# Patient Record
Sex: FEMALE | Race: WHITE | NOT HISPANIC OR LATINO | Employment: FULL TIME | URBAN - METROPOLITAN AREA
[De-identification: names, ages, dates, MRNs, and addresses within clinical notes are randomized per-mention and may not be internally consistent; named-entity substitution may affect disease eponyms.]

---

## 2018-06-27 RX ORDER — HYDROCODONE BITARTRATE AND ACETAMINOPHEN 10; 325 MG/1; MG/1
1 TABLET ORAL EVERY 6 HOURS PRN
Refills: 0 | COMMUNITY
Start: 2018-05-10 | End: 2018-07-03

## 2018-06-27 RX ORDER — METHIMAZOLE 5 MG/1
5 TABLET ORAL DAILY
Refills: 6 | COMMUNITY
Start: 2018-05-27 | End: 2018-07-03 | Stop reason: SDUPTHER

## 2018-06-27 RX ORDER — ZOLPIDEM TARTRATE 10 MG/1
10 TABLET ORAL EVERY EVENING
Refills: 1 | COMMUNITY
Start: 2018-05-30 | End: 2018-11-01 | Stop reason: SDUPTHER

## 2018-06-28 RX ORDER — FLUCONAZOLE 150 MG/1
150 TABLET ORAL AS NEEDED
COMMUNITY
Start: 2018-04-02 | End: 2019-11-04

## 2018-06-28 RX ORDER — ALBUTEROL SULFATE 90 UG/1
2 AEROSOL, METERED RESPIRATORY (INHALATION)
COMMUNITY
Start: 2018-04-02 | End: 2018-07-03

## 2018-07-03 ENCOUNTER — LAB (OUTPATIENT)
Dept: LAB | Facility: CLINIC | Age: 38
End: 2018-07-03
Payer: COMMERCIAL

## 2018-07-03 ENCOUNTER — TRANSCRIBE ORDERS (OUTPATIENT)
Dept: ADMINISTRATIVE | Facility: HOSPITAL | Age: 38
End: 2018-07-03

## 2018-07-03 ENCOUNTER — OFFICE VISIT (OUTPATIENT)
Dept: FAMILY MEDICINE CLINIC | Facility: CLINIC | Age: 38
End: 2018-07-03
Payer: COMMERCIAL

## 2018-07-03 VITALS
WEIGHT: 176 LBS | OXYGEN SATURATION: 98 % | HEART RATE: 91 BPM | SYSTOLIC BLOOD PRESSURE: 112 MMHG | BODY MASS INDEX: 25.2 KG/M2 | DIASTOLIC BLOOD PRESSURE: 64 MMHG | HEIGHT: 70 IN

## 2018-07-03 DIAGNOSIS — E05.00 GRAVES' DISEASE: ICD-10-CM

## 2018-07-03 DIAGNOSIS — Z11.4 SCREENING FOR HIV (HUMAN IMMUNODEFICIENCY VIRUS): ICD-10-CM

## 2018-07-03 DIAGNOSIS — R25.2 MUSCLE CRAMP: ICD-10-CM

## 2018-07-03 DIAGNOSIS — M79.672 FOOT PAIN, LEFT: ICD-10-CM

## 2018-07-03 DIAGNOSIS — N64.3 GALACTORRHEA: ICD-10-CM

## 2018-07-03 DIAGNOSIS — D50.0 IRON DEFICIENCY ANEMIA DUE TO CHRONIC BLOOD LOSS: Primary | ICD-10-CM

## 2018-07-03 DIAGNOSIS — G47.09 OTHER INSOMNIA: ICD-10-CM

## 2018-07-03 PROBLEM — R68.82 DECREASED LIBIDO: Status: ACTIVE | Noted: 2018-07-03

## 2018-07-03 LAB
ANION GAP SERPL CALCULATED.3IONS-SCNC: 4 MMOL/L (ref 4–13)
BUN SERPL-MCNC: 11 MG/DL (ref 5–25)
CALCIUM SERPL-MCNC: 8.9 MG/DL (ref 8.3–10.1)
CHLORIDE SERPL-SCNC: 108 MMOL/L (ref 100–108)
CO2 SERPL-SCNC: 25 MMOL/L (ref 21–32)
CREAT SERPL-MCNC: 0.84 MG/DL (ref 0.6–1.3)
GFR SERPL CREATININE-BSD FRML MDRD: 88 ML/MIN/1.73SQ M
GLUCOSE SERPL-MCNC: 83 MG/DL (ref 65–140)
MAGNESIUM SERPL-MCNC: 2.4 MG/DL (ref 1.6–2.6)
POTASSIUM SERPL-SCNC: 4.7 MMOL/L (ref 3.5–5.3)
PROLACTIN SERPL-MCNC: 8.4 NG/ML
SODIUM SERPL-SCNC: 137 MMOL/L (ref 136–145)
TSH SERPL DL<=0.05 MIU/L-ACNC: 0.72 UIU/ML (ref 0.36–3.74)

## 2018-07-03 PROCEDURE — 80048 BASIC METABOLIC PNL TOTAL CA: CPT

## 2018-07-03 PROCEDURE — 83735 ASSAY OF MAGNESIUM: CPT

## 2018-07-03 PROCEDURE — 99204 OFFICE O/P NEW MOD 45 MIN: CPT | Performed by: INTERNAL MEDICINE

## 2018-07-03 PROCEDURE — 1036F TOBACCO NON-USER: CPT | Performed by: INTERNAL MEDICINE

## 2018-07-03 PROCEDURE — 87389 HIV-1 AG W/HIV-1&-2 AB AG IA: CPT

## 2018-07-03 PROCEDURE — 84146 ASSAY OF PROLACTIN: CPT

## 2018-07-03 PROCEDURE — 36415 COLL VENOUS BLD VENIPUNCTURE: CPT

## 2018-07-03 PROCEDURE — 84443 ASSAY THYROID STIM HORMONE: CPT

## 2018-07-03 RX ORDER — METHIMAZOLE 5 MG/1
5 TABLET ORAL DAILY
Qty: 90 TABLET | Refills: 1 | Status: SHIPPED | OUTPATIENT
Start: 2018-07-03 | End: 2019-01-21 | Stop reason: SDUPTHER

## 2018-07-03 NOTE — ASSESSMENT & PLAN NOTE
I think many of her symptoms are likely related to hyperthyroidism  Since she has been on the methimazole 5 mg daily for about a month, will recheck TSH and free T4  We did discuss that she may need to consider I 131 ablation or thyroidectomy in the long run

## 2018-07-03 NOTE — PROGRESS NOTES
Assessment/Plan:    Graves' disease    I think many of her symptoms are likely related to hyperthyroidism  Since she has been on the methimazole 5 mg daily for about a month, will recheck TSH and free T4  We did discuss that she may need to consider I 131 ablation or thyroidectomy in the long run  Kidney stones    Although she has not had a stone workup, she has been able to avoid kidney stones staying hydrated  We also talked about the possibility of decreasing protein intake which we will not pursue she has more kidney stones  Iron deficiency anemia due to chronic blood loss    This is likely related to menstrual losses  Will start on iron sulfate 324 mg every other day and we will likely repeat a CBC and ferritin in about 6 weeks  Other insomnia    This is likely related to stressors  It has been ongoing for about 10 years  Since she has been on an Ambien for quite some time, I think that the likelihood that she will be able to sleep without this medication is remote  We did discuss the possibility of taking nortriptyline but she had been on this in the past for the left foot pain  For now, we will continue Ambien  Foot pain, left    I wary of using hydrocodone and other opiates as is she   Given that she has already failed try cyclic antidepressants and gabapentin, I am not confident that adding more medication at this time would be very helpful  In any event, she is wary of taking more pills  Muscle cramp    Seems she is having cramping in her left foot in addition to the chronic pain  Will check a basic metabolic panel  and magnesium levels  Galactorrhea   She did have a pituitary MRI in the past which did not reveal a pituitary microadenoma  Will check prolactin level  Decreased libido  Could be related to elevated prolactin  There are some stressors that may also be a contributor  Will check prolactin      Orders Placed This Encounter   Procedures    Rapid HIV 1/2 AB-AG Combo     Standing Status:   Future     Number of Occurrences:   1     Standing Expiration Date:   7/3/2019    Prolactin     Standing Status:   Future     Number of Occurrences:   1     Standing Expiration Date:   7/3/2019    TSH, 3rd generation with Free T4 reflex     Standing Status:   Future     Number of Occurrences:   1     Standing Expiration Date:   7/3/2019    Basic metabolic panel     This is a patient instruction: Patient fasting for 8 hours or longer recommended  Standing Status:   Future     Number of Occurrences:   1     Standing Expiration Date:   7/3/2019    Magnesium     Standing Status:   Future     Number of Occurrences:   1     Standing Expiration Date:   7/3/2019        Chief complaint: I am tired and I do not feel well  HPI:    Kartik Alejandre is a 44 y o  female She feels tired, doesn't sleep well, has diarrhea, loss of libido  She was diagnosed with Graves disease on the basis of a suppressed TSH and thyroid stimulating immunoglobulins  She was started on actually but she did tolerate this well  She was switched to methimazole 2 5 mg daily  A TSH on the 5th of June was still suppressed and her but that is all was increased to 5 mg daily  She reports that she has discussed the possibility of I 131 ablation and thyroidectomy with her endocrinologist in [place]  Has iron deficiency anemia with a ferritin of 10  She doesn't chew ice but likes her drinks very cold and likes salty foods  Isn't on any iron supplements currently  She doesn't have heavy menstrual periods; first two days are heavy, the remaining 3 days are light  She has chronic pain in her left foot related to an injury several years ago  Treatment of the injury actually required fracturing and fixing the fracture  Since then, she has had persistent pain  She has tried nortriptyline and gabapentin which were not effective    She had taken hydrocodone in the past but she did not feel comfortable taking his medication because of potential for addiction  Past Medical History:   Diagnosis Date    Anemia     Disease of thyroid gland     hyperthyroid    Foot pain, left 2013    She fractured a bone in her left foot  Had severe pain associated with redness  Was given the diagnosis of reflex sympathetic dystrophy  Has had nerve blocks which were ineffective   Galactorrhea in female 11/10/2015    Genital warts     Graves' disease 7/3/2018    TSI positive  Intolerant of TPU  Switched to methimazole 5 mg daily by endocrinologist   TSH in June of 2018 0 005 with normal T3 & T4  Followed by endocrinology at [facility] Katelyn Estrada   Hepatitis B 1998    Diagnosed with acute Hepatitis B  Subsequent Hep B Surface Ag negative, Surface Ab positive  HCV negative, HIV negative 2010   Hypertrophic scar 07/12/2012    Iron deficiency anemia due to chronic blood loss 7/3/2018     Hemoglobin 11 4 in June of 2018 with a ferritin of 10  Presumed secondary to menstrual losses   Kidney stone     Has passed several kidney stones  No prior kidney stone workup  Try to stay well hydrated  No kidney stones for several years      JEFF III (vulvar intraepithelial neoplasia III) 01/2015        Past Surgical History:   Procedure Laterality Date    APPENDECTOMY  2001    BREAST IMPLANT Bilateral 2008    CHOLECYSTECTOMY  2004    FOOT SURGERY  2013    REVISION OF SCAR  02/18/2012    appendectomy scar revised        Family History   Problem Relation Age of Onset    Breast cancer Mother     Thyroid disease Mother     Iron deficiency Mother     Heart disease Mother     Thyroid disease Sister     Thyroid disease Maternal Grandmother     Diabetes Maternal Grandmother     Heart disease Maternal Grandfather     Prostate cancer Maternal Grandfather     Breast cancer Paternal Grandmother     Cervical cancer Paternal Grandmother     Heart disease Paternal Grandmother     Heart disease Paternal Janey Batesble Hypertension Paternal Grandfather     Cancer Paternal Grandfather     Carpal tunnel syndrome Father     Heart disease Maternal Aunt         Social History     Social History    Marital status: /Civil Union     Spouse name: N/A    Number of children: N/A    Years of education: N/A     Occupational History    Not on file  Social History Main Topics    Smoking status: Never Smoker    Smokeless tobacco: Never Used    Alcohol use No    Drug use: No    Sexual activity: Yes     Partners: Male     Birth control/ protection: Male Sterilization     Other Topics Concern    Not on file     Social History Narrative    Works as a  for [employer]  Current Outpatient Prescriptions on File Prior to Visit   Medication Sig Dispense Refill    zolpidem (AMBIEN) 10 mg tablet Take 10 mg by mouth every evening  1    [DISCONTINUED] HYDROcodone-acetaminophen (NORCO)  mg per tablet Take 1 tablet by mouth every 6 (six) hours as needed  0    [DISCONTINUED] methimazole (TAPAZOLE) 5 mg tablet Take 5 mg by mouth daily    6    [DISCONTINUED] VENTOLIN  (90 Base) MCG/ACT inhaler INHALE 2 PUFFS INTO THE LUNGS EVERY 6 (SIX) HOURS AS NEEDED FOR WHEEZING  2     No current facility-administered medications on file prior to visit  Allergies   Allergen Reactions    Sulfa Antibiotics      Causes urinary symptoms    Aspirin Rash     GI upset    Penicillins Rash     GI upset       Review of Systems   Constitutional: Positive for fatigue  Negative for diaphoresis, fever and unexpected weight change  HENT: Positive for sore throat, trouble swallowing and voice change  Negative for postnasal drip and rhinorrhea  Eyes: Positive for visual disturbance ( has noticed a gradual decrease in visual acuity)  Respiratory: Negative for cough, shortness of breath and wheezing  Cardiovascular: Negative for leg swelling   Chest pain:  can run a mile but notices chest burning which resolves after a few minutes after she stops  Gastrointestinal: Positive for diarrhea ( only one loose bowel movement a day  )  Negative for abdominal pain and blood in stool  Endocrine: Negative for heat intolerance, polydipsia and polyuria  Genitourinary: Negative for dysuria and menstrual problem  Musculoskeletal:        Per HPI  Neurological: Negative for seizures, syncope, weakness and numbness  Hematological: Negative for adenopathy  Psychiatric/Behavioral:        Used to see a counselor  No history of depression  /64 (BP Location: Left arm, Patient Position: Sitting, Cuff Size: Standard)   Pulse 91   Ht 5' 10" (1 778 m)   Wt 79 8 kg (176 lb)   LMP 06/06/2018   SpO2 98%   Breastfeeding? No   BMI 25 25 kg/m²     General:  Well-developed, well-nourished, in no acute distress  Skin:  Warm, moist, no significant lesions  Well-healed surgical scar on her left lateral foot  No skin changes to suggest RSD  HENT:  Normocephalic, atraumatic, tympanic membranes clear bilaterally, ear canals unremarkable bilaterally, nasal mucosa without lesions, oropharynx was clear without exudate  Eyes: PERRL, EOMI, conjunctivae normal, fundi revealed sharp discs bilaterally without hemorrhages or exudates  Neck:  No thyromegaly, thyroid nodules or cervical lymphadenopathy  Cardiac:  Regular rate and rhythm, no murmur, gallop, or rub  There is no JVD or HJR  Lungs:  Clear to auscultation and percussion  Breasts:   Deferred to gyn  Abdomen:  Soft, nontender, normoactive bowel sounds, no palpable masses, no hepatosplenomegaly  :  Deferred to gyn  Musculoskeletal:  No clubbing, cyanosis, or edema  Neurologic:  Cranial nerves 2-12 intact, motor was 5/5 in all major groups, DTRs 2+ throughout  No tremor  cerebellar was intact a finger to nose  Psychiatric:  Mood bright, appropriate affect and insight

## 2018-07-03 NOTE — ASSESSMENT & PLAN NOTE
Could be related to elevated prolactin  There are some stressors that may also be a contributor  Will check prolactin

## 2018-07-03 NOTE — ASSESSMENT & PLAN NOTE
This is likely related to stressors  It has been ongoing for about 10 years  Since she has been on an Ambien for quite some time, I think that the likelihood that she will be able to sleep without this medication is remote  We did discuss the possibility of taking nortriptyline but she had been on this in the past for the left foot pain  For now, we will continue Ambien

## 2018-07-03 NOTE — PATIENT INSTRUCTIONS
For the Graves disease we will check thyroid test today  Will also check a metabolic panel and magnesium levels because of the cramps  Since you have iron deficiency anemia, would recommend taking ferrous sulfate 324 mg every other day  We will recheck a blood count   and iron level is several weeks  for now, we have elected not to start any new medications for pain  Continue aggressive hydration to prevent kidney stones

## 2018-07-03 NOTE — ASSESSMENT & PLAN NOTE
She did have a pituitary MRI in the past which did not reveal a pituitary microadenoma  Will check prolactin level

## 2018-07-03 NOTE — ASSESSMENT & PLAN NOTE
I wary of using hydrocodone and other opiates as is she   Given that she has already failed try cyclic antidepressants and gabapentin, I am not confident that adding more medication at this time would be very helpful  In any event, she is wary of taking more pills

## 2018-07-03 NOTE — ASSESSMENT & PLAN NOTE
This is likely related to menstrual losses  Will start on iron sulfate 324 mg every other day and we will likely repeat a CBC and ferritin in about 6 weeks

## 2018-07-03 NOTE — ASSESSMENT & PLAN NOTE
Although she has not had a stone workup, she has been able to avoid kidney stones staying hydrated  We also talked about the possibility of decreasing protein intake which we will not pursue she has more kidney stones

## 2018-07-03 NOTE — ASSESSMENT & PLAN NOTE
Seems she is having cramping in her left foot in addition to the chronic pain  Will check a basic metabolic panel  and magnesium levels

## 2018-07-06 LAB — HIV 1+2 AB+HIV1 P24 AG SERPL QL IA: NORMAL

## 2018-08-09 DIAGNOSIS — D50.0 IRON DEFICIENCY ANEMIA DUE TO CHRONIC BLOOD LOSS: Primary | ICD-10-CM

## 2018-08-09 NOTE — Clinical Note
Please fax the orders for the CBC and ferritin to the [facility] laboratory in [place]

## 2018-08-13 ENCOUNTER — OFFICE VISIT (OUTPATIENT)
Dept: FAMILY MEDICINE CLINIC | Facility: CLINIC | Age: 38
End: 2018-08-13
Payer: COMMERCIAL

## 2018-08-13 VITALS
DIASTOLIC BLOOD PRESSURE: 64 MMHG | SYSTOLIC BLOOD PRESSURE: 114 MMHG | WEIGHT: 173.8 LBS | HEIGHT: 70 IN | BODY MASS INDEX: 24.88 KG/M2

## 2018-08-13 DIAGNOSIS — E05.00 GRAVES' DISEASE: Primary | ICD-10-CM

## 2018-08-13 PROCEDURE — 99213 OFFICE O/P EST LOW 20 MIN: CPT | Performed by: INTERNAL MEDICINE

## 2018-08-13 PROCEDURE — 3008F BODY MASS INDEX DOCD: CPT | Performed by: INTERNAL MEDICINE

## 2018-08-13 RX ORDER — ASCORBIC ACID 250 MG
500 TABLET,CHEWABLE ORAL DAILY
COMMUNITY
End: 2020-11-23

## 2018-08-13 RX ORDER — FERROUS SULFATE TAB EC 324 MG (65 MG FE EQUIVALENT) 324 (65 FE) MG
324 TABLET DELAYED RESPONSE ORAL EVERY OTHER DAY
COMMUNITY
End: 2020-11-23

## 2018-08-13 NOTE — ASSESSMENT & PLAN NOTE
Recent TSH is now normal on methimazole 5 mg daily  Continue current regimen and follow-up with Endocrinology

## 2018-08-13 NOTE — PROGRESS NOTES
Assessment/Plan:    Graves' disease  Recent TSH is now normal on methimazole 5 mg daily  Continue current regimen and follow-up with Endocrinology  Iron deficiency anemia due to chronic blood loss    CBC over the weekend had normal hemoglobin  Chronic low white blood cell count noted  Ferritin is still low at 9  Would continue ferrous sulfate every other day and vitamin-C 500 mg daily and will repeat a CBC and ferritin in about 3 months    Muscle cramp  Electrolytes and magnesium normal   Try tonic water at bedtime  Galactorrhea  Prolactin is normal     Weight management  I shared with Pratik Bonita that she is at a top normal body mass index  I would  Recommend she try to achieve a caloric deficit of 300 calories which would be about 1500 calories total per day  Recommended she try to get about 170 g of protein per day and limit her fat intake  Subjective:     Patient ID: Chris Ferguson is a 43 y o  female  Who returns for follow-up for lab results  Her endocrinologist ordered a TSH last week and her TSH is now normal   She is taking iron sulfate 324 mg every other day and chewable vitamin-C 500 mg every day  She reports that her libido hasn't improved though she shared that this may have more to do with her relationship with her spouse and other stressors  She is able to orgasm on her own  She has regular menstrual periods  She would like to lose weight  She does a total body workout at a gym twice a week  She isn't counting calories currently  Objective:    /64 (BP Location: Left arm, Patient Position: Sitting, Cuff Size: Standard)   Ht 5' 10" (1 778 m)   Wt 78 8 kg (173 lb 12 8 oz)   BMI 24 94 kg/m²       Physical Exam     General:  Well-developed, well-nourished, in no acute distress  Exam deferred today

## 2018-08-13 NOTE — LETTER
August 13, 2018     Patient: Yen Em   YOB: 1980   Date of Visit: 8/13/2018       To Whom it May Concern:    Yen Em is under my professional care  She was seen in my office on 8/13/2018  She may return to work on August 13, 2018  If you have any questions or concerns, please don't hesitate to call           Sincerely,          Hazel Gonzalez MD        CC: No Recipients

## 2018-08-13 NOTE — PATIENT INSTRUCTIONS
Graves' disease  Recent TSH is now normal on methimazole 5 mg daily  Continue current regimen and follow-up with Endocrinology  Iron deficiency anemia due to chronic blood loss    CBC over the weekend had normal hemoglobin  Chronic low white blood cell count noted  Ferritin is still low at 9  Would continue ferrous sulfate every other day and vitamin-C 500 mg daily and will repeat a CBC and ferritin in about 3 months    Muscle cramp  Electrolytes and magnesium normal   Try tonic water at bedtime      Galactorrhea  Prolactin is normal

## 2018-08-13 NOTE — ASSESSMENT & PLAN NOTE
CBC over the weekend had normal hemoglobin  Chronic low white blood cell count noted  Ferritin is still low at 9   Would continue ferrous sulfate every other day and vitamin-C 500 mg daily and will repeat a CBC and ferritin in about 3 months

## 2018-08-13 NOTE — ASSESSMENT & PLAN NOTE
We discussed that libidinal challenges may be related to emotional issues and stressors and not an endocrinologic issue

## 2018-08-21 ENCOUNTER — DOCUMENTATION (OUTPATIENT)
Dept: PERINATAL CARE | Facility: CLINIC | Age: 38
End: 2018-08-21

## 2018-08-21 NOTE — PROGRESS NOTES
Contacted pt with sequential screen part 1 results-positive ds risk  Pt contacted ob to discuss results with them-pt scheduled for genetic counseling Friday with Too Diana

## 2018-10-31 ENCOUNTER — PATIENT MESSAGE (OUTPATIENT)
Dept: FAMILY MEDICINE CLINIC | Facility: CLINIC | Age: 38
End: 2018-10-31

## 2018-10-31 DIAGNOSIS — G47.09 OTHER INSOMNIA: Primary | ICD-10-CM

## 2018-11-01 RX ORDER — ZOLPIDEM TARTRATE 10 MG/1
10 TABLET ORAL EVERY EVENING
Qty: 30 TABLET | Refills: 1 | Status: SHIPPED | OUTPATIENT
Start: 2018-11-01 | End: 2018-12-31 | Stop reason: SDUPTHER

## 2018-12-31 DIAGNOSIS — G47.09 OTHER INSOMNIA: ICD-10-CM

## 2018-12-31 RX ORDER — ZOLPIDEM TARTRATE 10 MG/1
10 TABLET ORAL EVERY EVENING
Qty: 30 TABLET | Refills: 0 | Status: SHIPPED | OUTPATIENT
Start: 2018-12-31 | End: 2019-01-31 | Stop reason: SDUPTHER

## 2018-12-31 NOTE — TELEPHONE ENCOUNTER
She was here today with her daughter and requested refill of Ambien  Sent the Ambien in to the [facility] in [place]

## 2019-01-21 DIAGNOSIS — E05.00 GRAVES' DISEASE: ICD-10-CM

## 2019-01-23 RX ORDER — METHIMAZOLE 5 MG/1
5 TABLET ORAL DAILY
Qty: 90 TABLET | Refills: 1 | Status: SHIPPED | OUTPATIENT
Start: 2019-01-23 | End: 2019-07-27 | Stop reason: SDUPTHER

## 2019-01-30 RX ORDER — ALBUTEROL SULFATE 90 UG/1
2 AEROSOL, METERED RESPIRATORY (INHALATION) AS NEEDED
COMMUNITY
Start: 2018-04-02 | End: 2019-04-02

## 2019-01-31 ENCOUNTER — OFFICE VISIT (OUTPATIENT)
Dept: FAMILY MEDICINE CLINIC | Facility: CLINIC | Age: 39
End: 2019-01-31
Payer: COMMERCIAL

## 2019-01-31 VITALS
HEART RATE: 58 BPM | HEIGHT: 70 IN | SYSTOLIC BLOOD PRESSURE: 114 MMHG | WEIGHT: 175.8 LBS | BODY MASS INDEX: 25.17 KG/M2 | OXYGEN SATURATION: 97 % | DIASTOLIC BLOOD PRESSURE: 60 MMHG

## 2019-01-31 DIAGNOSIS — N63.20 LEFT BREAST MASS: ICD-10-CM

## 2019-01-31 DIAGNOSIS — Z23 NEEDS FLU SHOT: Primary | ICD-10-CM

## 2019-01-31 DIAGNOSIS — G47.09 OTHER INSOMNIA: ICD-10-CM

## 2019-01-31 PROCEDURE — 1036F TOBACCO NON-USER: CPT | Performed by: INTERNAL MEDICINE

## 2019-01-31 PROCEDURE — 99214 OFFICE O/P EST MOD 30 MIN: CPT | Performed by: INTERNAL MEDICINE

## 2019-01-31 PROCEDURE — 3008F BODY MASS INDEX DOCD: CPT | Performed by: INTERNAL MEDICINE

## 2019-01-31 RX ORDER — ZOLPIDEM TARTRATE 10 MG/1
10 TABLET ORAL EVERY EVENING
Qty: 30 TABLET | Refills: 0 | Status: SHIPPED | OUTPATIENT
Start: 2019-01-31 | End: 2019-03-30 | Stop reason: SDUPTHER

## 2019-01-31 NOTE — PATIENT INSTRUCTIONS
Left breast mass  Will send for diagnostic mammogram and ultrasound if needed  Other insomnia  Follow sleep hygiene rules  Try to avoid taking Ambien during a week on vacation

## 2019-01-31 NOTE — PROGRESS NOTES
Assessment/Plan:    Problem List Items Addressed This Visit        Other    Other insomnia     Follow sleep hygiene rules  Try to avoid taking Ambien during a week on vacation  Relevant Medications    zolpidem (AMBIEN) 10 mg tablet    Left breast mass     Will send for diagnostic mammogram and ultrasound if needed  Relevant Orders    Mammo diagnostic bilateral w cad      Other Visit Diagnoses     Needs flu shot    -  Primary    Relevant Orders    MULTI-DOSE VIAL: influenza vaccine, [id number], quadrivalent, 0 5 mL (AFLURIA, FLULAVAL, FLUZONE)        Right thenar discomfort - I do not think this is carpal tunnel syndrome  Recommend that she get a rigid wrist brace and try wearing that at night as much as possible  Subjective:     Patient ID: Adrianna Velasquez is a 43 y o  female  Presents for evaluation of left breast lump  She noticed a tender lump at about the 7 o'clock position on the left breast   She is concerned because her mother has a history of breast cancer  She has not noticed any adenopathy anywhere else  She continues to have difficulty with insomnia  She finds that she cannot sleep if she does not take Ambien at bedtime  She does admit to significant psychological stressors  In addition, she complains of some discomfort in her right medial hand  She has been wearing a soft wrist brace which has not really been helping  Objective:    /60 (BP Location: Right arm, Patient Position: Sitting, Cuff Size: Standard)   Pulse 58   Ht 5' 10" (1 778 m)   Wt 79 7 kg (175 lb 12 8 oz)   SpO2 97%   BMI 25 22 kg/m²       Physical Exam    General:  Well-developed, well-nourished, in no acute distress  Cardiac:  Regular rate and rhythm, no murmur, gallop, or rub  There is no JVD or HJR  Physical Exam   Pulmonary/Chest:       Musculoskeletal:   No reproducible tenderness on palpation of the right hypo thenar eminence  Phalen's and Tinel sign were negative on the right    Motor strength was 5/5 in both of her hands

## 2019-01-31 NOTE — LETTER
January 31, 2019     Patient: Adrianna Velasquez   YOB: 1980   Date of Visit: 1/31/2019       To Whom it May Concern:    Adrianna Velasquez is under my professional care  She was seen in my office on 1/31/2019  She may return to work on 02/01/2018  If you have any questions or concerns, please don't hesitate to call           Sincerely,          Hui Caicedo MD        CC: No Recipients

## 2019-02-09 DIAGNOSIS — N63.20 LEFT BREAST MASS: ICD-10-CM

## 2019-02-11 ENCOUNTER — TELEPHONE (OUTPATIENT)
Dept: FAMILY MEDICINE CLINIC | Facility: CLINIC | Age: 39
End: 2019-02-11

## 2019-02-13 ENCOUNTER — TELEPHONE (OUTPATIENT)
Dept: FAMILY MEDICINE CLINIC | Facility: CLINIC | Age: 39
End: 2019-02-13

## 2019-02-13 DIAGNOSIS — N63.20 LEFT BREAST MASS: Primary | ICD-10-CM

## 2019-02-15 ENCOUNTER — TELEPHONE (OUTPATIENT)
Dept: FAMILY MEDICINE CLINIC | Facility: CLINIC | Age: 39
End: 2019-02-15

## 2019-02-15 NOTE — TELEPHONE ENCOUNTER
I called the listed to let her know that her breast biopsy showed inflammation  I suggested that she check in with her surgeon that performed her breast implants (Dr Kandice Schaffer) to make sure that he does not have any concerns about the implants causing a reaction

## 2019-02-15 NOTE — TELEPHONE ENCOUNTER
----- Message from Ania Botello, [address] [place] sent at 2/14/2019  5:22 PM EST -----      ----- Message -----  From: Patti, Transcription Incoming  Sent: 2/14/2019   4:15 PM  To: [facility]

## 2019-03-26 ENCOUNTER — ANNUAL EXAM (OUTPATIENT)
Dept: FAMILY MEDICINE CLINIC | Facility: CLINIC | Age: 39
End: 2019-03-26
Payer: COMMERCIAL

## 2019-03-26 VITALS
OXYGEN SATURATION: 98 % | BODY MASS INDEX: 25.05 KG/M2 | DIASTOLIC BLOOD PRESSURE: 56 MMHG | WEIGHT: 175 LBS | SYSTOLIC BLOOD PRESSURE: 114 MMHG | HEIGHT: 70 IN | HEART RATE: 80 BPM

## 2019-03-26 DIAGNOSIS — Z01.419 ENCOUNTER FOR CERVICAL PAP SMEAR WITH PELVIC EXAM: Primary | ICD-10-CM

## 2019-03-26 DIAGNOSIS — E05.90 HYPERTHYROIDISM: ICD-10-CM

## 2019-03-26 DIAGNOSIS — N89.8 VAGINAL ODOR: ICD-10-CM

## 2019-03-26 DIAGNOSIS — R37 SEXUAL DYSFUNCTION: ICD-10-CM

## 2019-03-26 PROCEDURE — 99214 OFFICE O/P EST MOD 30 MIN: CPT | Performed by: NURSE PRACTITIONER

## 2019-03-26 PROCEDURE — G0145 SCR C/V CYTO,THINLAYER,RESCR: HCPCS | Performed by: NURSE PRACTITIONER

## 2019-03-26 PROCEDURE — S0610 ANNUAL GYNECOLOGICAL EXAMINA: HCPCS | Performed by: NURSE PRACTITIONER

## 2019-03-26 NOTE — PROGRESS NOTES
Subjective      Chen Zaman is a 43 y o  female who presents for a gyn exam and to discuss sexual dysfunction issues  Reports since she began with hyperthyroidism, she has had issues with her sex drive  Notes she has not felt right since then - unsure if it related to the thyroid, or hormones  She is currently on Methimazole 5 mg  Last TSH in 2018 was 4 51  Has not had her estrogen or testosterone checked  Notes of a past hx of galactorrhea with sexual stimulation  Had two MRI's completed and notes one showed a pineal cyst and was told the second did not indicate one  Reports the galactorrhea has resolved  Also reports weight gain, is unable to lose weight despite being very active  Periods are regular every 28-30 days, lasting several days  Dysmenorrhea:none  Cyclic symptoms include none  No intermenstrual bleeding, spotting, or discharge  Current contraception: none  History of abnormal Pap smear: no  Family history of uterine or ovarian cancer: no  Regular self breast exam: unsure  History of abnormal mammogram: yes - see care everywhere - hx of recent mass which was not cancerours  Family history of breast cancer: yes - mother  Menstrual History:  OB History        2    Para   2    Term   2       0    AB   0    Living   2       SAB   0    TAB   0    Ectopic   0    Multiple   0    Live Births   2                  Last LMP 3 weeks ago  The following portions of the patient's history were reviewed and updated as appropriate: allergies, current medications, past family history, past medical history, past social history, past surgical history and problem list     Review of Systems  Pertinent items are noted in HPI        Objective      /56 (BP Location: Right arm, Patient Position: Sitting, Cuff Size: Standard)   Pulse 80   Ht 5' 10" (1 778 m)   Wt 79 4 kg (175 lb)   SpO2 98%   BMI 25 11 kg/m²     General:   alert and oriented, in no acute distress, appears stated age and cooperative           Abdomen: soft, non-tender, without masses or organomegaly   Vulva: normal   Vagina: Minimal lubrication   Cervix: no bleeding following Pap, no cervical motion tenderness and no lesions   Uterus: normal size   Adnexa: normal adnexa             Assessment      Symptomatic uterine fibroids  Plan   Per her request, will stop the Methimazole for 1 month and reassess her TSH  Will have her TSH checked prior to stopping  Will also check her Estrogen and Testosterone level one week after her menses starts  All questions answered  Await pap smear results

## 2019-03-28 DIAGNOSIS — E34.8 PINEAL GLAND CYST: Primary | ICD-10-CM

## 2019-03-28 DIAGNOSIS — R37 SEXUAL DYSFUNCTION: ICD-10-CM

## 2019-03-28 DIAGNOSIS — E34.9 HORMONE IMBALANCE: ICD-10-CM

## 2019-03-28 LAB
LAB AP GYN PRIMARY INTERPRETATION: NORMAL
Lab: NORMAL

## 2019-03-30 DIAGNOSIS — G47.09 OTHER INSOMNIA: ICD-10-CM

## 2019-04-01 RX ORDER — ZOLPIDEM TARTRATE 10 MG/1
10 TABLET ORAL EVERY EVENING
Qty: 30 TABLET | Refills: 0 | Status: SHIPPED | OUTPATIENT
Start: 2019-04-01 | End: 2019-05-01 | Stop reason: SDUPTHER

## 2019-04-09 ENCOUNTER — TELEPHONE (OUTPATIENT)
Dept: FAMILY MEDICINE CLINIC | Facility: CLINIC | Age: 39
End: 2019-04-09

## 2019-04-10 ENCOUNTER — TELEPHONE (OUTPATIENT)
Dept: FAMILY MEDICINE CLINIC | Facility: CLINIC | Age: 39
End: 2019-04-10

## 2019-04-11 DIAGNOSIS — R79.89 LOW TESTOSTERONE LEVEL IN FEMALE: ICD-10-CM

## 2019-04-11 DIAGNOSIS — N64.3 GALACTORRHEA IN FEMALE: ICD-10-CM

## 2019-04-11 DIAGNOSIS — E05.90 HYPERTHYROIDISM: ICD-10-CM

## 2019-04-11 DIAGNOSIS — E34.8 PINEAL GLAND CYST: Primary | ICD-10-CM

## 2019-04-15 ENCOUNTER — TELEPHONE (OUTPATIENT)
Dept: FAMILY MEDICINE CLINIC | Facility: CLINIC | Age: 39
End: 2019-04-15

## 2019-05-01 ENCOUNTER — OFFICE VISIT (OUTPATIENT)
Dept: FAMILY MEDICINE CLINIC | Facility: CLINIC | Age: 39
End: 2019-05-01
Payer: COMMERCIAL

## 2019-05-01 VITALS
HEART RATE: 69 BPM | BODY MASS INDEX: 25.8 KG/M2 | HEIGHT: 70 IN | OXYGEN SATURATION: 97 % | WEIGHT: 180.2 LBS | SYSTOLIC BLOOD PRESSURE: 118 MMHG | DIASTOLIC BLOOD PRESSURE: 76 MMHG

## 2019-05-01 DIAGNOSIS — G47.09 OTHER INSOMNIA: ICD-10-CM

## 2019-05-01 DIAGNOSIS — F51.01 PRIMARY INSOMNIA: ICD-10-CM

## 2019-05-01 DIAGNOSIS — E05.90 HYPERTHYROIDISM: Primary | ICD-10-CM

## 2019-05-01 DIAGNOSIS — B37.9 YEAST INFECTION: ICD-10-CM

## 2019-05-01 DIAGNOSIS — M79.675 PAIN OF TOE OF LEFT FOOT: ICD-10-CM

## 2019-05-01 DIAGNOSIS — R49.0 RASPY VOICE: ICD-10-CM

## 2019-05-01 DIAGNOSIS — N64.3 GALACTORRHEA IN FEMALE: ICD-10-CM

## 2019-05-01 DIAGNOSIS — E05.00 GRAVES' DISEASE: ICD-10-CM

## 2019-05-01 DIAGNOSIS — R68.82 DECREASED LIBIDO: ICD-10-CM

## 2019-05-01 PROCEDURE — 1036F TOBACCO NON-USER: CPT | Performed by: NURSE PRACTITIONER

## 2019-05-01 PROCEDURE — 99213 OFFICE O/P EST LOW 20 MIN: CPT | Performed by: NURSE PRACTITIONER

## 2019-05-01 PROCEDURE — 3008F BODY MASS INDEX DOCD: CPT | Performed by: NURSE PRACTITIONER

## 2019-05-01 RX ORDER — FLUCONAZOLE 150 MG/1
150 TABLET ORAL ONCE
Qty: 1 TABLET | Refills: 3 | Status: SHIPPED | OUTPATIENT
Start: 2019-05-01 | End: 2019-05-01

## 2019-05-01 RX ORDER — ZOLPIDEM TARTRATE 10 MG/1
10 TABLET ORAL EVERY EVENING
Qty: 30 TABLET | Refills: 1 | Status: SHIPPED | OUTPATIENT
Start: 2019-05-01 | End: 2019-06-01 | Stop reason: SDUPTHER

## 2019-05-08 ENCOUNTER — TELEPHONE (OUTPATIENT)
Dept: FAMILY MEDICINE CLINIC | Facility: CLINIC | Age: 39
End: 2019-05-08

## 2019-05-08 DIAGNOSIS — R92.8 ABNORMAL MAMMOGRAM: Primary | ICD-10-CM

## 2019-05-14 DIAGNOSIS — B96.89 BACTERIAL VAGINITIS: Primary | ICD-10-CM

## 2019-05-14 DIAGNOSIS — N76.0 BACTERIAL VAGINITIS: Primary | ICD-10-CM

## 2019-05-14 RX ORDER — METRONIDAZOLE 500 MG/1
500 TABLET ORAL EVERY 12 HOURS SCHEDULED
Qty: 14 TABLET | Refills: 0 | Status: SHIPPED | OUTPATIENT
Start: 2019-05-14 | End: 2019-05-21

## 2019-06-01 DIAGNOSIS — G47.09 OTHER INSOMNIA: ICD-10-CM

## 2019-06-03 RX ORDER — ZOLPIDEM TARTRATE 10 MG/1
10 TABLET ORAL EVERY EVENING
Qty: 30 TABLET | Refills: 0 | Status: SHIPPED | OUTPATIENT
Start: 2019-06-03 | End: 2019-07-01 | Stop reason: SDUPTHER

## 2019-06-06 ENCOUNTER — TELEPHONE (OUTPATIENT)
Dept: FAMILY MEDICINE CLINIC | Facility: CLINIC | Age: 39
End: 2019-06-06

## 2019-06-06 DIAGNOSIS — J06.9 UPPER RESPIRATORY TRACT INFECTION, UNSPECIFIED TYPE: ICD-10-CM

## 2019-06-06 DIAGNOSIS — R09.81 NASAL CONGESTION: Primary | ICD-10-CM

## 2019-06-06 RX ORDER — AZITHROMYCIN 250 MG/1
TABLET, FILM COATED ORAL
Qty: 6 TABLET | Refills: 0 | Status: SHIPPED | OUTPATIENT
Start: 2019-06-06 | End: 2019-06-10

## 2019-07-01 DIAGNOSIS — G47.09 OTHER INSOMNIA: ICD-10-CM

## 2019-07-02 RX ORDER — ZOLPIDEM TARTRATE 10 MG/1
10 TABLET ORAL EVERY EVENING
Qty: 30 TABLET | Refills: 2 | Status: SHIPPED | OUTPATIENT
Start: 2019-07-02 | End: 2019-07-30 | Stop reason: SDUPTHER

## 2019-07-02 RX ORDER — ZOLPIDEM TARTRATE 10 MG/1
10 TABLET ORAL EVERY EVENING
Qty: 30 TABLET | Refills: 0 | OUTPATIENT
Start: 2019-07-02

## 2019-07-02 NOTE — TELEPHONE ENCOUNTER
Kristen,     Will you refuse this one?   I couldn't delete it since it was sent separate from the pharmacy    Thank you

## 2019-07-16 ENCOUNTER — OFFICE VISIT (OUTPATIENT)
Dept: FAMILY MEDICINE CLINIC | Facility: CLINIC | Age: 39
End: 2019-07-16
Payer: COMMERCIAL

## 2019-07-16 VITALS
DIASTOLIC BLOOD PRESSURE: 74 MMHG | WEIGHT: 178.4 LBS | OXYGEN SATURATION: 98 % | BODY MASS INDEX: 25.54 KG/M2 | TEMPERATURE: 98.3 F | HEIGHT: 70 IN | HEART RATE: 78 BPM | SYSTOLIC BLOOD PRESSURE: 118 MMHG

## 2019-07-16 DIAGNOSIS — R07.0 THROAT PAIN: Primary | ICD-10-CM

## 2019-07-16 DIAGNOSIS — R53.83 FATIGUE, UNSPECIFIED TYPE: ICD-10-CM

## 2019-07-16 DIAGNOSIS — R05.9 COUGH: ICD-10-CM

## 2019-07-16 DIAGNOSIS — J34.89 SINUS PRESSURE: ICD-10-CM

## 2019-07-16 DIAGNOSIS — R49.0 VOICE HOARSENESS: ICD-10-CM

## 2019-07-16 LAB — S PYO AG THROAT QL: NEGATIVE

## 2019-07-16 PROCEDURE — 99213 OFFICE O/P EST LOW 20 MIN: CPT | Performed by: NURSE PRACTITIONER

## 2019-07-16 PROCEDURE — 87880 STREP A ASSAY W/OPTIC: CPT | Performed by: NURSE PRACTITIONER

## 2019-07-16 PROCEDURE — 3008F BODY MASS INDEX DOCD: CPT | Performed by: NURSE PRACTITIONER

## 2019-07-16 RX ORDER — PREDNISONE 1 MG/1
10 TABLET ORAL DAILY
Qty: 10 TABLET | Refills: 0 | Status: SHIPPED | OUTPATIENT
Start: 2019-07-16 | End: 2019-07-18

## 2019-07-16 RX ORDER — CLINDAMYCIN HYDROCHLORIDE 150 MG/1
150 CAPSULE ORAL EVERY 8 HOURS SCHEDULED
Qty: 21 CAPSULE | Refills: 0 | Status: SHIPPED | OUTPATIENT
Start: 2019-07-16 | End: 2019-07-23

## 2019-07-16 NOTE — LETTER
July 16, 2019     Patient: Norman Richey   YOB: 1980   Date of Visit: 7/16/2019       To Whom it May Concern:    Norman Richey is under my professional care  She was seen in my office on 7/16/2019  Please excuse her from work on 07/15/2019 and 07/16/2019  She may return to work on 07/20/2019  If you have any questions or concerns, please don't hesitate to call           Sincerely,          BUSHRA Pelayo        CC: No Recipients

## 2019-07-16 NOTE — PROGRESS NOTES
Assessment/Plan:       Diagnoses and all orders for this visit:    Throat pain  -     POCT rapid strepA  -     clindamycin (CLEOCIN) 150 mg capsule; Take 1 capsule (150 mg total) by mouth every 8 (eight) hours for 7 days    Cough  -     clindamycin (CLEOCIN) 150 mg capsule; Take 1 capsule (150 mg total) by mouth every 8 (eight) hours for 7 days    Sinus pressure  -     clindamycin (CLEOCIN) 150 mg capsule; Take 1 capsule (150 mg total) by mouth every 8 (eight) hours for 7 days    Voice hoarseness  -     predniSONE 5 mg tablet; Take 2 tablets (10 mg total) by mouth daily for 5 days    Fatigue, unspecified type  -     clindamycin (CLEOCIN) 150 mg capsule; Take 1 capsule (150 mg total) by mouth every 8 (eight) hours for 7 days  -     predniSONE 5 mg tablet; Take 2 tablets (10 mg total) by mouth daily for 5 days          Subjective:      Patient ID: Norman Richey is a 43 y o  female  Here today for an acute visit  Reports onset of throat pain beginning on Sunday followed by fatigue, throat pain, lost voice, swollen lymph nodes the next day  Overall reports she feels unwell  States strep and mono have both been reports recently at her place of employment  Sore Throat      POCT strep was negative but based on hx of ill co-workers, current physical exam, and sudden onset of sx will treat with abx and steroids  The following portions of the patient's history were reviewed and updated as appropriate: allergies, current medications, past family history, past medical history, past social history, past surgical history and problem list     Review of Systems   Constitutional: Positive for fatigue  HENT: Positive for sore throat and voice change  Respiratory: Negative  Cardiovascular: Negative            Objective:      /74 (BP Location: Right arm, Patient Position: Sitting, Cuff Size: Standard)   Pulse 78   Temp 98 3 °F (36 8 °C)   Ht 5' 10" (1 778 m)   Wt 80 9 kg (178 lb 6 4 oz)   SpO2 98% BMI 25 60 kg/m²          Physical Exam   Constitutional: She is oriented to person, place, and time  She appears ill  HENT:   Mouth/Throat: Uvula is midline  Posterior oropharyngeal erythema present  No tonsillar abscesses  Tonsils are 0 on the right  Tonsils are 0 on the left  No tonsillar exudate  Pulmonary/Chest: Effort normal and breath sounds normal  No respiratory distress  Lymphadenopathy:     She has cervical adenopathy  Neurological: She is alert and oriented to person, place, and time  I have spent 15 minutes with Patient  today in which greater than 50% of this time was spent in counseling/coordination of care regarding Intructions for management and Impressions

## 2019-07-18 ENCOUNTER — TELEPHONE (OUTPATIENT)
Dept: FAMILY MEDICINE CLINIC | Facility: CLINIC | Age: 39
End: 2019-07-18

## 2019-07-18 DIAGNOSIS — R05.9 COUGH: ICD-10-CM

## 2019-07-18 DIAGNOSIS — J98.01 BRONCHOSPASM: Primary | ICD-10-CM

## 2019-07-18 RX ORDER — PREDNISONE 20 MG/1
40 TABLET ORAL DAILY
Qty: 10 TABLET | Refills: 0 | Status: SHIPPED | OUTPATIENT
Start: 2019-07-18 | End: 2019-07-23

## 2019-07-18 RX ORDER — BENZONATATE 100 MG/1
100 CAPSULE ORAL 3 TIMES DAILY PRN
Qty: 20 CAPSULE | Refills: 0 | Status: SHIPPED | OUTPATIENT
Start: 2019-07-18 | End: 2019-08-07

## 2019-07-18 NOTE — TELEPHONE ENCOUNTER
I called Vikki Tellez this afternoon  She has a lot of problems with cough and is worried that she is getting a worsening bronchitis  She was already started on clindamycin  She is using an albuterol inhaler  Although she does not have a history of asthma, she does report that she seems to be sensitive to noxious fumes and does respond to albuterol  She is taking prednisone 10 mg daily which has not helped so far  I am going to prescribe prednisone 40 mg daily for the next 5 days and add a tessalon elly to her regimen  I wary of using codeine  Since she is already on the clindamycin, would continue clindamycin the would not favor switching her to Zithromax as she suggested initially

## 2019-07-18 NOTE — TELEPHONE ENCOUNTER
Patient was in on Tuesday with a sore throat and is now feeling worse after being on the medication  Patient states that it is now in her chest and she has a bad cough  Patient would like to know if a different or another medication can be called in

## 2019-07-27 DIAGNOSIS — E05.00 GRAVES' DISEASE: ICD-10-CM

## 2019-07-29 RX ORDER — METHIMAZOLE 5 MG/1
TABLET ORAL
Qty: 90 TABLET | Refills: 1 | Status: SHIPPED | OUTPATIENT
Start: 2019-07-29 | End: 2020-01-22

## 2019-07-30 DIAGNOSIS — G47.09 OTHER INSOMNIA: ICD-10-CM

## 2019-07-31 RX ORDER — PREDNISONE 1 MG/1
TABLET ORAL
Refills: 0 | COMMUNITY
Start: 2019-07-18 | End: 2019-08-07

## 2019-07-31 RX ORDER — ZOLPIDEM TARTRATE 10 MG/1
10 TABLET ORAL EVERY EVENING
Qty: 30 TABLET | Refills: 1 | Status: SHIPPED | OUTPATIENT
Start: 2019-07-31 | End: 2020-02-28

## 2019-08-07 ENCOUNTER — OFFICE VISIT (OUTPATIENT)
Dept: FAMILY MEDICINE CLINIC | Facility: CLINIC | Age: 39
End: 2019-08-07
Payer: COMMERCIAL

## 2019-08-07 VITALS
OXYGEN SATURATION: 98 % | HEART RATE: 88 BPM | BODY MASS INDEX: 25.94 KG/M2 | DIASTOLIC BLOOD PRESSURE: 80 MMHG | WEIGHT: 181.2 LBS | SYSTOLIC BLOOD PRESSURE: 120 MMHG | HEIGHT: 70 IN

## 2019-08-07 DIAGNOSIS — G47.09 OTHER INSOMNIA: ICD-10-CM

## 2019-08-07 DIAGNOSIS — J45.20 MILD INTERMITTENT REACTIVE AIRWAY DISEASE WITHOUT COMPLICATION: Primary | ICD-10-CM

## 2019-08-07 DIAGNOSIS — E05.00 GRAVES' DISEASE: ICD-10-CM

## 2019-08-07 PROCEDURE — 1036F TOBACCO NON-USER: CPT | Performed by: NURSE PRACTITIONER

## 2019-08-07 PROCEDURE — 99213 OFFICE O/P EST LOW 20 MIN: CPT | Performed by: NURSE PRACTITIONER

## 2019-08-07 RX ORDER — FLUTICASONE PROPIONATE 44 UG/1
2 AEROSOL, METERED RESPIRATORY (INHALATION) 2 TIMES DAILY
Qty: 1 INHALER | Refills: 0 | Status: SHIPPED | OUTPATIENT
Start: 2019-08-07 | End: 2019-11-04

## 2019-08-07 NOTE — PROGRESS NOTES
Assessment/Plan:       Diagnoses and all orders for this visit:    Mild intermittent reactive airway disease without complication  -     fluticasone (FLOVENT HFA) 44 mcg/act inhaler; Inhale 2 puffs 2 (two) times a day for 14 days Rinse mouth after use  Other insomnia    Graves' disease      Will increase Methimazole to 10 mg  Will have her complete a 14 day course of Flovent inhaler BID to help decrease airway inflammation as she is using her Albuterol inhaler frequently since being ill  Will have her return in 3 months to re-evaluate, if she continues with difficulty sleeping, may change her sleep medication  I suspect the taste of blood in her mouth when running is due to airway irritation  Subjective:      Patient ID: James Giraldo is a 35 y o  female  Here today for a follow-up  Reports she is still having difficulty sleeping, still feeling "not herself," still having issues with her nails/hair and believes she may need to increase her thyroid medication  Also notes she has been using her albuterol inhaler more frequently since her last illness this past July  States of some congestion and continued cough  Also notes of having the taste of blood in her throat, mouth when running during cold weather  Has had an EGD in the past which did not show any erosion or results of GERD  The following portions of the patient's history were reviewed and updated as appropriate: allergies, current medications, past family history, past medical history, past social history, past surgical history and problem list     Review of Systems   Constitutional: Negative  Respiratory: Positive for cough and shortness of breath  Cardiovascular: Negative      Skin:        Please see HPI         Objective:      /80 (BP Location: Right arm, Patient Position: Sitting, Cuff Size: Standard)   Pulse 88   Ht 5' 10" (1 778 m)   Wt 82 2 kg (181 lb 3 2 oz)   SpO2 98%   BMI 26 00 kg/m²          Physical Exam Constitutional: She is oriented to person, place, and time  She appears well-developed and well-nourished  HENT:   Head: Normocephalic and atraumatic  Cardiovascular: Normal rate, regular rhythm and normal heart sounds  Pulmonary/Chest: Effort normal and breath sounds normal  No respiratory distress  Neurological: She is alert and oriented to person, place, and time  Psychiatric: She has a normal mood and affect  Her behavior is normal  Judgment and thought content normal    Vitals reviewed  I have spent 15 minutes with Patient  today in which greater than 50% of this time was spent in counseling/coordination of care regarding Risks and benefits of tx options, Intructions for management and Impressions  BMI Counseling: Body mass index is 26 kg/m²  Discussed the patient's BMI with her  The BMI is above average  BMI counseling and education was provided to the patient  Exercise recommendations include exercising 3-5 times per week

## 2019-08-31 DIAGNOSIS — J45.20 MILD INTERMITTENT REACTIVE AIRWAY DISEASE WITHOUT COMPLICATION: ICD-10-CM

## 2019-09-03 NOTE — TELEPHONE ENCOUNTER
Can we call [facility] when we're able to ask if she is indeed requesting an additional Flovent inhaler or not? It was ordered temporarily due to the residual cough she was having after her illness  The pharmacy sent the request to us, so I just want verification before I reorder it

## 2019-09-04 RX ORDER — FLUTICASONE PROPIONATE 44 MCG
AEROSOL WITH ADAPTER (GRAM) INHALATION
Qty: 10.6 INHALER | Refills: 0 | OUTPATIENT
Start: 2019-09-04

## 2019-09-04 NOTE — TELEPHONE ENCOUNTER
Patient called back and stated that she is no longer using the inhaler and she us feeling much better

## 2019-09-27 DIAGNOSIS — G47.09 OTHER INSOMNIA: ICD-10-CM

## 2019-09-30 ENCOUNTER — TELEPHONE (OUTPATIENT)
Dept: FAMILY MEDICINE CLINIC | Facility: CLINIC | Age: 39
End: 2019-09-30

## 2019-10-02 RX ORDER — ZOLPIDEM TARTRATE 10 MG/1
10 TABLET ORAL EVERY EVENING
Qty: 30 TABLET | Refills: 0 | OUTPATIENT
Start: 2019-10-02

## 2019-11-04 ENCOUNTER — OFFICE VISIT (OUTPATIENT)
Dept: OBGYN CLINIC | Facility: CLINIC | Age: 39
End: 2019-11-04
Payer: COMMERCIAL

## 2019-11-04 VITALS
SYSTOLIC BLOOD PRESSURE: 118 MMHG | DIASTOLIC BLOOD PRESSURE: 80 MMHG | BODY MASS INDEX: 26.2 KG/M2 | HEIGHT: 70 IN | WEIGHT: 183 LBS

## 2019-11-04 DIAGNOSIS — Z12.4 CERVICAL CANCER SCREENING: ICD-10-CM

## 2019-11-04 DIAGNOSIS — F52.31 ANORGASMIA OF FEMALE: ICD-10-CM

## 2019-11-04 DIAGNOSIS — Z01.419 ENCOUNTER FOR GYNECOLOGICAL EXAMINATION (GENERAL) (ROUTINE) WITHOUT ABNORMAL FINDINGS: Primary | ICD-10-CM

## 2019-11-04 DIAGNOSIS — Z12.31 ENCOUNTER FOR SCREENING MAMMOGRAM FOR BREAST CANCER: ICD-10-CM

## 2019-11-04 DIAGNOSIS — N63.20 LEFT BREAST MASS: ICD-10-CM

## 2019-11-04 PROBLEM — N64.3 GALACTORRHEA IN FEMALE: Status: RESOLVED | Noted: 2018-07-03 | Resolved: 2019-11-04

## 2019-11-04 PROCEDURE — S0610 ANNUAL GYNECOLOGICAL EXAMINA: HCPCS | Performed by: OBSTETRICS & GYNECOLOGY

## 2019-11-04 NOTE — LETTER
November 4, 2019     Patient: Demetrius Tavera   YOB: 1980   Date of Visit: 11/4/2019       To Whom it May Concern:    Demetrius Tavera is under my professional care  She was seen in my office on 11/4/2019  She may return to work on 11/4/19  If you have any questions or concerns, please don't hesitate to call           Sincerely,          Lina Navarro MD        CC: No Recipients

## 2019-11-04 NOTE — PROGRESS NOTES
Assessment        Diagnoses and all orders for this visit:    Encounter for gynecological examination (general) (routine) without abnormal findings    Cervical cancer screening    Left breast mass    Encounter for screening mammogram for breast cancer  -     Mammo screening bilateral w 3d & cad; Future    Anorgasmia of female  -     Prolactin; Future    Other orders  -     Cancel: Liquid-based pap, screening                  Plan      All questions answered  Breast self exam technique reviewed and patient encouraged to perform self-exam monthly  Discussed healthy lifestyle modifications  Educational material distributed  Follow up in 1 year  Follow up as needed  Mammogram  - pt would like to continue to have mammograms at [place]    PAP deferred    Reviewed ASCCP recommendations for cervical cytology with patient  It is recommended to start screening at age of 24  Women aged 21-29 should be screened with cytology alone every 3 years  Women aged 33-67 should be screened with cytology with HPV co-testing every 5 years or cytology alone every 3 years  Women older than 72 do not need screening if they had adequate negative screening in the past (3 consecutive negative cytology or 2 negative co-tests) 10 years  Women who underwent total hysterectomy for benign indications and do no have a history of JEREMY 2 or higher do not need cervical cytology screening  Prolactin checked due to history of an orgasm at that is secondary  Patient counseled on potential causes and potential treatment which could include couple 6 therapy which she declines for now  We will call patient with prolactin levels  Patient does have a history of a pituitary lesion that is being followed  Discussed with patient that methimazole in general has not been known to cause anorgasmia but truly any endocrine disorder can cause this        Subjective      Obdulia Carlo is a 35 y o  female who presents for annual exam     Patient with h/o benign nodule, L breast that was biopsied  US on 5/15/19 shows Biopsy-proven benign nodule in the left lower breast has decreased in size status post biopsy  Biopsy-proven benign nodule in the left lower breast has decreased in size status post biopsy  Recommend routine screening    Patient with h/o genital warts s/p ablation and cryo by Dr Reta You  Patient states they are no longer present  Pt with h/o silicone implants (13 years)  Patient complaining of decreased arousal, anorgasmia since being on thyroid medication  She still has desire for sex with her   She denies vaginal dryness  Patient states using a vibrator, she it take about 20 mins which feels like forever  Last Pap: 18 negative  Last mammogram: yes 19    Current contraception: vasectomy  History of abnormal Pap smear: no  History of abnormal mammogram: yes - breast nodule L  Family history of uterine or ovarian cancer: ovary (grandmother at 48)  Family history of breast cancer: yes- mother (61)     Menstrual History:  OB History        2    Para   2    Term   2       0    AB   0    Living   2       SAB   0    TAB   0    Ectopic   0    Multiple   0    Live Births   2                  Patient's last menstrual period was 10/21/2019 (exact date)  Past Medical History:   Diagnosis Date    Anemia     Disease of thyroid gland     hyperthyroid    Foot pain, left     She fractured a bone in her left foot  Had severe pain associated with redness  Was given the diagnosis of reflex sympathetic dystrophy  Has had nerve blocks which were ineffective   Galactorrhea in female 11/10/2015    Genital warts     Graves' disease 7/3/2018    TSI positive  Intolerant of TPU  Switched to methimazole 5 mg daily by endocrinologist   TSH in 2018 0 005 with normal T3 & T4  Followed by endocrinology at [facility] Dr Abraham Peterson   Hepatitis B 1998    Diagnosed with acute Hepatitis B    Subsequent Hep B Surface Ag negative, Surface Ab positive  HCV negative, HIV negative 2010   Hypertrophic scar 07/12/2012    Iron deficiency anemia due to chronic blood loss 7/3/2018     Hemoglobin 11 4 in June of 2018 with a ferritin of 10  Presumed secondary to menstrual losses   Kidney stone     Has passed several kidney stones  No prior kidney stone workup  Try to stay well hydrated  No kidney stones for several years      JEFF III (vulvar intraepithelial neoplasia III) 01/2015     Past Surgical History:   Procedure Laterality Date    APPENDECTOMY  2001    BREAST IMPLANT Bilateral 2008    CHOLECYSTECTOMY  2004    FOOT SURGERY  2013    REVISION OF SCAR  02/18/2012    appendectomy scar revised     Social History     Socioeconomic History    Marital status: /Civil Union     Spouse name: Not on file    Number of children: Not on file    Years of education: Not on file    Highest education level: Not on file   Occupational History    Not on file   Social Needs    Financial resource strain: Not on file    Food insecurity:     Worry: Not on file     Inability: Not on file    Transportation needs:     Medical: Not on file     Non-medical: Not on file   Tobacco Use    Smoking status: Never Smoker    Smokeless tobacco: Never Used   Substance and Sexual Activity    Alcohol use: No    Drug use: No    Sexual activity: Yes     Partners: Male     Birth control/protection: Male Sterilization   Lifestyle    Physical activity:     Days per week: Not on file     Minutes per session: Not on file    Stress: Not on file   Relationships    Social connections:     Talks on phone: Not on file     Gets together: Not on file     Attends [employer] service: Not on file     Active member of club or organization: Not on file     Attends meetings of clubs or organizations: Not on file     Relationship status: Not on file    Intimate partner violence:     Fear of current or ex partner: Not on file     Emotionally abused: Not on file     Physically abused: Not on file     Forced sexual activity: Not on file   Other Topics Concern    Not on file   Social History Narrative    Works as a  for [employer]  Current Outpatient Medications:     Albuterol Sulfate (PROAIR RESPICLICK) 392 (90 Base) MCG/ACT AEPB, Inhale 2 puffs every 4 (four) hours, Disp: 1 each, Rfl: 1    Ascorbic Acid (VITAMIN C) 250 MG CHEW, Chew 500 mg daily, Disp: , Rfl:     ferrous sulfate 324 (65 Fe) mg, Take 324 mg by mouth every other day, Disp: , Rfl:     methimazole (TAPAZOLE) 5 mg tablet, TAKE 1 TABLET BY MOUTH EVERY DAY, Disp: 90 tablet, Rfl: 1    zolpidem (AMBIEN) 10 mg tablet, Take 1 tablet (10 mg total) by mouth every evening, Disp: 30 tablet, Rfl: 1    Allergies   Allergen Reactions    Sulfa Antibiotics      Causes urinary symptoms    Aspirin Rash     GI upset    Penicillins Rash     GI upset           Review of Systems   Constitutional: Negative  HENT: Negative  Eyes: Negative  Respiratory: Negative  Cardiovascular: Negative  Gastrointestinal: Negative  Endocrine: Negative  Genitourinary:        As noted in HPI   Musculoskeletal: Negative  Skin: Negative  Allergic/Immunologic: Negative  Neurological: Negative  Hematological: Negative  Psychiatric/Behavioral: Negative  /80 (BP Location: Right arm, Patient Position: Sitting, Cuff Size: Standard)   Ht 5' 10" (1 778 m)   Wt 83 kg (183 lb)   LMP 10/21/2019 (Exact Date)   BMI 26 26 kg/m²         Physical Exam   Constitutional: She is oriented to person, place, and time  She appears well-developed and well-nourished  Genitourinary: Rectum normal, vagina normal and uterus normal  Pelvic exam was performed with patient supine  There is no rash or lesion on the right labia  There is no rash or lesion on the left labia  Vagina exhibits rugosity  Vagina exhibits no lesion  No bleeding in the vagina  No vaginal discharge found  Right adnexum does not display mass, does not display tenderness and does not display fullness  Left adnexum does not display mass, does not display tenderness and does not display fullness  Cervix does not exhibit motion tenderness, lesion or polyp  Uterus is not enlarged or tender  Rectal exam shows no external hemorrhoid  Genitourinary Comments: Perineum and anus: Normal    Pelvic support  - Vaginal outlet: normal  - Anterior vaginal wall: normal  - Posterior vaginal wall: normal    Bladder: normal    Urethra: normal    Urethral support: normal       HENT:   Head: Normocephalic  Neck: No thyromegaly present  Cardiovascular: Normal rate, regular rhythm and normal heart sounds  No murmur heard  Pulmonary/Chest: Effort normal and breath sounds normal  No respiratory distress  She has no wheezes  She has no rales  Right breast exhibits no mass, no nipple discharge, no skin change and no tenderness  Left breast exhibits no mass, no nipple discharge, no skin change and no tenderness  Abdominal: Soft  She exhibits no distension and no mass  There is no tenderness  There is no rebound and no guarding  Musculoskeletal: She exhibits no edema or tenderness  Lymphadenopathy:        Right: No inguinal adenopathy present  Left: No inguinal adenopathy present  Neurological: She is alert and oriented to person, place, and time  Skin: Skin is warm and dry  Psychiatric: She has a normal mood and affect

## 2019-11-04 NOTE — PATIENT INSTRUCTIONS
Breast Self Exam for Women   WHAT YOU NEED TO KNOW:   A BSE is a way to check your breasts for lumps and other changes  Regular BSEs can help you know how your breasts normally look and feel  Most breast lumps or changes are not cancer, but you should always have them checked by a healthcare provider  Your healthcare provider can also watch you do a BSE and can tell you if you are doing your BSE correctly  DISCHARGE INSTRUCTIONS:   Contact your healthcare provider if:   · You find any lumps or changes in your breasts  · You have breast pain or fluid coming from your nipples  · You have questions or concerns about your condition or care  Why you should do a BSE:  Breast cancer is the most common type of cancer in women  Even if you have mammograms, you may still want to do a BSE regularly  If you know how your breasts normally feel and look, it may help you know when to contact your healthcare provider  Mammograms can miss some cancers  You may find a lump during a BSE that did not show up on your mammogram   When you should do a BSE:  Fredy Chappello your calendar to help you remember to do BSE on a regular schedule  One easy way to remember to do a BSE is to do the exam on the same day of each month  If you have periods, you may want to do your BSE 1 week after your period ends  This is the time when your breasts may be the least swollen, lumpy, or tender  You can do regular BSEs even if you are breastfeeding or have breast implants  How to do a BSE:   · Look at your breasts in a mirror  Look at the size and shape of each breast and nipple  Check for swelling, lumps, dimpling, scaly skin, or other skin changes  Look for nipple changes, such as a nipple that is painful or beginning to pull inward  Gently squeeze both nipples and check to see if fluid (that is not breast milk) comes out of them  If you find any of these or other breast changes, contact your healthcare provider   Check your breasts while you sit or  the following 3 positions:    [facility] your arms down at your sides  ¨ Raise your hands and join them behind your head  ¨ Put firm pressure with your hands on your hips  Bend slightly forward while you look at your breasts in the mirror  · Lie down and feel your breasts  When you lie down, your breast tissue spreads out evenly over your chest  This makes it easier for you to feel for lumps and anything that may not be normal for your breasts  Do a BSE on one breast at a time  ¨ Place a small pillow or towel under your left shoulder  Put your left arm behind your head  ¨ Use the 3 middle fingers of your right hand  Use your fingertip pads, on the top of your fingers  Your fingertip pad is the most sensitive part of your finger  ¨ Use small circles to feel your breast tissue  Use your fingertip pads to make dime-sized, overlapping circles on your breast and armpits  Use light, medium, and firm pressure  First, press lightly  Second, press with medium pressure to feel a little deeper into the breast  Last, use firm pressure to feel deep within your breast     ¨ Examine your entire breast area  Examine the breast area from above the breast to below the breast where you feel only ribs  Make small circles with your fingertips, starting in the middle of your armpit  Make circles going up and down the breast area  Continue toward your breast and all the way across it  Examine the area from your armpit all the way over to the middle of your chest (breastbone)  Stop at the middle of your chest     ¨ Move the pillow or towel to your right shoulder, and put your right arm behind your head  Use the 3 fingertip pads of your left hand, and repeat the above steps to do a BSE on your right breast        What else you can do to check for breast problems or cancer:  Some experts suggest that women 36years of age or older should have a mammogram every year   Other experts suggest that women between the ages of 48and 76years old should have a mammogram every 2 years  Talk to your healthcare provider about when you should have a mammogram   Follow up with your healthcare provider as directed: Your healthcare provider can watch you and tell you if you are doing your BSE correctly  Write down your questions so you remember to ask them during your visits  © 2017 [employer] Information is for End User's use only and may not be sold, redistributed or otherwise used for commercial purposes  All illustrations and images included in CareNotes® are the copyrighted property of [employer]  or Harry Schultz  The above information is an  only  It is not intended as medical advice for individual conditions or treatments  Talk to your doctor, nurse or pharmacist before following any medical regimen to see if it is safe and effective for you  Wellness Visit for Adults   AMBULATORY CARE:   A wellness visit  is when you see your healthcare provider to get screened for health problems  You can also get advice on how to stay healthy  Write down your questions so you remember to ask them  Ask your healthcare provider how often you should have a wellness visit  What happens at a wellness visit:  Your healthcare provider will ask about your health, and your family history of health problems  This includes high blood pressure, heart disease, and cancer  He or she will ask if you have symptoms that concern you, if you smoke, and about your mood  You may also be asked about your intake of medicines, supplements, food, and alcohol  Any of the following may be done:  · Your weight  will be checked  Your height may also be checked so your body mass index (BMI) can be calculated  Your BMI shows if you are at a healthy weight  · Your blood pressure  and heart rate will be checked  Your temperature may also be checked  · Blood and urine tests  may be done   Blood tests may be done to check your cholesterol levels  Abnormal cholesterol levels increase your risk for heart disease and stroke  You may also need a blood or urine test to check for diabetes if you are at increased risk  Urine tests may be done to look for signs of an infection or kidney disease  · A physical exam  includes checking your heartbeat and lungs with a stethoscope  Your healthcare provider may also check your skin to look for sun damage  · Screening tests  may be recommended  A screening test is done to check for diseases that may not cause symptoms  The screening tests you may need depend on your age, gender, family history, and lifestyle habits  For example, colorectal screening may be recommended if you are 48years old or older  Screening tests you need if you are a woman:   · A Pap smear  is used to screen for cervical cancer  Pap smears are usually done every 3 to 5 years depending on your age  You may need them more often if you have had abnormal Pap smear test results in the past  Ask your healthcare provider how often you should have a Pap smear  · A mammogram  is an x-ray of your breasts to screen for breast cancer  Experts recommend mammograms every 2 years starting at age 48 years  You may need a mammogram at age 52 years or younger if you have an increased risk for breast cancer  Talk to your healthcare provider about when you should start having mammograms and how often you need them  Vaccines you may need:   · Get an influenza vaccine  every year  The influenza vaccine protects you from the flu  Several types of viruses cause the flu  The viruses change over time, so new vaccines are made each year  · Get a tetanus-diphtheria (Td) booster vaccine  every 10 years  This vaccine protects you against tetanus and diphtheria  Tetanus is a severe infection that may cause painful muscle spasms and lockjaw   Diphtheria is a severe bacterial infection that causes a thick covering in the back of your mouth and throat  · Get a human papillomavirus (HPV) vaccine  if you are female and aged 23 to 32 or male 23 to 24 and never received it  This vaccine protects you from HPV infection  HPV is the most common infection spread by sexual contact  HPV may also cause vaginal, penile, and anal cancers  · Get a pneumococcal vaccine  if you are aged 72 years or older  The pneumococcal vaccine is an injection given to protect you from pneumococcal disease  Pneumococcal disease is an infection caused by pneumococcal bacteria  The infection may cause pneumonia, meningitis, or an ear infection  · Get a shingles vaccine  if you are aged 61 or older, even if you have had shingles before  The shingles vaccine is an injection to protect you from the varicella-zoster virus  This is the same virus that causes chickenpox  Shingles is a painful rash that develops in people who had chickenpox or have been exposed to the virus  How to eat healthy:  My Plate is a model for planning healthy meals  It shows the types and amounts of foods that should go on your plate  Fruits and vegetables make up about half of your plate, and grains and protein make up the other half  A serving of dairy is included on the side of your plate  The amount of calories and serving sizes you need depends on your age, gender, weight, and height  Examples of healthy foods are listed below:  · Eat a variety of vegetables  such as dark green, red, and orange vegetables  You can also include canned vegetables low in sodium (salt) and frozen vegetables without added butter or sauces  · Eat a variety of fresh fruits , canned fruit in 100% juice, frozen fruit, and dried fruit  · Include whole grains  At least half of the grains you eat should be whole grains   Examples include whole-wheat bread, wheat pasta, brown rice, and whole-grain cereals such as oatmeal     · Eat a variety of protein foods such as seafood (fish and shellfish), lean meat, and poultry without skin (turkey and chicken)  Examples of lean meats include pork leg, shoulder, or tenderloin, and beef round, sirloin, tenderloin, and extra lean ground beef  Other protein foods include eggs and egg substitutes, beans, peas, soy products, nuts, and seeds  · Choose low-fat dairy products such as skim or 1% milk or low-fat yogurt, cheese, and cottage cheese  · Limit unhealthy fats  such as butter, hard margarine, and shortening  Exercise:  Exercise at least 30 minutes per day on most days of the week  Some examples of exercise include walking, biking, dancing, and swimming  You can also fit in more physical activity by taking the stairs instead of the elevator or parking farther away from stores  Include muscle strengthening activities 2 days each week  Regular exercise provides many health benefits  It helps you manage your weight, and decreases your risk for type 2 diabetes, heart disease, stroke, and high blood pressure  Exercise can also help improve your mood  Ask your healthcare provider about the best exercise plan for you  General health and safety guidelines:   · Do not smoke  Nicotine and other chemicals in cigarettes and cigars can cause lung damage  Ask your healthcare provider for information if you currently smoke and need help to quit  E-cigarettes or smokeless tobacco still contain nicotine  Talk to your healthcare provider before you use these products  · Limit alcohol  A drink of alcohol is 12 ounces of beer, 5 ounces of wine, or 1½ ounces of liquor  · Lose weight, if needed  Being overweight increases your risk of certain health conditions  These include heart disease, high blood pressure, type 2 diabetes, and certain types of cancer  · Protect your skin  Do not sunbathe or use tanning beds  Use sunscreen with a SPF 15 or higher  Apply sunscreen at least 15 minutes before you go outside  Reapply sunscreen every 2 hours   Wear protective clothing, hats, and sunglasses when you are outside  · Drive safely  Always wear your seatbelt  Make sure everyone in your car wears a seatbelt  A seatbelt can save your life if you are in an accident  Do not use your cell phone when you are driving  This could distract you and cause an accident  Pull over if you need to make a call or send a text message  · Practice safe sex  Use latex condoms if are sexually active and have more than one partner  Your healthcare provider may recommend screening tests for sexually transmitted infections (STIs)  · Wear helmets, lifejackets, and protective gear  Always wear a helmet when you ride a bike or motorcycle, go skiing, or play sports that could cause a head injury  Wear protective equipment when you play sports  Wear a lifejacket when you are on a boat or doing water sports  © 2017 [employer]  Information is for End User's use only and may not be sold, redistributed or otherwise used for commercial purposes  All illustrations and images included in CareNotes® are the copyrighted property of [employer]  or Harry Schultz  The above information is an  only  It is not intended as medical advice for individual conditions or treatments  Talk to your doctor, nurse or pharmacist before following any medical regimen to see if it is safe and effective for you  It was a pleasure seeing you today  You may receive a survey in the mail or in your e-mail regarding today's visit  Your feedback is very important to us and allows us to improve our service to you and our community  Please take time to complete the survey  Please reach out to us anytime if you have any questions or concerns

## 2019-11-11 ENCOUNTER — TELEPHONE (OUTPATIENT)
Dept: FAMILY MEDICINE CLINIC | Facility: CLINIC | Age: 39
End: 2019-11-11

## 2019-11-11 ENCOUNTER — OFFICE VISIT (OUTPATIENT)
Dept: FAMILY MEDICINE CLINIC | Facility: CLINIC | Age: 39
End: 2019-11-11
Payer: COMMERCIAL

## 2019-11-11 VITALS
HEIGHT: 70 IN | SYSTOLIC BLOOD PRESSURE: 122 MMHG | WEIGHT: 184.53 LBS | OXYGEN SATURATION: 96 % | BODY MASS INDEX: 26.42 KG/M2 | DIASTOLIC BLOOD PRESSURE: 82 MMHG | HEART RATE: 74 BPM

## 2019-11-11 DIAGNOSIS — E05.90 HYPERTHYROIDISM: Primary | ICD-10-CM

## 2019-11-11 DIAGNOSIS — R59.9 ENLARGED LYMPH NODE: ICD-10-CM

## 2019-11-11 DIAGNOSIS — D17.1 LIPOMA OF TORSO: ICD-10-CM

## 2019-11-11 DIAGNOSIS — E05.00 GRAVES' DISEASE: ICD-10-CM

## 2019-11-11 DIAGNOSIS — R68.82 DECREASED LIBIDO: ICD-10-CM

## 2019-11-11 DIAGNOSIS — D50.0 IRON DEFICIENCY ANEMIA DUE TO CHRONIC BLOOD LOSS: ICD-10-CM

## 2019-11-11 DIAGNOSIS — Z23 NEEDS FLU SHOT: ICD-10-CM

## 2019-11-11 DIAGNOSIS — G47.09 OTHER INSOMNIA: ICD-10-CM

## 2019-11-11 DIAGNOSIS — R22.0 FACIAL SWELLING: ICD-10-CM

## 2019-11-11 PROCEDURE — 99214 OFFICE O/P EST MOD 30 MIN: CPT | Performed by: NURSE PRACTITIONER

## 2019-11-11 PROCEDURE — 1036F TOBACCO NON-USER: CPT | Performed by: NURSE PRACTITIONER

## 2019-11-11 RX ORDER — AMOXICILLIN 500 MG/1
500 CAPSULE ORAL 3 TIMES DAILY
Refills: 0 | COMMUNITY
Start: 2019-11-06 | End: 2020-05-26

## 2019-11-11 NOTE — TELEPHONE ENCOUNTER
Can we let Jenny Garza know that per our med database, the Ambien was only filled on 10/27 so it is too soon for us to fill it for her

## 2019-11-11 NOTE — PATIENT INSTRUCTIONS
Try increasing to two Methimazole tablets a day  You may receive a survey in the mail, or by e-mail, please fill it out, and let us know how we did! Thank you again for choosing [facility]!

## 2019-11-11 NOTE — PROGRESS NOTES
Assessment/Plan:       Diagnoses and all orders for this visit:    Hyperthyroidism  -     TSH, 3rd generation; Future    Needs flu shot  -     influenza vaccine, 8187-6306, quadrivalent, 0 5 mL, preservative-free, for adult and pediatric patients 6 mos+ (AFLURIA, FLUARIX, FLULAVAL, FLUZONE)    Facial swelling    Graves' disease  -     TSH, 3rd generation; Future    Iron deficiency anemia due to chronic blood loss  -     CBC and differential; Future  -     Iron Panel (Includes Ferritin, Iron Sat%, Iron, and TIBC); Future    Decreased libido    Other insomnia    Lipoma of torso    Enlarged lymph node    Other orders  -     amoxicillin (AMOXIL) 500 mg capsule; Take 500 mg by mouth 3 (three) times a day      Will have her increase her Methimazole to 10 mg and recheck her TSH in 4 weeks  Although her left facial area is tender, there is no obvious swelling and there appears to be no infection in her mouth on the upper left mouth region  Will have her recheck her blood count and iron levels due to history of anemia and iron supplementation  Discussed her decreased libido and how it is difficult for a female to get an increase back for it  Area on her left posterior lower torso palpates as a lipoma, is moveable,  Not painful, has been present for some time  Area behind left auricle is not palpable at this time but suspect it is a lymph-node due to the location  Blood work ordered to have drawn with her GYN lab work and 4 weeks later for TSH after increasing Methimazole  Subjective:      Patient ID: Bob Parisi is a 35 y o  female  Here today for a follow-up  She is with a hx of Graves Disease and Hyperthyroidism - currently on Methimazole for control  Discussed at last appt about increasing her dosage to 10 mg as she was feeling fatigue and unwell - she did not at that time but is willing to do so after this appointment    Reports she had a tooth pulled in her left upper jaw area - states she is on Amoxicillin for it, notes of some pain in her left facial cheek area - would like me to look at it to double check for infection  She continues with insomnia at intervals, still takes Ambien to help aid with sleep  Reports of continuing decreased libido - was seen by Gyn one week ago for her annual   Hx of iron deficiency anemia - currently takes daily iron supplementation  Keeps forgetting to  her daily multivitamin but is still taking her iron  States of a tender area behind her left auricle that is a "lump" that gets bigger then smaller  Also with a lipoma on her left lower back area that she would like to have looked at because she feels it is getting larger  The following portions of the patient's history were reviewed and updated as appropriate: allergies, current medications, past family history, past medical history, past social history, past surgical history and problem list     Review of Systems   Constitutional: Positive for fatigue  HENT:        Please see HPI   Respiratory: Negative  Endocrine:        Please see HPI   Skin:        Please see HPI   Hematological:        Please see HPI     All other systems reviewed and are negative  Objective:      /82 (BP Location: Right arm, Patient Position: Sitting, Cuff Size: Standard)   Pulse 74   Ht 5' 10" (1 778 m)   Wt 83 7 kg (184 lb 8 4 oz)   LMP 10/21/2019 (Exact Date)   SpO2 96%   BMI 26 48 kg/m²          Physical Exam   Constitutional: She is oriented to person, place, and time  She appears well-developed and well-nourished  HENT:   Head: Normocephalic and atraumatic  Eyes: Conjunctivae and EOM are normal    Neck: Normal range of motion  Neck supple  No thyromegaly present  Cardiovascular: Normal rate, regular rhythm and normal heart sounds  Exam reveals no gallop and no friction rub  No murmur heard  Pulmonary/Chest: Effort normal and breath sounds normal  No stridor  No respiratory distress   She has no wheezes  Musculoskeletal:        Back:    Lymphadenopathy:        Head (right side): No preauricular and no posterior auricular adenopathy present  Head (left side): No preauricular and no posterior auricular adenopathy present  She has no cervical adenopathy  Neurological: She is alert and oriented to person, place, and time  Skin: Skin is warm and dry  Psychiatric: She has a normal mood and affect  Her behavior is normal  Judgment and thought content normal    Vitals reviewed

## 2019-12-28 DIAGNOSIS — G47.09 OTHER INSOMNIA: ICD-10-CM

## 2019-12-30 RX ORDER — ZOLPIDEM TARTRATE 10 MG/1
10 TABLET ORAL EVERY EVENING
Qty: 30 TABLET | Refills: 0 | OUTPATIENT
Start: 2019-12-30

## 2019-12-30 RX ORDER — ZOLPIDEM TARTRATE 10 MG/1
TABLET ORAL
Qty: 30 TABLET | Refills: 2 | Status: SHIPPED | OUTPATIENT
Start: 2019-12-30 | End: 2020-02-03 | Stop reason: SDUPTHER

## 2020-01-22 DIAGNOSIS — E05.00 GRAVES' DISEASE: ICD-10-CM

## 2020-01-22 RX ORDER — METHIMAZOLE 5 MG/1
TABLET ORAL
Qty: 90 TABLET | Refills: 0 | Status: SHIPPED | OUTPATIENT
Start: 2020-01-22 | End: 2020-04-19

## 2020-02-03 ENCOUNTER — OFFICE VISIT (OUTPATIENT)
Dept: FAMILY MEDICINE CLINIC | Facility: CLINIC | Age: 40
End: 2020-02-03
Payer: COMMERCIAL

## 2020-02-03 VITALS
SYSTOLIC BLOOD PRESSURE: 120 MMHG | BODY MASS INDEX: 24.97 KG/M2 | DIASTOLIC BLOOD PRESSURE: 78 MMHG | HEART RATE: 100 BPM | OXYGEN SATURATION: 97 % | WEIGHT: 174.38 LBS | TEMPERATURE: 98.9 F | HEIGHT: 70 IN

## 2020-02-03 DIAGNOSIS — R68.89 FLU-LIKE SYMPTOMS: Primary | ICD-10-CM

## 2020-02-03 DIAGNOSIS — R05.9 COUGH: ICD-10-CM

## 2020-02-03 DIAGNOSIS — R09.89 CHEST CONGESTION: ICD-10-CM

## 2020-02-03 DIAGNOSIS — R50.81 FEVER IN OTHER DISEASES: ICD-10-CM

## 2020-02-03 LAB
FLUAV RNA NPH QL NAA+PROBE: DETECTED
FLUBV RNA NPH QL NAA+PROBE: ABNORMAL
RSV RNA NPH QL NAA+PROBE: ABNORMAL

## 2020-02-03 PROCEDURE — 3008F BODY MASS INDEX DOCD: CPT | Performed by: NURSE PRACTITIONER

## 2020-02-03 PROCEDURE — 1036F TOBACCO NON-USER: CPT | Performed by: NURSE PRACTITIONER

## 2020-02-03 PROCEDURE — 99213 OFFICE O/P EST LOW 20 MIN: CPT | Performed by: NURSE PRACTITIONER

## 2020-02-03 PROCEDURE — 87631 RESP VIRUS 3-5 TARGETS: CPT | Performed by: NURSE PRACTITIONER

## 2020-02-03 RX ORDER — OSELTAMIVIR PHOSPHATE 75 MG/1
75 CAPSULE ORAL EVERY 12 HOURS SCHEDULED
Qty: 10 CAPSULE | Refills: 0 | Status: SHIPPED | OUTPATIENT
Start: 2020-02-03 | End: 2020-02-08

## 2020-02-03 NOTE — PROGRESS NOTES
Assessment/Plan:         Diagnoses and all orders for this visit:    Flu-like symptoms  -     Influenza A/B and RSV PCR; Future  -     oseltamivir (TAMIFLU) 75 mg capsule; Take 1 capsule (75 mg total) by mouth every 12 (twelve) hours for 5 days  -     Influenza A/B and RSV PCR    Cough  -     Influenza A/B and RSV PCR; Future  -     oseltamivir (TAMIFLU) 75 mg capsule; Take 1 capsule (75 mg total) by mouth every 12 (twelve) hours for 5 days  -     Influenza A/B and RSV PCR    Chest congestion  -     Influenza A/B and RSV PCR; Future  -     oseltamivir (TAMIFLU) 75 mg capsule; Take 1 capsule (75 mg total) by mouth every 12 (twelve) hours for 5 days  -     Influenza A/B and RSV PCR    Fever in other diseases  -     Influenza A/B and RSV PCR; Future  -     oseltamivir (TAMIFLU) 75 mg capsule; Take 1 capsule (75 mg total) by mouth every 12 (twelve) hours for 5 days  -     Influenza A/B and RSV PCR      Suspect Influenza  Flu swab completed, if negative, will treat for an upper respiratory infection  Explained standard precautions and encouraged her to quarantine herself from the rest of her household  Tamiflu prescribed initially  Rest and fluids encouraged  Subjective:      Patient ID: Bob Parisi is a 35 y o  female  Here today for an acute visit, reports onset of fever, cough, chest congestion starting Saturday  She has been taking Tylenol, Motrin for her fever - reports highest fever 101 3F  States she feels extremely unwell right now  Notes she was in the ED on Saturday with her daughter and everyone around them was sick  The following portions of the patient's history were reviewed and updated as appropriate: allergies, current medications, past family history, past medical history, past social history, past surgical history and problem list     Review of Systems   Constitutional: Negative  HENT: Positive for congestion, rhinorrhea, sinus pressure and sinus pain  Eyes: Negative  Respiratory: Positive for cough and shortness of breath  Objective:      /78 (BP Location: Left arm, Patient Position: Sitting, Cuff Size: Standard)   Pulse 100   Temp 98 9 °F (37 2 °C)   Ht 5' 10" (1 778 m)   Wt 79 1 kg (174 lb 6 1 oz)   SpO2 97%   BMI 25 02 kg/m²          Physical Exam   Constitutional: She is oriented to person, place, and time  She appears ill  HENT:   Head: Normocephalic and atraumatic  Right Ear: Tympanic membrane normal    Left Ear: Tympanic membrane normal    Mouth/Throat: No uvula swelling  Posterior oropharyngeal erythema present  No oropharyngeal exudate  No tonsillar exudate  Neck: Neck supple  No thyromegaly present  Cardiovascular: Normal rate, regular rhythm and normal heart sounds  No murmur heard  Pulmonary/Chest: Effort normal  No respiratory distress  She has no wheezes  She has no rhonchi  She has no rales  Lymphadenopathy:        Head (right side): Submental and submandibular adenopathy present  No tonsillar, no preauricular and no posterior auricular adenopathy present  Head (left side): Submental and submandibular adenopathy present  No tonsillar, no preauricular and no posterior auricular adenopathy present  Neurological: She is alert and oriented to person, place, and time

## 2020-02-03 NOTE — LETTER
February 3, 2020     Patient: Vishnu Estrada   YOB: 1980   Date of Visit: 2/3/2020       To Whom it May Concern:    Vishnu Estrada is under my professional care  She was seen in my office on 2/3/2020  Please excuse her from work on 02/03/2020, and 02/04/2020  If you have any questions or concerns, please don't hesitate to call           Sincerely,          Soo Lugo

## 2020-02-07 ENCOUNTER — TELEPHONE (OUTPATIENT)
Dept: FAMILY MEDICINE CLINIC | Facility: CLINIC | Age: 40
End: 2020-02-07

## 2020-02-07 DIAGNOSIS — R05.9 COUGH: Primary | ICD-10-CM

## 2020-02-07 RX ORDER — PREDNISONE 10 MG/1
10 TABLET ORAL 2 TIMES DAILY WITH MEALS
Qty: 10 TABLET | Refills: 0 | Status: SHIPPED | OUTPATIENT
Start: 2020-02-07 | End: 2020-02-12

## 2020-02-07 NOTE — TELEPHONE ENCOUNTER
Advised patient Rx has been sent in  If she isn't feeling any better she will schedule appt next week

## 2020-02-07 NOTE — TELEPHONE ENCOUNTER
States she is feeling better from the flu but now her cough is settling into her chest like she is getting bronchitis  She is asking if there is something you can give her before the weekend  [facility], [place]

## 2020-02-07 NOTE — TELEPHONE ENCOUNTER
I can send over a steroid for her to help with the cough, it is more than likely a residual cough from the flu due to remaining inflammation in her lungs

## 2020-02-28 DIAGNOSIS — G47.09 OTHER INSOMNIA: ICD-10-CM

## 2020-02-28 RX ORDER — ZOLPIDEM TARTRATE 10 MG/1
TABLET ORAL
Qty: 30 TABLET | Refills: 1 | Status: SHIPPED | OUTPATIENT
Start: 2020-02-28 | End: 2020-10-26 | Stop reason: SDUPTHER

## 2020-04-18 DIAGNOSIS — E05.00 GRAVES' DISEASE: ICD-10-CM

## 2020-04-19 RX ORDER — METHIMAZOLE 5 MG/1
TABLET ORAL
Qty: 90 TABLET | Refills: 0 | Status: SHIPPED | OUTPATIENT
Start: 2020-04-19 | End: 2020-11-23 | Stop reason: SDUPTHER

## 2020-05-22 ENCOUNTER — TELEPHONE (OUTPATIENT)
Dept: FAMILY MEDICINE CLINIC | Facility: CLINIC | Age: 40
End: 2020-05-22

## 2020-05-26 ENCOUNTER — OFFICE VISIT (OUTPATIENT)
Dept: FAMILY MEDICINE CLINIC | Facility: CLINIC | Age: 40
End: 2020-05-26
Payer: COMMERCIAL

## 2020-05-26 VITALS
WEIGHT: 174.6 LBS | TEMPERATURE: 97.4 F | HEIGHT: 70 IN | HEART RATE: 76 BPM | SYSTOLIC BLOOD PRESSURE: 120 MMHG | OXYGEN SATURATION: 97 % | BODY MASS INDEX: 25 KG/M2 | DIASTOLIC BLOOD PRESSURE: 80 MMHG

## 2020-05-26 DIAGNOSIS — G47.09 OTHER INSOMNIA: ICD-10-CM

## 2020-05-26 DIAGNOSIS — E05.00 GRAVES' DISEASE: ICD-10-CM

## 2020-05-26 DIAGNOSIS — Z00.00 ANNUAL PHYSICAL EXAM: Primary | ICD-10-CM

## 2020-05-26 DIAGNOSIS — N63.20 LEFT BREAST MASS: ICD-10-CM

## 2020-05-26 DIAGNOSIS — D50.0 IRON DEFICIENCY ANEMIA DUE TO CHRONIC BLOOD LOSS: ICD-10-CM

## 2020-05-26 PROCEDURE — 3008F BODY MASS INDEX DOCD: CPT | Performed by: NURSE PRACTITIONER

## 2020-05-26 PROCEDURE — 99396 PREV VISIT EST AGE 40-64: CPT | Performed by: NURSE PRACTITIONER

## 2020-07-08 ENCOUNTER — HOSPITAL ENCOUNTER (OUTPATIENT)
Dept: RADIOLOGY | Facility: CLINIC | Age: 40
Discharge: HOME | End: 2020-07-08
Payer: COMMERCIAL

## 2020-07-08 VITALS — BODY MASS INDEX: 23.94 KG/M2 | HEIGHT: 71 IN | WEIGHT: 171 LBS

## 2020-07-08 DIAGNOSIS — Z12.31 ENCOUNTER FOR SCREENING MAMMOGRAM FOR BREAST CANCER: ICD-10-CM

## 2020-07-08 PROCEDURE — 77063 BREAST TOMOSYNTHESIS BI: CPT

## 2020-07-08 PROCEDURE — 77067 SCR MAMMO BI INCL CAD: CPT

## 2020-10-26 DIAGNOSIS — G47.09 OTHER INSOMNIA: ICD-10-CM

## 2020-10-27 RX ORDER — ZOLPIDEM TARTRATE 10 MG/1
10 TABLET ORAL EVERY EVENING
Qty: 30 TABLET | Refills: 0 | Status: SHIPPED | OUTPATIENT
Start: 2020-10-27 | End: 2020-12-01

## 2020-11-05 ENCOUNTER — ANNUAL EXAM (OUTPATIENT)
Dept: OBGYN CLINIC | Facility: CLINIC | Age: 40
End: 2020-11-05
Payer: COMMERCIAL

## 2020-11-05 VITALS
SYSTOLIC BLOOD PRESSURE: 114 MMHG | HEART RATE: 104 BPM | DIASTOLIC BLOOD PRESSURE: 74 MMHG | TEMPERATURE: 97.8 F | WEIGHT: 176.2 LBS | BODY MASS INDEX: 24.67 KG/M2 | HEIGHT: 71 IN

## 2020-11-05 DIAGNOSIS — N64.3 GALACTORRHEA: ICD-10-CM

## 2020-11-05 DIAGNOSIS — N92.0 MENORRHAGIA WITH REGULAR CYCLE: ICD-10-CM

## 2020-11-05 DIAGNOSIS — D50.9 IRON DEFICIENCY ANEMIA, UNSPECIFIED IRON DEFICIENCY ANEMIA TYPE: ICD-10-CM

## 2020-11-05 DIAGNOSIS — Z12.31 ENCOUNTER FOR SCREENING MAMMOGRAM FOR MALIGNANT NEOPLASM OF BREAST: ICD-10-CM

## 2020-11-05 DIAGNOSIS — Z12.4 CERVICAL CANCER SCREENING: ICD-10-CM

## 2020-11-05 DIAGNOSIS — Z01.419 ENCOUNTER FOR GYNECOLOGICAL EXAMINATION (GENERAL) (ROUTINE) WITHOUT ABNORMAL FINDINGS: Primary | ICD-10-CM

## 2020-11-05 DIAGNOSIS — E05.00 GRAVES DISEASE: ICD-10-CM

## 2020-11-05 PROCEDURE — G0145 SCR C/V CYTO,THINLAYER,RESCR: HCPCS | Performed by: OBSTETRICS & GYNECOLOGY

## 2020-11-05 PROCEDURE — S0612 ANNUAL GYNECOLOGICAL EXAMINA: HCPCS | Performed by: OBSTETRICS & GYNECOLOGY

## 2020-11-05 PROCEDURE — 87624 HPV HI-RISK TYP POOLED RSLT: CPT | Performed by: OBSTETRICS & GYNECOLOGY

## 2020-11-05 RX ORDER — MISOPROSTOL 200 UG/1
200 TABLET ORAL DAILY
Qty: 2 TABLET | Refills: 0 | Status: SHIPPED | OUTPATIENT
Start: 2020-11-05 | End: 2021-05-14

## 2020-11-06 LAB
HPV HR 12 DNA CVX QL NAA+PROBE: NEGATIVE
HPV16 DNA CVX QL NAA+PROBE: NEGATIVE
HPV18 DNA CVX QL NAA+PROBE: NEGATIVE

## 2020-11-10 LAB
LAB AP GYN PRIMARY INTERPRETATION: NORMAL
Lab: NORMAL

## 2020-11-23 ENCOUNTER — OFFICE VISIT (OUTPATIENT)
Dept: FAMILY MEDICINE CLINIC | Facility: CLINIC | Age: 40
End: 2020-11-23
Payer: COMMERCIAL

## 2020-11-23 VITALS
WEIGHT: 179.8 LBS | HEART RATE: 78 BPM | HEIGHT: 71 IN | SYSTOLIC BLOOD PRESSURE: 120 MMHG | OXYGEN SATURATION: 98 % | TEMPERATURE: 97.1 F | DIASTOLIC BLOOD PRESSURE: 80 MMHG | BODY MASS INDEX: 25.17 KG/M2

## 2020-11-23 DIAGNOSIS — D50.0 IRON DEFICIENCY ANEMIA DUE TO CHRONIC BLOOD LOSS: ICD-10-CM

## 2020-11-23 DIAGNOSIS — N92.0 MENORRHAGIA WITH REGULAR CYCLE: ICD-10-CM

## 2020-11-23 DIAGNOSIS — E05.00 GRAVES' DISEASE: Primary | ICD-10-CM

## 2020-11-23 DIAGNOSIS — N64.3 GALACTORRHEA: ICD-10-CM

## 2020-11-23 DIAGNOSIS — G47.09 OTHER INSOMNIA: ICD-10-CM

## 2020-11-23 PROCEDURE — 1036F TOBACCO NON-USER: CPT | Performed by: INTERNAL MEDICINE

## 2020-11-23 PROCEDURE — 99214 OFFICE O/P EST MOD 30 MIN: CPT | Performed by: INTERNAL MEDICINE

## 2020-11-23 PROCEDURE — 3008F BODY MASS INDEX DOCD: CPT | Performed by: INTERNAL MEDICINE

## 2020-11-23 RX ORDER — METHIMAZOLE 5 MG/1
TABLET ORAL
Qty: 90 TABLET | Refills: 0 | COMMUNITY
Start: 2020-11-23 | End: 2021-01-29 | Stop reason: SDUPTHER

## 2020-11-24 ENCOUNTER — TELEPHONE (OUTPATIENT)
Dept: FAMILY MEDICINE CLINIC | Facility: CLINIC | Age: 40
End: 2020-11-24

## 2020-11-30 DIAGNOSIS — G47.09 OTHER INSOMNIA: ICD-10-CM

## 2020-12-01 RX ORDER — ZOLPIDEM TARTRATE 10 MG/1
10 TABLET ORAL
Qty: 30 TABLET | Refills: 0 | Status: SHIPPED | OUTPATIENT
Start: 2020-12-01 | End: 2021-01-03 | Stop reason: SDUPTHER

## 2020-12-01 RX ORDER — ZOLPIDEM TARTRATE 10 MG/1
10 TABLET ORAL EVERY EVENING
Qty: 30 TABLET | Refills: 0 | OUTPATIENT
Start: 2020-12-01

## 2020-12-20 ENCOUNTER — PATIENT MESSAGE (OUTPATIENT)
Dept: FAMILY MEDICINE CLINIC | Facility: CLINIC | Age: 40
End: 2020-12-20

## 2021-01-03 DIAGNOSIS — G47.09 OTHER INSOMNIA: ICD-10-CM

## 2021-01-04 RX ORDER — ZOLPIDEM TARTRATE 10 MG/1
TABLET ORAL
Qty: 30 TABLET | Refills: 0 | OUTPATIENT
Start: 2021-01-04

## 2021-01-04 RX ORDER — ZOLPIDEM TARTRATE 10 MG/1
10 TABLET ORAL
Qty: 30 TABLET | Refills: 0 | Status: SHIPPED | OUTPATIENT
Start: 2021-01-04 | End: 2021-01-29 | Stop reason: SDUPTHER

## 2021-01-04 NOTE — TELEPHONE ENCOUNTER
PDMP reviewed  No red flags identified; safe to proceed with prescription      PDMP Review       Value Time User    PDMP Reviewed  Yes 1/4/2021  8:16 AM Yenny Sprague MD

## 2021-01-12 ENCOUNTER — HOSPITAL ENCOUNTER (OUTPATIENT)
Dept: ULTRASOUND IMAGING | Facility: HOSPITAL | Age: 41
Discharge: HOME | End: 2021-01-12
Payer: COMMERCIAL

## 2021-01-12 DIAGNOSIS — N92.0 MENORRHAGIA WITH REGULAR CYCLE: ICD-10-CM

## 2021-01-12 PROCEDURE — 76856 US EXAM PELVIC COMPLETE: CPT

## 2021-01-12 PROCEDURE — 76830 TRANSVAGINAL US NON-OB: CPT

## 2021-01-14 ENCOUNTER — TELEPHONE (OUTPATIENT)
Dept: OBGYN CLINIC | Facility: CLINIC | Age: 41
End: 2021-01-14

## 2021-01-14 NOTE — TELEPHONE ENCOUNTER
Report/images/recent GYN visit reviewed  OK to wait for review by dr Alejandra Gonzalez who is managing menorrhagia/AUB  At visit there was discussion of EMB and eventual Endometrial ablation

## 2021-01-29 DIAGNOSIS — E05.00 GRAVES' DISEASE: ICD-10-CM

## 2021-01-29 DIAGNOSIS — G47.09 OTHER INSOMNIA: ICD-10-CM

## 2021-02-01 RX ORDER — METHIMAZOLE 5 MG/1
TABLET ORAL
Qty: 90 TABLET | Refills: 1 | Status: SHIPPED | OUTPATIENT
Start: 2021-02-01 | End: 2021-08-30 | Stop reason: SDUPTHER

## 2021-02-01 RX ORDER — ZOLPIDEM TARTRATE 10 MG/1
10 TABLET ORAL
Qty: 30 TABLET | Refills: 0 | Status: SHIPPED | OUTPATIENT
Start: 2021-02-01 | End: 2021-03-02 | Stop reason: SDUPTHER

## 2021-02-01 NOTE — TELEPHONE ENCOUNTER
PDMP reviewed  No red flags identified; safe to proceed with prescription      PDMP Review       Value Time User    PDMP Reviewed  Yes 2/1/2021  9:32 AM Mendez Dubose MD

## 2021-03-02 DIAGNOSIS — G47.09 OTHER INSOMNIA: ICD-10-CM

## 2021-03-02 RX ORDER — ZOLPIDEM TARTRATE 10 MG/1
10 TABLET ORAL
Qty: 30 TABLET | Refills: 0 | Status: SHIPPED | OUTPATIENT
Start: 2021-03-02 | End: 2021-04-01 | Stop reason: SDUPTHER

## 2021-03-02 NOTE — TELEPHONE ENCOUNTER
PDMP reviewed  No red flags identified; safe to proceed with prescription      PDMP Review       Value Time User    PDMP Reviewed  Yes 3/2/2021 12:47 PM Cass Brunner, MD

## 2021-03-07 NOTE — PATIENT INSTRUCTIONS
Endometrial Biopsy   WHAT YOU NEED TO KNOW:   Endometrial biopsy is a procedure to remove a tissue sample from the lining of your uterus  This procedure is done through your vagina  DISCHARGE INSTRUCTIONS:   Call your doctor or surgeon if:   · You have severe pain that does not go away after you take pain medicine  · You have a fever  · You have pain or cramping that lasts longer than a few days  · You have white or yellow vaginal discharge  · You have more vaginal bleeding than you were told to expect  · You have questions or concerns about your condition or care  Medicines:   · NSAIDs , such as ibuprofen, help decrease swelling, pain, and fever  NSAIDs can cause stomach bleeding or kidney problems in certain people  If you take blood thinner medicine, always ask your healthcare provider if NSAIDs are safe for you  Always read the medicine label and follow directions  · Take your medicine as directed  Contact your healthcare provider if you think your medicine is not helping or if you have side effects  Tell him or her if you are allergic to any medicine  Keep a list of the medicines, vitamins, and herbs you take  Include the amounts, and when and why you take them  Bring the list or the pill bottles to follow-up visits  Carry your medicine list with you in case of an emergency  Do not have sex, douche, or use a tampon  for at least 10 to 14 days  Follow up with your doctor or surgeon as directed:  Write down your questions so you remember to ask them during your visits  © Copyright [address] Information is for End User's use only and may not be sold, redistributed or otherwise used for commercial purposes  All illustrations and images included in CareNotes® are the copyrighted property of [employer]  or [facility] Concepcion Orozco  The above information is an  only  It is not intended as medical advice for individual conditions or treatments   Talk to your doctor, nurse or pharmacist before following any medical regimen to see if it is safe and effective for you

## 2021-03-09 ENCOUNTER — PROCEDURE VISIT (OUTPATIENT)
Dept: OBGYN CLINIC | Facility: CLINIC | Age: 41
End: 2021-03-09
Payer: COMMERCIAL

## 2021-03-09 VITALS
DIASTOLIC BLOOD PRESSURE: 78 MMHG | BODY MASS INDEX: 24.92 KG/M2 | SYSTOLIC BLOOD PRESSURE: 124 MMHG | HEIGHT: 71 IN | WEIGHT: 178 LBS | TEMPERATURE: 97.7 F

## 2021-03-09 DIAGNOSIS — Z01.812 PRE-PROCEDURE LAB EXAM: ICD-10-CM

## 2021-03-09 DIAGNOSIS — N92.0 MENORRHAGIA WITH REGULAR CYCLE: Primary | ICD-10-CM

## 2021-03-09 DIAGNOSIS — D25.0 FIBROIDS, SUBMUCOSAL: ICD-10-CM

## 2021-03-09 LAB — SL AMB POCT URINE HCG: NEGATIVE

## 2021-03-09 PROCEDURE — 99214 OFFICE O/P EST MOD 30 MIN: CPT | Performed by: OBSTETRICS & GYNECOLOGY

## 2021-03-09 PROCEDURE — 3008F BODY MASS INDEX DOCD: CPT | Performed by: OBSTETRICS & GYNECOLOGY

## 2021-03-09 PROCEDURE — 88305 TISSUE EXAM BY PATHOLOGIST: CPT | Performed by: PATHOLOGY

## 2021-03-09 PROCEDURE — 58100 BIOPSY OF UTERUS LINING: CPT | Performed by: OBSTETRICS & GYNECOLOGY

## 2021-03-09 PROCEDURE — 81025 URINE PREGNANCY TEST: CPT | Performed by: OBSTETRICS & GYNECOLOGY

## 2021-03-09 RX ORDER — SODIUM CHLORIDE, SODIUM LACTATE, POTASSIUM CHLORIDE, CALCIUM CHLORIDE 600; 310; 30; 20 MG/100ML; MG/100ML; MG/100ML; MG/100ML
125 INJECTION, SOLUTION INTRAVENOUS CONTINUOUS
Status: CANCELLED | OUTPATIENT
Start: 2021-05-18

## 2021-03-09 NOTE — PROGRESS NOTES
Assessment/Plan:     Diagnoses and all orders for this visit:    Menorrhagia with regular cycle  -     Case request operating room: DILATATION AND CURETTAGE (D&C) WITH HYSTEROSCOPY, ABLATION ENDOMETRIAL NOVASURE, MYOMECTOMY; Standing  -     Type and screen; Future  -     Basic metabolic panel; Future  -     CBC and Platelet; Future  -     Case request operating room: DILATATION AND CURETTAGE (D&C) WITH HYSTEROSCOPY, ABLATION ENDOMETRIAL NOVASURE, MYOMECTOMY  -     Tissue Exam  -     Endometrial biopsy    Fibroids, submucosal  -     Case request operating room: DILATATION AND CURETTAGE (D&C) WITH HYSTEROSCOPY, ABLATION ENDOMETRIAL NOVASURE, MYOMECTOMY; Standing  -     Type and screen; Future  -     Basic metabolic panel; Future  -     CBC and Platelet; Future  -     Case request operating room: DILATATION AND CURETTAGE (D&C) WITH HYSTEROSCOPY, ABLATION ENDOMETRIAL NOVASURE, MYOMECTOMY  -     Endometrial biopsy    Pre-procedure lab exam  -     POCT urine HCG    Other orders  -     NON FORMULARY; Start with a pea size amount (1 full pump) to spots at night, OR directions per MD  Use [employer] software on [employer] Klaudia to guide you daily   -     Diet NPO; Sips with meds; Standing  -     Void on call to OR; Standing  -     Insert peripheral IV; Standing  -     Place sequential compression device; Standing  -     Shower/scrub; Standing  -     lactated ringers infusion      patient with abnormal with bleeding most likely secondary to submucosal fibroid  Discussed with patient previously treatment options including medical therapy versus endometrial ablation versus Intrauterine device versus tranexamic acid  Patient is returning to the office requesting endometrial ablation  Discussed with patient now finding of submucosal fibroid  I advised removal if feasible via hysteroscopic myomectomy    Discussed with patient that on occasion these submucosal fibroids with interfere with the endometrial ablation and so it is best to remove it at the time of endometrial ablation as well  Discussed with patient hysteroscopic myomectomy and risks including fluid overload uterine perforation as well as incomplete removal of uterine fibroid  Endometrial biopsy was performed  We will call patient with results  Endometrial biopsy was performed to rule out endometrial hyperplasia  She declines a hysterectomy at this point as well as medical therapy  Patient declines hysterectomy  Discussed that a successful result is most likely to be a reduction in the volume of uterine bleeding, and amenorrhea is not guaranteed  The most common complications associated with endometrial ablation are uterine perforation, hemorrhage, hematometra, and pelvic infection  There is a small chance that the procedure many not be completed due to technical difficulties, uterine perforation or irregular endometrial cavity as in her case of submucosal fibroids  Also discussed the potential need for additional procedures or additional surgical intervention in the future such as a hysterectomy if the procedure does not work  Discussed with patient indication, risks, benefits and alternatives of surgical exploration  We discussed possibility of bleeding requiring blood transfusion, life-threatening infections requiring additional procedures, injuries to surrounding organs such as bladder, ureters, gastrointestinal tract and/or neurovascular structures  Additionally, we discussed other general risks associated to stress of surgery including but not limited to venous thromboembolism, acute myocardial events and stroke  Patient understands exam under anesthesia will be performed as part of this procedure and that my associates, including trainees may participate/perform exam and/or portions of the procedure as deemed safe and appropriate  All questions answered to patient's satisfaction  She agrees and wants to proceed   Informed consent form signed    Preop testing, preop instructions and chlorhexidine wash given  Patient to be scheduled for May 18, 2021  Return to the office 2 weeks postoperatively  Patient to be scheduled for a hysteroscopy, D&C, hysteroscopic myomectomy (Ramya Gill), NovaSure endometrial ablation  Subjective   Patient ID: Vitaly Hamilton is a 38 y o  female  Patient is here for a problem visit  Chief Complaint   Patient presents with    Procedure     Endometrial Biopsy      Patient previously seen at about 4 months ago complaining of very heavy periods  Patient states that her periods occur every month, last 5-7 days  Patient states that her periods have worsened with regards to amount since this past year  Patient had a recent pelvic ultrasound that showed thickened endometrium and a submucosal fibroid  Patient with history of 2 prior vaginal deliveries  She is sexually active with her  and her  has had a vasectomy  Hemoglobin is 12 9 on 2020  Menstrual History:  OB History        2    Para   2    Term   2       0    AB   0    Living   2       SAB   0    TAB   0    Ectopic   0    Multiple   0    Live Births   2                  OB History    Para Term  AB Living   2 2 2 0 0 2   SAB TAB Ectopic Multiple Live Births   0 0 0 0 2      # Outcome Date GA Lbr Chao/2nd Weight Sex Delivery Anes PTL Lv   2 Term      Vag-Spont      1 Term      Vag-Spont          Menarche age: 5  Patient's last menstrual period was 2021 (approximate)  Past Medical History:   Diagnosis Date    Anemia     Disease of thyroid gland     hyperthyroid    Foot pain, left     She fractured a bone in her left foot  Had severe pain associated with redness  Was given the diagnosis of reflex sympathetic dystrophy  Has had nerve blocks which were ineffective   Galactorrhea in female 11/10/2015    Genital warts     Graves' disease 7/3/2018    TSI positive  Intolerant of TPU  Switched to methimazole 5 mg daily by endocrinologist   TSH in June of 2018 0 005 with normal T3 & T4  Followed by [facility]ocrinology at [facility]   Hepatitis B 1998    Diagnosed with acute Hepatitis B  Subsequent Hep B Surface Ag negative, Surface Ab positive  HCV negative, HIV negative 2010   Hypertrophic scar 07/12/2012    Iron deficiency anemia due to chronic blood loss 7/3/2018     Hemoglobin 11 4 in June of 2018 with a ferritin of 10  Presumed secondary to menstrual losses   Kidney stone     Has passed several kidney stones  No prior kidney stone workup  Try to stay well hydrated  No kidney stones for several years   JEFF III (vulvar intraepithelial neoplasia III) 01/2015       Past Surgical History:   Procedure Laterality Date    APPENDECTOMY  2001    AUGMENTATION MAMMAPLASTY      2006-     BREAST IMPLANT Bilateral 2008    CHOLECYSTECTOMY  2004    FOOT SURGERY  2013    REVISION OF SCAR  02/18/2012    appendectomy scar revised    US GUIDED BREAST BIOPSY LEFT COMPLETE Left 04/2019       Social History     Tobacco Use    Smoking status: Never Smoker    Smokeless tobacco: Never Used   Substance Use Topics    Alcohol use: No    Drug use: No        Allergies   Allergen Reactions    Sulfa Antibiotics      Causes urinary symptoms    Aspirin Rash     GI upset    Penicillins Rash     GI upset         Current Outpatient Medications:     methimazole (TAPAZOLE) 5 mg tablet, Take 1 tablet by mouth every Wednesday and Sunday, Disp: 90 tablet, Rfl: 1    NON FORMULARY, Start with a pea size amount (1 full pump) to spots at night, OR directions per MD  Use e[employer] software on Musely Klaudia to guide you daily  , Disp: , Rfl:     zolpidem (AMBIEN) 10 mg tablet, Take 1 tablet (10 mg total) by mouth daily at bedtime as needed for sleep, Disp: 30 tablet, Rfl: 0    Albuterol Sulfate (PROAIR RESPICLICK) 097 (90 Base) MCG/ACT AEPB, Inhale 2 puffs every 4 (four) hours (Patient not taking: Reported on 11/5/2020), Disp: 1 each, Rfl: 1    misoprostol (CYTOTEC) 200 mcg tablet, Take 1 tablet (200 mcg total) by mouth daily for 2 days Prior to procedure, Disp: 2 tablet, Rfl: 0      Review of Systems   Constitutional: Negative  HENT: Negative  Eyes: Negative  Respiratory: Negative  Cardiovascular: Negative  Gastrointestinal: Negative  Endocrine: Negative  Genitourinary:        As noted in HPI   Musculoskeletal: Negative  Skin: Negative  Allergic/Immunologic: Negative  Neurological: Negative  Hematological: Negative  Psychiatric/Behavioral: Negative  /78 (BP Location: Right arm, Patient Position: Sitting, Cuff Size: Adult)   Temp 97 7 °F (36 5 °C) (Temporal)   Ht 5' 11" (1 803 m)   Wt 80 7 kg (178 lb)   LMP 02/09/2021 (Approximate)   BMI 24 83 kg/m²       Physical Exam  Constitutional:       Appearance: She is well-developed  Genitourinary:      Uterus normal       No vaginal discharge or bleeding  No cervical motion tenderness  Uterus is not enlarged or tender  No right or left adnexal mass present  Right adnexa not tender  Left adnexa not tender  Neck:      Thyroid: No thyromegaly  Cardiovascular:      Rate and Rhythm: Normal rate and regular rhythm  Heart sounds: Normal heart sounds  Pulmonary:      Effort: Pulmonary effort is normal       Breath sounds: Normal breath sounds  Abdominal:      General: There is no distension  Palpations: Abdomen is soft  Tenderness: There is no abdominal tenderness  Musculoskeletal:         General: No tenderness  Neurological:      Mental Status: She is alert and oriented to person, place, and time  Skin:     General: Skin is warm and dry  US pelvis complete w transvaginal  Status: Final result   PACS Images     Show images for US pelvis complete w transvaginal   Study Result    PELVIC ULTRASOUND, COMPLETE     INDICATION:  38years old    N92 0: Excessive and frequent menstruation with regular cycle      COMPARISON: None     TECHNIQUE:   Transabdominal pelvic ultrasound was performed in sagittal and transverse planes with a curvilinear transducer  Additional transvaginal imaging was performed to better evaluate the endometrium and ovaries  Imaging included volumetric   sweeps as well as traditional still imaging technique      FINDINGS:     UTERUS:  The uterus is anteverted in position, measuring 9 2 x 4 9 x 6 7 cm  There is a submucosal, heterogeneous hypoechoic myometrial nodule measuring 1 5 x 1 3 x 1 2 cm slightly indenting the endometrial canal most likely represents a fibroid  The cervix shows no suspicious abnormality      ENDOMETRIUM:    Thickened AP caliber of the endometrial stripe is heterogeneous especially anteriorly in 2 planes without any obvious nodular focus      OVARIES/ADNEXA:  Right ovary:  3 9 x 3 1 x 3 7 cm  No suspicious right ovarian abnormality  There is a 2 4 cm cyst with low-level echoes with thin reticular elements most likely related to hemorrhage /degenerating corpus luteum  Doppler flow within normal limits      Left ovary:  3 x 1 9 x 1 7 cm  No suspicious left ovarian abnormality  Doppler flow within normal limits      No suspicious adnexal mass or loculated collections  There is no free fluid      IMPRESSION:     Endometrial thickening with heterogeneity    This could be related to phase of menstruation but should be correlated with the chronicity of patient's symptoms      Submucosal nodule is most likely a fibroid      Follow-up in a few menstrual cycles may be useful to ensure resolution      The study was marked in [facility] for significant notification                     Workstation performed: [id number]

## 2021-03-10 NOTE — H&P
Assessment/Plan:     Diagnoses and all orders for this visit:    Menorrhagia with regular cycle  -     Case request operating room: DILATATION AND CURETTAGE (D&C) WITH HYSTEROSCOPY, ABLATION ENDOMETRIAL NOVASURE, MYOMECTOMY; Standing  -     Type and screen; Future  -     Basic metabolic panel; Future  -     CBC and Platelet; Future  -     Case request operating room: DILATATION AND CURETTAGE (D&C) WITH HYSTEROSCOPY, ABLATION ENDOMETRIAL NOVASURE, MYOMECTOMY  -     Tissue Exam  -     Endometrial biopsy    Fibroids, submucosal  -     Case request operating room: DILATATION AND CURETTAGE (D&C) WITH HYSTEROSCOPY, ABLATION ENDOMETRIAL NOVASURE, MYOMECTOMY; Standing  -     Type and screen; Future  -     Basic metabolic panel; Future  -     CBC and Platelet; Future  -     Case request operating room: DILATATION AND CURETTAGE (D&C) WITH HYSTEROSCOPY, ABLATION ENDOMETRIAL NOVASURE, MYOMECTOMY  -     Endometrial biopsy    Pre-procedure lab exam  -     POCT urine HCG    Other orders  -     NON FORMULARY; Start with a pea size amount (1 full pump) to spots at night, OR directions per MD  Use [employer] software on [employer] Klaud[employer] to guide you daily   -     Diet NPO; Sips with meds; Standing  -     Void on call to OR; Standing  -     Insert peripheral IV; Standing  -     Place sequential compression device; Standing  -     Shower/scrub; Standing  -     lactated ringers infusion      patient with abnormal with bleeding most likely secondary to submucosal fibroid  Discussed with patient previously treatment options including medical therapy versus endometrial ablation versus Intrauterine device versus tranexamic acid  Patient is returning to the office requesting endometrial ablation  Discussed with patient now finding of submucosal fibroid  I advised removal if feasible via hysteroscopic myomectomy    Discussed with patient that on occasion these submucosal fibroids with interfere with the endometrial ablation and so it is best to remove it at the time of endometrial ablation as well  Discussed with patient hysteroscopic myomectomy and risks including fluid overload uterine perforation as well as incomplete removal of uterine fibroid  Endometrial biopsy was performed  We will call patient with results  Endometrial biopsy was performed to rule out endometrial hyperplasia  She declines a hysterectomy at this point as well as medical therapy  Patient declines hysterectomy  Discussed that a successful result is most likely to be a reduction in the volume of uterine bleeding, and amenorrhea is not guaranteed  The most common complications associated with endometrial ablation are uterine perforation, hemorrhage, hematometra, and pelvic infection  There is a small chance that the procedure many not be completed due to technical difficulties, uterine perforation or irregular endometrial cavity as in her case of submucosal fibroids  Also discussed the potential need for additional procedures or additional surgical intervention in the future such as a hysterectomy if the procedure does not work  Discussed with patient indication, risks, benefits and alternatives of surgical exploration  We discussed possibility of bleeding requiring blood transfusion, life-threatening infections requiring additional procedures, injuries to surrounding organs such as bladder, ureters, gastrointestinal tract and/or neurovascular structures  Additionally, we discussed other general risks associated to stress of surgery including but not limited to venous thromboembolism, acute myocardial events and stroke  Patient understands exam under anesthesia will be performed as part of this procedure and that my associates, including trainees may participate/perform exam and/or portions of the procedure as deemed safe and appropriate  All questions answered to patient's satisfaction  She agrees and wants to proceed   Informed consent form signed    Preop testing, preop instructions and chlorhexidine wash given  Patient to be scheduled for May 18, 2021  Return to the office 2 weeks postoperatively  Patient to be scheduled for a hysteroscopy, D&C, hysteroscopic myomectomy (Bryan Fragoso), NovaSure endometrial ablation  Subjective   Patient ID: Jennifer Huang is a 38 y o  female  Patient is here for a problem visit  Chief Complaint   Patient presents with    Procedure     Endometrial Biopsy      Patient previously seen at about 4 months ago complaining of very heavy periods  Patient states that her periods occur every month, last 5-7 days  Patient states that her periods have worsened with regards to amount since this past year  Patient had a recent pelvic ultrasound that showed thickened endometrium and a submucosal fibroid  Patient with history of 2 prior vaginal deliveries  She is sexually active with her  and her  has had a vasectomy  Hemoglobin is 12 9 on 2020  Menstrual History:  OB History        2    Para   2    Term   2       0    AB   0    Living   2       SAB   0    TAB   0    Ectopic   0    Multiple   0    Live Births   2                  OB History    Para Term  AB Living   2 2 2 0 0 2   SAB TAB Ectopic Multiple Live Births   0 0 0 0 2      # Outcome Date GA Lbr Chao/2nd Weight Sex Delivery Anes PTL Lv   2 Term      Vag-Spont      1 Term      Vag-Spont          Menarche age: 5  Patient's last menstrual period was 2021 (approximate)  Past Medical History:   Diagnosis Date    Anemia     Disease of thyroid gland     hyperthyroid    Foot pain, left     She fractured a bone in her left foot  Had severe pain associated with redness  Was given the diagnosis of reflex sympathetic dystrophy  Has had nerve blocks which were ineffective   Galactorrhea in female 11/10/2015    Genital warts     Graves' disease 7/3/2018    TSI positive  Intolerant of TPU  Switched to methimazole 5 mg daily by endocrinologist   TSH in June of 2018 0 005 with normal T3 & T4  Followed by endocrinology at [facility] [address]   Hepatitis B 1998    Diagnosed with acute Hepatitis B  Subsequent Hep B Surface Ag negative, Surface Ab positive  HCV negative, HIV negative 2010   Hypertrophic scar 07/12/2012    Iron deficiency anemia due to chronic blood loss 7/3/2018     Hemoglobin 11 4 in June of 2018 with a ferritin of 10  Presumed secondary to menstrual losses   Kidney stone     Has passed several kidney stones  No prior kidney stone workup  Try to stay well hydrated  No kidney stones for several years   JEFF III (vulvar intraepithelial neoplasia III) 01/2015       Past Surgical History:   Procedure Laterality Date    APPENDECTOMY  2001    AUGMENTATION MAMMAPLASTY      2006-     BREAST IMPLANT Bilateral 2008    CHOLECYSTECTOMY  2004    FOOT SURGERY  2013    REVISION OF SCAR  02/18/2012    appendectomy scar revised    US GUIDED BREAST BIOPSY LEFT COMPLETE Left 04/2019       Social History     Tobacco Use    Smoking status: Never Smoker    Smokeless tobacco: Never Used   Substance Use Topics    Alcohol use: No    Drug use: No        Allergies   Allergen Reactions    Sulfa Antibiotics      Causes urinary symptoms    Aspirin Rash     GI upset    Penicillins Rash     GI upset         Current Outpatient Medications:     methimazole (TAPAZOLE) 5 mg tablet, Take 1 tablet by mouth every Wednesday and Sunday, Disp: 90 tablet, Rfl: 1    NON FORMULARY, Start with a pea size amount (1 full pump) to spots at night, OR directions per MD  Use [employer] software on Musely Klaudia to guide you daily  , Disp: , Rfl:     zolpidem (AMBIEN) 10 mg tablet, Take 1 tablet (10 mg total) by mouth daily at bedtime as needed for sleep, Disp: 30 tablet, Rfl: 0    Albuterol Sulfate (PROAIR RESPICLICK) 074 (90 Base) MCG/ACT AEPB, Inhale 2 puffs every 4 (four) hours (Patient not taking: Reported on 11/5/2020), Disp: 1 each, Rfl: 1    misoprostol (CYTOTEC) 200 mcg tablet, Take 1 tablet (200 mcg total) by mouth daily for 2 days Prior to procedure, Disp: 2 tablet, Rfl: 0      Review of Systems   Constitutional: Negative  HENT: Negative  Eyes: Negative  Respiratory: Negative  Cardiovascular: Negative  Gastrointestinal: Negative  Endocrine: Negative  Genitourinary:        As noted in HPI   Musculoskeletal: Negative  Skin: Negative  Allergic/Immunologic: Negative  Neurological: Negative  Hematological: Negative  Psychiatric/Behavioral: Negative  /78 (BP Location: Right arm, Patient Position: Sitting, Cuff Size: Adult)   Temp 97 7 °F (36 5 °C) (Temporal)   Ht 5' 11" (1 803 m)   Wt 80 7 kg (178 lb)   LMP 02/09/2021 (Approximate)   BMI 24 83 kg/m²       Physical Exam  Constitutional:       Appearance: She is well-developed  Genitourinary:      Uterus normal       No vaginal discharge or bleeding  No cervical motion tenderness  Uterus is not enlarged or tender  No right or left adnexal mass present  Right adnexa not tender  Left adnexa not tender  Neck:      Thyroid: No thyromegaly  Cardiovascular:      Rate and Rhythm: Normal rate and regular rhythm  Heart sounds: Normal heart sounds  Pulmonary:      Effort: Pulmonary effort is normal       Breath sounds: Normal breath sounds  Abdominal:      General: There is no distension  Palpations: Abdomen is soft  Tenderness: There is no abdominal tenderness  Musculoskeletal:         General: No tenderness  Neurological:      Mental Status: She is alert and oriented to person, place, and time  Skin:     General: Skin is warm and dry  US pelvis complete w transvaginal  Status: Final result   PACS Images     Show images for US pelvis complete w transvaginal   Study Result    PELVIC ULTRASOUND, COMPLETE     INDICATION:  38years old    N92 0: Excessive and frequent menstruation with regular cycle      COMPARISON: None     TECHNIQUE:   Transabdominal pelvic ultrasound was performed in sagittal and transverse planes with a curvilinear transducer  Additional transvaginal imaging was performed to better evaluate the endometrium and ovaries  Imaging included volumetric   sweeps as well as traditional still imaging technique      FINDINGS:     UTERUS:  The uterus is anteverted in position, measuring 9 2 x 4 9 x 6 7 cm  There is a submucosal, heterogeneous hypoechoic myometrial nodule measuring 1 5 x 1 3 x 1 2 cm slightly indenting the endometrial canal most likely represents a fibroid  The cervix shows no suspicious abnormality      ENDOMETRIUM:    Thickened AP caliber of the endometrial stripe is heterogeneous especially anteriorly in 2 planes without any obvious nodular focus      OVARIES/ADNEXA:  Right ovary:  3 9 x 3 1 x 3 7 cm  No suspicious right ovarian abnormality  There is a 2 4 cm cyst with low-level echoes with thin reticular elements most likely related to hemorrhage /degenerating corpus luteum  Doppler flow within normal limits      Left ovary:  3 x 1 9 x 1 7 cm  No suspicious left ovarian abnormality  Doppler flow within normal limits      No suspicious adnexal mass or loculated collections  There is no free fluid      IMPRESSION:     Endometrial thickening with heterogeneity    This could be related to phase of menstruation but should be correlated with the chronicity of patient's symptoms      Submucosal nodule is most likely a fibroid      Follow-up in a few menstrual cycles may be useful to ensure resolution      The study was marked in [facility] for significant notification                     Workstation performed: [id number]                 Endometrial biopsy    Date/Time: 3/9/2021 6:30 PM  Performed by: Nati Manning MD  Authorized by: Nati Manning MD   Universal Protocol:  Time out: Immediately prior to procedure a "time out" was called to verify the correct patient, procedure, equipment, support staff and site/side marked as required  Patient understanding: patient states understanding of the procedure being performed  Relevant documents: relevant documents present and verified  Radiology Images displayed and confirmed  If images not available, report reviewed: imaging studies available  Patient identity confirmed: verbally with patient      Indication:     Indications:  Other disorder of menstruation and other abnormal bleeding from female genital tract    Procedure:     Procedure: endometrial biopsy with Pipelle      A bivalve speculum was placed in the vagina: yes      Cervix cleaned and prepped: yes      The cervix was dilated: no      Uterus sounded: yes      Uterus sound depth (cm):  9    Curettes used:  1    Specimen collected: specimen collected and sent to pathology    Findings:     Cervix: normal

## 2021-03-10 NOTE — PROGRESS NOTES
Endometrial biopsy    Date/Time: 3/9/2021 6:30 PM  Performed by: Hussain Lyn MD  Authorized by: Hussain Lyn MD   Universal Protocol:  Time out: Immediately prior to procedure a "time out" was called to verify the correct patient, procedure, equipment, support staff and site/side marked as required  Patient understanding: patient states understanding of the procedure being performed  Relevant documents: relevant documents present and verified  Radiology Images displayed and confirmed  If images not available, report reviewed: imaging studies available  Patient identity confirmed: verbally with patient      Indication:     Indications:  Other disorder of menstruation and other abnormal bleeding from female genital tract    Procedure:     Procedure: endometrial biopsy with Pipelle      A bivalve speculum was placed in the vagina: yes      Cervix cleaned and prepped: yes      The cervix was dilated: no      Uterus sounded: yes      Uterus sound depth (cm):  9    Curettes used:  1    Specimen collected: specimen collected and sent to pathology    Findings:     Cervix: normal

## 2021-03-19 ENCOUNTER — TELEPHONE (OUTPATIENT)
Dept: OBGYN CLINIC | Facility: CLINIC | Age: 41
End: 2021-03-19

## 2021-03-19 NOTE — TELEPHONE ENCOUNTER
Spoke to Darleen loyd [facility]  She stated that the patient does not require an authorization for cpt code 76604  She did state that we need a referral from the PCP  Reference # [id number]   Will reach out to PCP office regarding referral

## 2021-04-01 DIAGNOSIS — G47.09 OTHER INSOMNIA: ICD-10-CM

## 2021-04-01 RX ORDER — ZOLPIDEM TARTRATE 10 MG/1
10 TABLET ORAL
Qty: 30 TABLET | Refills: 0 | Status: SHIPPED | OUTPATIENT
Start: 2021-04-01 | End: 2021-04-30 | Stop reason: SDUPTHER

## 2021-04-20 ENCOUNTER — TELEPHONE (OUTPATIENT)
Dept: FAMILY MEDICINE CLINIC | Facility: CLINIC | Age: 41
End: 2021-04-20

## 2021-04-20 NOTE — TELEPHONE ENCOUNTER
Patient left a voicemail inquiring about the hepatitis B vaccine and wants to know if she is able to get that based on her age and medical history      Please advise

## 2021-04-20 NOTE — TELEPHONE ENCOUNTER
[place] has already had hepatitis B and has a positive surface antibody which makes her immune to reinfection

## 2021-04-20 NOTE — TELEPHONE ENCOUNTER
Called patient and she stated that she recently got a job through [employer] and they said she still needed to get it   She wants to know if you can write something up for her job stating that she does not need to get the vaccine

## 2021-04-30 DIAGNOSIS — G47.09 OTHER INSOMNIA: ICD-10-CM

## 2021-04-30 RX ORDER — ZOLPIDEM TARTRATE 10 MG/1
10 TABLET ORAL
Qty: 30 TABLET | Refills: 0 | Status: SHIPPED | OUTPATIENT
Start: 2021-04-30 | End: 2021-06-03 | Stop reason: SDUPTHER

## 2021-04-30 NOTE — TELEPHONE ENCOUNTER
PDMP reviewed  No red flags identified; safe to proceed with prescription      PDMP Review       Value Time User    PDMP Reviewed  Yes 4/30/2021 12:30 PM Susan Barroso MD

## 2021-05-13 ENCOUNTER — TRANSCRIBE ORDERS (OUTPATIENT)
Dept: ADMINISTRATIVE | Facility: HOSPITAL | Age: 41
End: 2021-05-13

## 2021-05-13 ENCOUNTER — APPOINTMENT (OUTPATIENT)
Dept: LAB | Facility: CLINIC | Age: 41
End: 2021-05-13
Payer: COMMERCIAL

## 2021-05-13 DIAGNOSIS — N92.0 MENORRHAGIA WITH REGULAR CYCLE: ICD-10-CM

## 2021-05-13 DIAGNOSIS — N64.3 GALACTORRHEA: ICD-10-CM

## 2021-05-13 DIAGNOSIS — E05.00 GRAVES DISEASE: ICD-10-CM

## 2021-05-13 DIAGNOSIS — D25.0 FIBROIDS, SUBMUCOSAL: ICD-10-CM

## 2021-05-13 DIAGNOSIS — D50.9 IRON DEFICIENCY ANEMIA, UNSPECIFIED IRON DEFICIENCY ANEMIA TYPE: ICD-10-CM

## 2021-05-13 LAB
ABO GROUP BLD: NORMAL
ANION GAP SERPL CALCULATED.3IONS-SCNC: 4 MMOL/L (ref 4–13)
BLD GP AB SCN SERPL QL: NEGATIVE
BUN SERPL-MCNC: 15 MG/DL (ref 5–25)
CALCIUM SERPL-MCNC: 9 MG/DL (ref 8.3–10.1)
CHLORIDE SERPL-SCNC: 110 MMOL/L (ref 100–108)
CO2 SERPL-SCNC: 27 MMOL/L (ref 21–32)
CREAT SERPL-MCNC: 0.84 MG/DL (ref 0.6–1.3)
ERYTHROCYTE [DISTWIDTH] IN BLOOD BY AUTOMATED COUNT: 12.8 % (ref 11.6–15.1)
FERRITIN SERPL-MCNC: 7 NG/ML (ref 8–388)
GFR SERPL CREATININE-BSD FRML MDRD: 87 ML/MIN/1.73SQ M
GLUCOSE P FAST SERPL-MCNC: 82 MG/DL (ref 65–99)
HCT VFR BLD AUTO: 40.9 % (ref 34.8–46.1)
HGB BLD-MCNC: 12.9 G/DL (ref 11.5–15.4)
IRON SERPL-MCNC: 78 UG/DL (ref 50–170)
MCH RBC QN AUTO: 27.7 PG (ref 26.8–34.3)
MCHC RBC AUTO-ENTMCNC: 31.5 G/DL (ref 31.4–37.4)
MCV RBC AUTO: 88 FL (ref 82–98)
PLATELET # BLD AUTO: 230 THOUSANDS/UL (ref 149–390)
PMV BLD AUTO: 10.5 FL (ref 8.9–12.7)
POTASSIUM SERPL-SCNC: 4.4 MMOL/L (ref 3.5–5.3)
PROLACTIN SERPL-MCNC: 14.2 NG/ML
RBC # BLD AUTO: 4.65 MILLION/UL (ref 3.81–5.12)
RH BLD: NEGATIVE
SODIUM SERPL-SCNC: 141 MMOL/L (ref 136–145)
SPECIMEN EXPIRATION DATE: NORMAL
T3FREE SERPL-MCNC: 2.27 PG/ML (ref 2.3–4.2)
T4 FREE SERPL-MCNC: 0.91 NG/DL (ref 0.76–1.46)
TSH SERPL DL<=0.05 MIU/L-ACNC: 2.42 UIU/ML (ref 0.36–3.74)
WBC # BLD AUTO: 4.64 THOUSAND/UL (ref 4.31–10.16)

## 2021-05-13 PROCEDURE — 86850 RBC ANTIBODY SCREEN: CPT

## 2021-05-13 PROCEDURE — 36415 COLL VENOUS BLD VENIPUNCTURE: CPT

## 2021-05-13 PROCEDURE — 82728 ASSAY OF FERRITIN: CPT

## 2021-05-13 PROCEDURE — 84443 ASSAY THYROID STIM HORMONE: CPT

## 2021-05-13 PROCEDURE — 84439 ASSAY OF FREE THYROXINE: CPT

## 2021-05-13 PROCEDURE — 83540 ASSAY OF IRON: CPT

## 2021-05-13 PROCEDURE — 86901 BLOOD TYPING SEROLOGIC RH(D): CPT

## 2021-05-13 PROCEDURE — 84146 ASSAY OF PROLACTIN: CPT

## 2021-05-13 PROCEDURE — 85027 COMPLETE CBC AUTOMATED: CPT

## 2021-05-13 PROCEDURE — 86900 BLOOD TYPING SEROLOGIC ABO: CPT

## 2021-05-13 PROCEDURE — 80048 BASIC METABOLIC PNL TOTAL CA: CPT

## 2021-05-13 PROCEDURE — 84481 FREE ASSAY (FT-3): CPT

## 2021-05-14 NOTE — PRE-PROCEDURE INSTRUCTIONS
Pre-Surgery Instructions:   Medication Instructions    Albuterol Sulfate (PROAIR RESPICLICK) 997 (90 Base) MCG/ACT AEPB Instructed patient to continue taking as prescribed up to and including DOS   methimazole (TAPAZOLE) 5 mg tablet Instructed patient to continue taking as prescribed     zolpidem (AMBIEN) 10 mg tablet Instructed patient to continue taking as prescribed  Med list reviewed as above  Also instructed pt not to start any new vitamins/supplements preoperatively and to avoid NSAID's  3 days prior to surgery  Tylenol is acceptable if needed  Pt has and/or is getting CHG surgical soap and verbalizes understanding of preoperative showering protocol  Reviewed [facility] current covid visitor restriction policy and pt understands that it may change at any time  All information within "My Surgical Experience" pamphlet reviewed and patient verbalizes understanding and compliance  All questions answered

## 2021-05-16 ENCOUNTER — ANESTHESIA EVENT (OUTPATIENT)
Dept: PERIOP | Facility: HOSPITAL | Age: 41
End: 2021-05-16
Payer: COMMERCIAL

## 2021-05-17 ENCOUNTER — TELEPHONE (OUTPATIENT)
Dept: OBGYN CLINIC | Facility: CLINIC | Age: 41
End: 2021-05-17

## 2021-05-17 ENCOUNTER — OFFICE VISIT (OUTPATIENT)
Dept: FAMILY MEDICINE CLINIC | Facility: CLINIC | Age: 41
End: 2021-05-17
Payer: COMMERCIAL

## 2021-05-17 VITALS
TEMPERATURE: 97.8 F | WEIGHT: 180 LBS | DIASTOLIC BLOOD PRESSURE: 74 MMHG | HEART RATE: 98 BPM | BODY MASS INDEX: 25.2 KG/M2 | SYSTOLIC BLOOD PRESSURE: 122 MMHG | OXYGEN SATURATION: 99 % | HEIGHT: 71 IN

## 2021-05-17 DIAGNOSIS — M79.672 FOOT PAIN, LEFT: Primary | ICD-10-CM

## 2021-05-17 DIAGNOSIS — D17.1 LIPOMA OF BACK: ICD-10-CM

## 2021-05-17 DIAGNOSIS — D50.0 IRON DEFICIENCY ANEMIA DUE TO CHRONIC BLOOD LOSS: ICD-10-CM

## 2021-05-17 PROCEDURE — 3725F SCREEN DEPRESSION PERFORMED: CPT | Performed by: INTERNAL MEDICINE

## 2021-05-17 PROCEDURE — 99214 OFFICE O/P EST MOD 30 MIN: CPT | Performed by: INTERNAL MEDICINE

## 2021-05-17 RX ORDER — NORTRIPTYLINE HYDROCHLORIDE 25 MG/1
25 CAPSULE ORAL
Qty: 30 CAPSULE | Refills: 3 | Status: SHIPPED | OUTPATIENT
Start: 2021-05-17 | End: 2021-11-10

## 2021-05-17 NOTE — PROGRESS NOTES
Assessment/Plan:    Problem List Items Addressed This Visit        Other    Iron deficiency anemia due to chronic blood loss     Her ferritin is low at 7 but she is not anemic  She really has not been taking her iron consistently  She is scheduled for a D and C tomorrow  Will hold off on any further iron treatment for the time being  Foot pain, left - Primary     This is a frustrating problem for insert 1st name  She previously had been diagnosed with reflex sympathetic dystrophy  We are trying to avoid opiates in this setting  We are going to give her trial of nortriptyline 25 mg at bedtime  We may increase it slowly up to 75 mg if she does not improve on this dose  We did talk about the possibility of sedation which is why we are giving it to her at night  She was also advised that it may cause dry mouth  Relevant Medications    nortriptyline (PAMELOR) 25 mg capsule    Lipoma of back     I reassured her that this was more than likely benign but also did not think it was the cause of her back discomfort  We talked about either just observing this or referring her for resection  Were going to hold off on resection for now  BMI Counseling: Body mass index is 25 1 kg/m²  The BMI is above normal  Nutrition recommendations include encouraging healthy choices of fruits and vegetables  Chief Complaint     Follow-up; Care Gap Request          Patient ID: Charu Antoine is a 38 y o  female who returns for routine follow up  She hasn't been taking her iron pills over the past 3 months  She feels fatigued all the time  Of note, her recent thyroid tests were within normal limits  She does not sleep all night because she wakes up because of her left foot pain  She has seen multiple specialists about the foot pain and has had an injection which only provided relief for couple of days  She was intolerant of gabapentin in the past  She has left foot pain   She has a lump in her left lower back  She felt a pop in her left lower back after raking last fall  Objective:    /74 (BP Location: Right arm, Patient Position: Sitting, Cuff Size: Standard)   Pulse 98   Temp 97 8 °F (36 6 °C)   Ht 5' 11" (1 803 m)   Wt 81 6 kg (180 lb)   SpO2 99%   BMI 25 10 kg/m²     Wt Readings from Last 3 Encounters:   05/17/21 81 6 kg (180 lb)   03/09/21 80 7 kg (178 lb)   11/23/20 81 6 kg (179 lb 12 8 oz)         Physical Exam    General:  Well-developed, well-nourished, in no acute distress  Back:  There is a fleshy freely mobile subcutaneous nodule in the right sacral region  There is mild tenderness associated with it  Musculoskeletal:  She has exquisite tenderness to palpation on the plantar aspect of the left calcaneus  There is some callus overlying this but no evidence of a plantar wart  She also has similar exquisite tenderness to palpation on the plantar aspect of the 4th and 5th metatarsals    She is also very tender on the medial aspect of the 1st metatarsal   There is no evidence of erythema and swelling in the metatarsal

## 2021-05-17 NOTE — TELEPHONE ENCOUNTER
Pt scheduled for surgery with you tomorrow and would prefer to have her post-op appt in [facility]  Next available appt there is 6/3- she wants to confirm that would be ok to wait?

## 2021-05-18 ENCOUNTER — ANESTHESIA (OUTPATIENT)
Dept: PERIOP | Facility: HOSPITAL | Age: 41
End: 2021-05-18
Payer: COMMERCIAL

## 2021-05-18 ENCOUNTER — HOSPITAL ENCOUNTER (OUTPATIENT)
Facility: HOSPITAL | Age: 41
Setting detail: OUTPATIENT SURGERY
Discharge: HOME | End: 2021-05-18
Attending: OBSTETRICS & GYNECOLOGY | Admitting: OBSTETRICS & GYNECOLOGY
Payer: COMMERCIAL

## 2021-05-18 VITALS
HEART RATE: 79 BPM | HEIGHT: 71 IN | SYSTOLIC BLOOD PRESSURE: 130 MMHG | TEMPERATURE: 97.4 F | BODY MASS INDEX: 24.92 KG/M2 | DIASTOLIC BLOOD PRESSURE: 77 MMHG | WEIGHT: 178 LBS | OXYGEN SATURATION: 99 % | RESPIRATION RATE: 20 BRPM

## 2021-05-18 DIAGNOSIS — D25.0 FIBROIDS, SUBMUCOSAL: ICD-10-CM

## 2021-05-18 DIAGNOSIS — N92.0 MENORRHAGIA WITH REGULAR CYCLE: ICD-10-CM

## 2021-05-18 LAB
ABO GROUP BLD: NORMAL
EXT PREGNANCY TEST URINE: NEGATIVE
EXT. CONTROL: NORMAL
RH BLD: NEGATIVE

## 2021-05-18 PROCEDURE — 99024 POSTOP FOLLOW-UP VISIT: CPT | Performed by: OBSTETRICS & GYNECOLOGY

## 2021-05-18 PROCEDURE — 58563 HYSTEROSCOPY ABLATION: CPT | Performed by: OBSTETRICS & GYNECOLOGY

## 2021-05-18 PROCEDURE — 81025 URINE PREGNANCY TEST: CPT | Performed by: OBSTETRICS & GYNECOLOGY

## 2021-05-18 PROCEDURE — 88305 TISSUE EXAM BY PATHOLOGIST: CPT | Performed by: PATHOLOGY

## 2021-05-18 RX ORDER — ONDANSETRON 2 MG/ML
4 INJECTION INTRAMUSCULAR; INTRAVENOUS ONCE AS NEEDED
Status: DISCONTINUED | OUTPATIENT
Start: 2021-05-18 | End: 2021-05-18 | Stop reason: HOSPADM

## 2021-05-18 RX ORDER — OXYCODONE HYDROCHLORIDE 5 MG/1
5 TABLET ORAL EVERY 4 HOURS PRN
Status: DISCONTINUED | OUTPATIENT
Start: 2021-05-18 | End: 2021-05-18 | Stop reason: HOSPADM

## 2021-05-18 RX ORDER — FENTANYL CITRATE 50 UG/ML
INJECTION, SOLUTION INTRAMUSCULAR; INTRAVENOUS AS NEEDED
Status: DISCONTINUED | OUTPATIENT
Start: 2021-05-18 | End: 2021-05-18

## 2021-05-18 RX ORDER — MIDAZOLAM HYDROCHLORIDE 2 MG/2ML
INJECTION, SOLUTION INTRAMUSCULAR; INTRAVENOUS AS NEEDED
Status: DISCONTINUED | OUTPATIENT
Start: 2021-05-18 | End: 2021-05-18

## 2021-05-18 RX ORDER — FENTANYL CITRATE/PF 50 MCG/ML
25 SYRINGE (ML) INJECTION
Status: DISCONTINUED | OUTPATIENT
Start: 2021-05-18 | End: 2021-05-18 | Stop reason: HOSPADM

## 2021-05-18 RX ORDER — IBUPROFEN 600 MG/1
600 TABLET ORAL EVERY 6 HOURS PRN
Status: DISCONTINUED | OUTPATIENT
Start: 2021-05-18 | End: 2021-05-18 | Stop reason: HOSPADM

## 2021-05-18 RX ORDER — LIDOCAINE HYDROCHLORIDE 20 MG/ML
INJECTION, SOLUTION EPIDURAL; INFILTRATION; INTRACAUDAL; PERINEURAL AS NEEDED
Status: DISCONTINUED | OUTPATIENT
Start: 2021-05-18 | End: 2021-05-18

## 2021-05-18 RX ORDER — SODIUM CHLORIDE 9 MG/ML
125 INJECTION, SOLUTION INTRAVENOUS CONTINUOUS
Status: DISCONTINUED | OUTPATIENT
Start: 2021-05-18 | End: 2021-05-18 | Stop reason: HOSPADM

## 2021-05-18 RX ORDER — KETOROLAC TROMETHAMINE 30 MG/ML
INJECTION, SOLUTION INTRAMUSCULAR; INTRAVENOUS AS NEEDED
Status: DISCONTINUED | OUTPATIENT
Start: 2021-05-18 | End: 2021-05-18

## 2021-05-18 RX ORDER — PROPOFOL 10 MG/ML
INJECTION, EMULSION INTRAVENOUS AS NEEDED
Status: DISCONTINUED | OUTPATIENT
Start: 2021-05-18 | End: 2021-05-18

## 2021-05-18 RX ORDER — ONDANSETRON 2 MG/ML
INJECTION INTRAMUSCULAR; INTRAVENOUS AS NEEDED
Status: DISCONTINUED | OUTPATIENT
Start: 2021-05-18 | End: 2021-05-18

## 2021-05-18 RX ORDER — SODIUM CHLORIDE, SODIUM LACTATE, POTASSIUM CHLORIDE, CALCIUM CHLORIDE 600; 310; 30; 20 MG/100ML; MG/100ML; MG/100ML; MG/100ML
125 INJECTION, SOLUTION INTRAVENOUS CONTINUOUS
Status: DISCONTINUED | OUTPATIENT
Start: 2021-05-18 | End: 2021-05-18 | Stop reason: HOSPADM

## 2021-05-18 RX ORDER — ONDANSETRON 2 MG/ML
4 INJECTION INTRAMUSCULAR; INTRAVENOUS EVERY 6 HOURS PRN
Status: DISCONTINUED | OUTPATIENT
Start: 2021-05-18 | End: 2021-05-18 | Stop reason: HOSPADM

## 2021-05-18 RX ORDER — ONDANSETRON 2 MG/ML
4 INJECTION INTRAMUSCULAR; INTRAVENOUS EVERY 4 HOURS PRN
Status: DISCONTINUED | OUTPATIENT
Start: 2021-05-18 | End: 2021-05-18 | Stop reason: HOSPADM

## 2021-05-18 RX ADMIN — MIDAZOLAM 2 MG: 1 INJECTION INTRAMUSCULAR; INTRAVENOUS at 12:03

## 2021-05-18 RX ADMIN — ONDANSETRON 4 MG: 2 INJECTION INTRAMUSCULAR; INTRAVENOUS at 12:42

## 2021-05-18 RX ADMIN — PROPOFOL 200 MG: 10 INJECTION, EMULSION INTRAVENOUS at 12:12

## 2021-05-18 RX ADMIN — KETOROLAC TROMETHAMINE 15 MG: 30 INJECTION, SOLUTION INTRAMUSCULAR at 12:42

## 2021-05-18 RX ADMIN — IBUPROFEN 600 MG: 600 TABLET, FILM COATED ORAL at 14:06

## 2021-05-18 RX ADMIN — FENTANYL CITRATE 25 MCG: 50 INJECTION INTRAMUSCULAR; INTRAVENOUS at 12:21

## 2021-05-18 RX ADMIN — LIDOCAINE HYDROCHLORIDE 80 MG: 20 INJECTION, SOLUTION EPIDURAL; INFILTRATION; INTRACAUDAL; PERINEURAL at 12:11

## 2021-05-18 RX ADMIN — SODIUM CHLORIDE 125 ML/HR: 0.9 INJECTION, SOLUTION INTRAVENOUS at 10:50

## 2021-05-18 RX ADMIN — SODIUM CHLORIDE 125 ML/HR: 0.9 INJECTION, SOLUTION INTRAVENOUS at 13:18

## 2021-05-18 NOTE — DISCHARGE INSTRUCTIONS
Endometrial Ablation   WHAT YOU SHOULD KNOW:   Endometrial ablation (EA) is a procedure to destroy the endometrium (lining of your uterus)  You may need EA if you have heavy or abnormal vaginal bleeding  AFTER YOU LEAVE:   Medicines:   · Medicines  can help decrease pain, calm your stomach, and control vomiting  · Take your medicine as directed  Call your healthcare provider if you think your medicine is not helping or if you have side effects  Tell him if you are allergic to any medicine  Keep a list of the medicines, vitamins, and herbs you take  Include the amounts, and when and why you take them  Bring the list or the pill bottles to follow-up visits  Carry your medicine list with you in case of an emergency  What to expect after your procedure:   · Cramps, similar to menstrual cramps, for 1 to 2 days    · Watery, bloody discharge for 2 to 3 days that may become light and last a few weeks    · Frequent urination for 24 hours    · Nausea  Activity:  Ask when you can return to your normal activities  You may need to avoid sex for 6 weeks after your procedure  Birth control: You may need to use birth control after your procedure to prevent pregnancy  Pregnancy risks, such as a miscarriage, are higher after EA  Talk to your healthcare provider about birth control and having children after EA  Follow up with your healthcare provider as directed:  Write down your questions so you remember to ask them during your visits  Contact your healthcare provider if:   · The bleeding during your monthly periods has not decreased  · You have pain when you urinate  · You have questions or concerns about your condition or care  Seek care immediately or call 911 if:   · You feel lightheaded, short of breath, and have chest pain  · You cough up blood  · Your arm or leg feels warm, tender, and painful  It may look swollen and red      · You have vaginal bleeding and it is not time for your monthly period  · You have a fever  · You feel dizzy, weak, and confused  · You cannot stop vomiting  · You have severe pain  © 2014 [employer] is for End User's use only and may not be sold, redistributed or otherwise used for commercial purposes  All illustrations and images included in CareNotes® are the copyrighted property of A [employer]  or Harry Schultz  The above information is an  only  It is not intended as medical advice for individual conditions or treatments  Talk to your doctor, nurse or pharmacist before following any medical regimen to see if it is safe and effective for you

## 2021-05-18 NOTE — ASSESSMENT & PLAN NOTE
Her ferritin is low at 7 but she is not anemic  She really has not been taking her iron consistently  She is scheduled for a D and C tomorrow  Will hold off on any further iron treatment for the time being

## 2021-05-18 NOTE — ASSESSMENT & PLAN NOTE
This is a frustrating problem for insert 1st name  She previously had been diagnosed with reflex sympathetic dystrophy  We are trying to avoid opiates in this setting  We are going to give her trial of nortriptyline 25 mg at bedtime  We may increase it slowly up to 75 mg if she does not improve on this dose  We did talk about the possibility of sedation which is why we are giving it to her at night  She was also advised that it may cause dry mouth

## 2021-05-18 NOTE — OP NOTE
OPERATIVE REPORT  PATIENT NAME: Rashaad Gómez    :  1980  MRN: [id number]  Pt Location: [facility] OR ROOM 02    SURGERY DATE: 2021    Surgeon(s) and Role:     * Moira Méndez MD - Primary     * Topher Hammonds DO - Assisting    Preop Diagnosis:  Menorrhagia with regular cycle [N92 0]  Fibroids, submucosal [D25 0]    Post-Op Diagnosis Codes:     * Menorrhagia with regular cycle [N92 0]     * Fibroids, submucosal [D25 0]    Procedure(s) (LRB):  D&C W/ HYSTEROSCOPY (N/A)  ABLATION ENDOMETRIAL NOVASURE (N/A)    Specimen(s):  ID Type Source Tests Collected by Time Destination   1 : [facility] [place]AM Moira Méndez MD 2021 1239        Estimated Blood Loss:   Minimal    Drains:  * No LDAs found *    Anesthesia Type:   General    Operative Indications:  Menorrhagia with regular cycle [N92 0]  Fibroids, submucosal [D25 0]    Operative Findings:  Grossly normal external genitalia  Parous cervix  Mildly proliferative endometrium  No polyp or subserosal fibroid visualized    Complications:   None    Procedure and Technique:    Brief History    All risks, benefits, and alternatives to the procedure were discussed with the patient and she had the opportunity to ask questions  Informed consent was obtained  Description of Procedure    Patient was taken to the operating room were a time out was performed to confirm correct patient and correct procedure  General LMA anesthesia (LMA) was administered and the patient was positioned on the OR table in the dorsal lithotomy position  All pressure points were padded and a steve hugger was placed to maintain control of core body temperature  A bimanual exam was performed and the uterus was noted to be anteverted, normal in size and consistency with no palpable adnexal masses or fullness  The patient was prepped and draped in the usual sterile fashion      Operative Technique    A straight catheter was introduced into the bladder, which was drained of 50 cc of clear yellow urine  A weighted speculum was inserted into the vagina and a Fran retractor was used to visualize the anterior lip of the cervix, which was then grasped with a single toothed tenaculum  The uterus was sounded to 9 cm  Uterine cavity length was determined to be 6 5 cm, and the cervix was found to be 2 5 cm  The cervix was serially dilated to 18French using Cohen dilators for introduction of the hysteroscope  Hysteroscope was introduced under direct visualization using normal saline solution as the distention media  Hysteroscope was advanced to the uterine fundus and the entire uterine cavity was inspected in a systematic manner  There was noted to be a mildly proliferative endometrium, without polyp or subserosal fibroid  Hysteroscope was withdrawn and sharp curetting was performed, starting at the 12'oclock position and rotating a total of 360 degrees to cover all surfaces  Endometrial tissue was obtained and sent for pathology  Novasure device was inserted through the cervix and advanced to the uterine fundus  Device was deployed and rotated in all directions to maximize expansion of the bipolar electrode and to determine a uterine cavity width of 3 0 cm  A test of the system was performed and the uterine cavity was insufflated with CO2 to ensure cavity integrity and proper placement of the device  No leakage of gas was appreciated  After successful testing, the Novasure system was activated and bipolar cauterization with a Moisture Transport Vacuum System facilitated ablation of the endometrium in approximately 41 seconds under 107 browning of power  The Novasure system was completely retracted prior to it's removal from the uterine cavity  Inspection of the bipolar electrode showed evidence of burnt endometrial tissue  Fluid deficit was found to be 265 mL  The hysteroscope was then reinserted under direct visualization   Endometrial cavity showed blanching white endometrial tissue on both anterior and posterior walls of the uterus  Ablation was felt to be successful based on this evidence and the hysteroscope was withdrawn  On withdrawal of the hysteroscope from the uterine cavity, there was note of transition from white ablated endometrium to pink normal cevical mucosa  Following endometrial ablation, the single toothed tenaculum was removed from the anterior lip of the cervix  Good hemostasis was confirmed at the tenaculum puncture sites  Weighted speculum was then removed from the vagina  At the conclusion of the procedure, all needle, sponge, and instrument counts were noted to be correct x2  Patient tolerated the procedure well and was transferred to PACU in stable condition prior to discharge with follow up in 1-2 weeks  Dr Reyna Wen was present and participated in all key portions of the case      Patient Disposition:  PACU     SIGNATURE: Morena Llanes DO  DATE: May 18, 2021  TIME: 12:49 PM

## 2021-05-18 NOTE — H&P
Assessment/Plan:     Diagnoses and all orders for this visit:    Menorrhagia with regular cycle  -     Case request operating room: DILATATION AND CURETTAGE (D&C) WITH HYSTEROSCOPY, ABLATION ENDOMETRIAL NOVASURE, MYOMECTOMY; Standing  -     Type and screen; Future  -     Basic metabolic panel; Future  -     CBC and Platelet; Future  -     Case request operating room: DILATATION AND CURETTAGE (D&C) WITH HYSTEROSCOPY, ABLATION ENDOMETRIAL NOVASURE, MYOMECTOMY  -     Tissue Exam  -     Endometrial biopsy    Fibroids, submucosal  -     Case request operating room: DILATATION AND CURETTAGE (D&C) WITH HYSTEROSCOPY, ABLATION ENDOMETRIAL NOVASURE, MYOMECTOMY; Standing  -     Type and screen; Future  -     Basic metabolic panel; Future  -     CBC and Platelet; Future  -     Case request operating room: DILATATION AND CURETTAGE (D&C) WITH HYSTEROSCOPY, ABLATION ENDOMETRIAL NOVASURE, MYOMECTOMY  -     Endometrial biopsy    Pre-procedure lab exam  -     POCT urine HCG    Other orders  -     NON FORMULARY; Start with a pea size amount (1 full pump) to spots at night, OR directions per MD  Use eNLoopster software on Musely Klaudia to guide you daily   -     Diet NPO; Sips with meds; Standing  -     Void on call to OR; Standing  -     Insert peripheral IV; Standing  -     Place sequential compression device; Standing  -     Shower/scrub; Standing  -     lactated ringers infusion      Patient presents for surgery  Patient with abnormal with bleeding most likely secondary to submucosal fibroid  Discussed with patient previously treatment options including medical therapy versus endometrial ablation versus Intrauterine device versus tranexamic acid  Patient is returning to the office requesting endometrial ablation  Discussed with patient now finding of submucosal fibroid  I advised removal if feasible via hysteroscopic myomectomy    Discussed with patient that on occasion these submucosal fibroids with interfere with the endometrial ablation and so it is best to remove it at the time of endometrial ablation as well  Discussed with patient hysteroscopic myomectomy and risks including fluid overload uterine perforation as well as incomplete removal of uterine fibroid  Endometrial biopsy was performed  We will call patient with results  Endometrial biopsy was performed to rule out endometrial hyperplasia  She declines a hysterectomy at this point as well as medical therapy  Patient declines hysterectomy  Discussed that a successful result is most likely to be a reduction in the volume of uterine bleeding, and amenorrhea is not guaranteed  The most common complications associated with endometrial ablation are uterine perforation, hemorrhage, hematometra, and pelvic infection  There is a small chance that the procedure many not be completed due to technical difficulties, uterine perforation or irregular endometrial cavity as in her case of submucosal fibroids  Also discussed the potential need for additional procedures or additional surgical intervention in the future such as a hysterectomy if the procedure does not work  Discussed with patient indication, risks, benefits and alternatives of surgical exploration  We discussed possibility of bleeding requiring blood transfusion, life-threatening infections requiring additional procedures, injuries to surrounding organs such as bladder, ureters, gastrointestinal tract and/or neurovascular structures  Additionally, we discussed other general risks associated to stress of surgery including but not limited to venous thromboembolism, acute myocardial events and stroke  Patient understands exam under anesthesia will be performed as part of this procedure and that my associates, including trainees may participate/perform exam and/or portions of the procedure as deemed safe and appropriate  All questions answered to patient's satisfaction  She agrees and wants to proceed   Informed consent form signed    Preop testing, preop instructions and chlorhexidine wash given  Patient to be scheduled for May 18, 2021  Return to the office 2 weeks postoperatively  Patient to be scheduled for a hysteroscopy, D&C, hysteroscopic myomectomy (Jacque Melara), NovaSure endometrial ablation  Subjective   Patient ID: Jessica December is a 38 y o  female  Patient is here for a problem visit  Chief Complaint   Patient presents with    Procedure     Endometrial Biopsy      Patient previously seen several months ago complaining of very heavy periods  Patient states that her periods occur every month, last 5-7 days  Patient states that her periods have worsened with regards to amount since this past year  Patient had a recent pelvic ultrasound that showed thickened endometrium and a submucosal fibroid  Patient with history of 2 prior vaginal deliveries  She is sexually active with her  and her  has had a vasectomy  Hemoglobin is 12 9 on 2020  She is requesting surgical treatment with endometrial ablation and declines medical therapy  Menstrual History:  OB History        2    Para   2    Term   2       0    AB   0    Living   2       SAB   0    TAB   0    Ectopic   0    Multiple   0    Live Births   2                  OB History    Para Term  AB Living   2 2 2 0 0 2   SAB TAB Ectopic Multiple Live Births   0 0 0 0 2      # Outcome Date GA Lbr Chao/2nd Weight Sex Delivery Anes PTL Lv   2 Term      Vag-Spont      1 Term      Vag-Spont          Menarche age: 5  Patient's last menstrual period was 2021 (approximate)  Past Medical History:   Diagnosis Date    Anemia     Disease of thyroid gland     hyperthyroid    Foot pain, left     She fractured a bone in her left foot  Had severe pain associated with redness  Was given the diagnosis of reflex sympathetic dystrophy  Has had nerve blocks which were ineffective        Galactorrhea in female 11/10/2015    Genital warts     Graves' disease 7/3/2018    TSI positive  Intolerant of TPU  Switched to methimazole 5 mg daily by endocrinologist   TSH in June of 2018 0 005 with normal T3 & T4  Followed by endocrinology at [address] [place] [address] Fred Grullon Phlegm   Hepatitis B 1998    Diagnosed with acute Hepatitis B  Subsequent Hep B Surface Ag negative, Surface Ab positive  HCV negative, HIV negative 2010   Hypertrophic scar 07/12/2012    Iron deficiency anemia due to chronic blood loss 7/3/2018     Hemoglobin 11 4 in June of 2018 with a ferritin of 10  Presumed secondary to menstrual losses   Kidney stone     Has passed several kidney stones  No prior kidney stone workup  Try to stay well hydrated  No kidney stones for several years   JEFF III (vulvar intraepithelial neoplasia III) 01/2015       Past Surgical History:   Procedure Laterality Date    APPENDECTOMY  2001    AUGMENTATION MAMMAPLASTY      2006-     BREAST IMPLANT Bilateral 2008    CHOLECYSTECTOMY  2004    FOOT SURGERY  2013    REVISION OF SCAR  02/18/2012    appendectomy scar revised    US GUIDED BREAST BIOPSY LEFT COMPLETE Left 04/2019       Social History     Tobacco Use    Smoking status: Never Smoker    Smokeless tobacco: Never Used   Substance Use Topics    Alcohol use: No    Drug use: No        Allergies   Allergen Reactions    Sulfa Antibiotics      Causes urinary symptoms    Aspirin Rash     GI upset    Penicillins Rash     GI upset         Current Outpatient Medications:     methimazole (TAPAZOLE) 5 mg tablet, Take 1 tablet by mouth every Wednesday and Sunday, Disp: 90 tablet, Rfl: 1    NON FORMULARY, Start with a pea size amount (1 full pump) to spots at night, OR directions per MD  Use eNurse software on Musely Klaudia to guide you daily  , Disp: , Rfl:     zolpidem (AMBIEN) 10 mg tablet, Take 1 tablet (10 mg total) by mouth daily at bedtime as needed for sleep, Disp: 30 tablet, Rfl: 0    Albuterol Sulfate (PROAIR RESPICLICK) 038 (90 Base) MCG/ACT AEPB, Inhale 2 puffs every 4 (four) hours (Patient not taking: Reported on 11/5/2020), Disp: 1 each, Rfl: 1    misoprostol (CYTOTEC) 200 mcg tablet, Take 1 tablet (200 mcg total) by mouth daily for 2 days Prior to procedure, Disp: 2 tablet, Rfl: 0      Review of Systems   Constitutional: Negative  HENT: Negative  Eyes: Negative  Respiratory: Negative  Cardiovascular: Negative  Gastrointestinal: Negative  Endocrine: Negative  Genitourinary:        As noted in HPI   Musculoskeletal: Negative  Skin: Negative  Allergic/Immunologic: Negative  Neurological: Negative  Hematological: Negative  Psychiatric/Behavioral: Negative  /78 (BP Location: Right arm, Patient Position: Sitting, Cuff Size: Adult)   Temp 97 7 °F (36 5 °C) (Temporal)   Ht 5' 11" (1 803 m)   Wt 80 7 kg (178 lb)   LMP 02/09/2021 (Approximate)   BMI 24 83 kg/m²       Physical Exam  Constitutional:       Appearance: She is well-developed  Genitourinary:      Uterus normal       No vaginal discharge or bleeding  No cervical motion tenderness  Uterus is not enlarged or tender  No right or left adnexal mass present  Right adnexa not tender  Left adnexa not tender  Neck:      Thyroid: No thyromegaly  Cardiovascular:      Rate and Rhythm: Normal rate and regular rhythm  Heart sounds: Normal heart sounds  Pulmonary:      Effort: Pulmonary effort is normal       Breath sounds: Normal breath sounds  Abdominal:      General: There is no distension  Palpations: Abdomen is soft  Tenderness: There is no abdominal tenderness  Musculoskeletal:         General: No tenderness  Neurological:      Mental Status: She is alert and oriented to person, place, and time  Skin:     General: Skin is warm and dry             US pelvis complete w transvaginal  Status: Final result   PACS Images     Show images for US pelvis complete w transvaginal   Study Result    PELVIC ULTRASOUND, COMPLETE     INDICATION:  38years old  N92 0: Excessive and frequent menstruation with regular cycle      COMPARISON: None     TECHNIQUE:   Transabdominal pelvic ultrasound was performed in sagittal and transverse planes with a curvilinear transducer  Additional transvaginal imaging was performed to better evaluate the endometrium and ovaries  Imaging included volumetric   sweeps as well as traditional still imaging technique      FINDINGS:     UTERUS:  The uterus is anteverted in position, measuring 9 2 x 4 9 x 6 7 cm  There is a submucosal, heterogeneous hypoechoic myometrial nodule measuring 1 5 x 1 3 x 1 2 cm slightly indenting the endometrial canal most likely represents a fibroid  The cervix shows no suspicious abnormality      ENDOMETRIUM:    Thickened AP caliber of the endometrial stripe is heterogeneous especially anteriorly in 2 planes without any obvious nodular focus      OVARIES/ADNEXA:  Right ovary:  3 9 x 3 1 x 3 7 cm  No suspicious right ovarian abnormality  There is a 2 4 cm cyst with low-level echoes with thin reticular elements most likely related to hemorrhage /degenerating corpus luteum  Doppler flow within normal limits      Left ovary:  3 x 1 9 x 1 7 cm  No suspicious left ovarian abnormality  Doppler flow within normal limits      No suspicious adnexal mass or loculated collections  There is no free fluid      IMPRESSION:     Endometrial thickening with heterogeneity    This could be related to phase of menstruation but should be correlated with the chronicity of patient's symptoms      Submucosal nodule is most likely a fibroid      Follow-up in a few menstrual cycles may be useful to ensure resolution      The study was marked in [facility] for significant notification                     Workstation performed: [id number]                 Endometrial biopsy    Date/Time: 3/9/2021 6:30 PM  Performed by: Manfred Stanley MD  Authorized by: Gonzales Pringle MD   Universal Protocol:  Time out: Immediately prior to procedure a "time out" was called to verify the correct patient, procedure, equipment, support staff and site/side marked as required  Patient understanding: patient states understanding of the procedure being performed  Relevant documents: relevant documents present and verified  Radiology Images displayed and confirmed  If images not available, report reviewed: imaging studies available  Patient identity confirmed: verbally with patient      Indication:     Indications:  Other disorder of menstruation and other abnormal bleeding from female genital tract    Procedure:     Procedure: endometrial biopsy with Pipelle      A bivalve speculum was placed in the vagina: yes      Cervix cleaned and prepped: yes      The cervix was dilated: no      Uterus sounded: yes      Uterus sound depth (cm):  9    Curettes used:  1    Specimen collected: specimen collected and sent to pathology    Findings:     Cervix: normal

## 2021-05-18 NOTE — ANESTHESIA POSTPROCEDURE EVALUATION
Post-Op Assessment Note    CV Status:  Stable  Pain Score: 2    Pain management: adequate     Mental Status:  Alert and awake   Hydration Status:  Euvolemic   PONV Controlled:  Controlled   Airway Patency:  Patent      Post Op Vitals Reviewed: Yes      Staff: Anesthesiologist         No complications documented      /77 (05/18/21 1420)    Temp (!) 97 4 °F (36 3 °C) (05/18/21 1420)    Pulse 79 (05/18/21 1420)   Resp 20 (05/18/21 1420)    SpO2 99 % (05/18/21 1420)

## 2021-05-18 NOTE — ASSESSMENT & PLAN NOTE
I reassured her that this was more than likely benign but also did not think it was the cause of her back discomfort  We talked about either just observing this or referring her for resection  Were going to hold off on resection for now

## 2021-05-18 NOTE — ANESTHESIA PREPROCEDURE EVALUATION
Procedure:  D&C W/ HYSTEROSCOPY (N/A Uterus)  ABLATION ENDOMETRIAL NOVASURE (N/A Uterus)  MYOMECTOMY (N/A Uterus)    Relevant Problems   /RENAL   (+) Kidney stones      HEMATOLOGY   (+) Iron deficiency anemia due to chronic blood loss        Physical Exam    Airway    Mallampati score: I  TM Distance: >3 FB  Neck ROM: full     Dental       Cardiovascular  Rhythm: regular, Rate: normal, Cardiovascular exam normal    Pulmonary  Pulmonary exam normal Breath sounds clear to auscultation,     Other Findings        Anesthesia Plan  ASA Score- 2     Anesthesia Type- general with ASA Monitors  Additional Monitors:   Airway Plan: ETT  Plan Factors-    Chart reviewed  Existing labs reviewed  Patient summary reviewed  Patient is not a current smoker  Patient instructed to abstain from smoking on day of procedure  Induction- intravenous  Postoperative Plan-     Informed Consent- Anesthetic plan and risks discussed with patient and spouse (Amina Clubs)

## 2021-06-03 ENCOUNTER — OFFICE VISIT (OUTPATIENT)
Dept: OBGYN CLINIC | Facility: CLINIC | Age: 41
End: 2021-06-03
Payer: COMMERCIAL

## 2021-06-03 VITALS
DIASTOLIC BLOOD PRESSURE: 78 MMHG | BODY MASS INDEX: 25.23 KG/M2 | HEIGHT: 71 IN | SYSTOLIC BLOOD PRESSURE: 118 MMHG | HEART RATE: 95 BPM | WEIGHT: 180.2 LBS

## 2021-06-03 DIAGNOSIS — G47.09 OTHER INSOMNIA: ICD-10-CM

## 2021-06-03 DIAGNOSIS — Z98.890 S/P ENDOMETRIAL ABLATION: ICD-10-CM

## 2021-06-03 DIAGNOSIS — N92.0 MENORRHAGIA WITH REGULAR CYCLE: Primary | ICD-10-CM

## 2021-06-03 DIAGNOSIS — N89.8 VAGINAL ODOR: ICD-10-CM

## 2021-06-03 LAB
BV WHIFF TEST VAG QL: NORMAL
CLUE CELLS SPEC QL WET PREP: NORMAL
PH SMN: 4.5 [PH]
T VAGINALIS VAG QL WET PREP: NORMAL
YEAST VAG QL WET PREP: NORMAL

## 2021-06-03 PROCEDURE — 1036F TOBACCO NON-USER: CPT | Performed by: OBSTETRICS & GYNECOLOGY

## 2021-06-03 PROCEDURE — 99213 OFFICE O/P EST LOW 20 MIN: CPT | Performed by: OBSTETRICS & GYNECOLOGY

## 2021-06-03 PROCEDURE — 87210 SMEAR WET MOUNT SALINE/INK: CPT | Performed by: OBSTETRICS & GYNECOLOGY

## 2021-06-03 PROCEDURE — 3008F BODY MASS INDEX DOCD: CPT | Performed by: OBSTETRICS & GYNECOLOGY

## 2021-06-03 RX ORDER — DIPHENOXYLATE HYDROCHLORIDE AND ATROPINE SULFATE 2.5; .025 MG/1; MG/1
1 TABLET ORAL DAILY
COMMUNITY

## 2021-06-03 NOTE — PROGRESS NOTES
Assessment/Plan:     Diagnoses and all orders for this visit:    Menorrhagia with regular cycle    S/P endometrial ablation    Vaginal odor  -     POCT wet mount    Other orders  -     multivitamin (THERAGRAN) TABS; Take 1 tablet by mouth daily  -     Ferrous Sulfate (IRON PO); Take by mouth              Patients may experience irregular bleeding after endometrial ablation  The success of the procedure cannot be determined until about 8-12 weeks postoperatively  Most patients with successful ablation will have reduction in menstrual blood flow but not amenorrhea  No signs of vaginal infection on exam - advised discharge normal after ablation for several weeks, advised to call if with persistent bleeding over 8 weeks    Follow-up in [place] for annual gyn exam  Continue menstrual tracking  Subjective   Patient ID: Sabra Bray is a 38 y o  female  Patient is here for a follow-up  Chief Complaint   Patient presents with    Post-Op     HSC/D&C 2021 C/O light "brown" bleeding, LMP: 2021       She has had light brown bleeding since surgery  She is 2 weeks post-op  She does have some vaginal odor  She has no itching  She has no fevers/severe pain  She has been sexually active  Menstrual History:  OB History        2    Para   2    Term   2       0    AB   0    Living   2       SAB   0    TAB   0    Ectopic   0    Multiple   0    Live Births   2                  Patient's last menstrual period was 2021  Past Medical History:   Diagnosis Date    Anemia     Disease of thyroid gland     hyperthyroid    Foot pain, left     She fractured a bone in her left foot  Had severe pain associated with redness  Was given the diagnosis of reflex sympathetic dystrophy  Has had nerve blocks which were ineffective   Galactorrhea in female 11/10/2015    Genital warts     Graves' disease 7/3/2018    TSI positive  Intolerant of TPU    Switched to methimazole 5 mg daily by endocrinologist   TSH in June of 2018 0 005 with normal T3 & T4  Followed [facility] Dr Berkley Katz   Hepatitis B 1998    Diagnosed with acute Hepatitis B  Subsequent Hep B Surface Ag negative, Surface Ab positive  HCV negative, HIV negative 2010   History of breast lump     Hypertrophic scar 07/12/2012    Insomnia     Iron deficiency anemia due to chronic blood loss 7/3/2018     Hemoglobin 11 4 in June of 2018 with a ferritin of 10  Presumed secondary to menstrual losses   Kidney stone     Has passed several kidney stones  No prior kidney stone workup  Try to stay well hydrated  No kidney stones for several years      Menorrhagia     Uterine fibroid     JEFF III (vulvar intraepithelial neoplasia III) 01/2015    Wears glasses        Social History     Tobacco Use    Smoking status: Never Smoker    Smokeless tobacco: Never Used   Substance Use Topics    Alcohol use: Not Currently     Frequency: Monthly or less     Drinks per session: 1 or 2     Binge frequency: Never     Comment: rare, few times a year    Drug use: No     Comment: 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,describes self as recovering addict       Allergies   Allergen Reactions    Sulfa Antibiotics Other (See Comments)     Worsens UTI symptoms    Aspirin Rash and GI Intolerance    Penicillins Rash and GI Intolerance         Current Outpatient Medications:     Ferrous Sulfate (IRON PO), Take by mouth, Disp: , Rfl:     methimazole (TAPAZOLE) 5 mg tablet, Take 1 tablet by mouth every Wednesday and Sunday, Disp: 90 tablet, Rfl: 1    multivitamin (THERAGRAN) TABS, Take 1 tablet by mouth daily, Disp: , Rfl:     zolpidem (AMBIEN) 10 mg tablet, Take 1 tablet (10 mg total) by mouth daily at bedtime as needed for sleep, Disp: 30 tablet, Rfl: 0    nortriptyline (PAMELOR) 25 mg capsule, Take 1 capsule (25 mg total) by mouth daily at bedtime (Patient not taking: Reported on 6/3/2021), Disp: 30 capsule, Rfl: 3      Review of Systems   Constitutional: Negative  HENT: Negative  Eyes: Negative  Respiratory: Negative  Cardiovascular: Negative  Gastrointestinal: Negative  Endocrine: Negative  Genitourinary:        As noted in HPI   Musculoskeletal: Negative  Skin: Negative  Allergic/Immunologic: Negative  Neurological: Negative  Hematological: Negative  Psychiatric/Behavioral: Negative  /78 (BP Location: Left arm, Patient Position: Sitting, Cuff Size: Standard)   Pulse 95   Ht 5' 11" (1 803 m)   Wt 81 7 kg (180 lb 3 2 oz)   LMP 05/16/2021   BMI 25 13 kg/m²       Physical Exam  Constitutional:       General: She is not in acute distress  Appearance: She is well-developed  Genitourinary:      Pelvic exam was performed with patient in the lithotomy position  Inguinal canal, urethra, bladder, cervix, uterus, right adnexa and left adnexa normal       No vulval lesion, tenderness, ulcerations, Bartholin's cyst or rash noted  No signs of labial injury  No posterior fourchette tenderness, injury, rash or lesion present  No signs of injury or lesions in the vagina  No vaginal discharge, erythema, tenderness, bleeding, atrophy or prolapse  No cervical polyp  Uterus is not enlarged or tender  No uterine mass detected  Uterus is regular  No right or left adnexal mass present  Right adnexa not tender or full  Left adnexa not tender or full  Abdominal:      General: There is no distension  Palpations: Abdomen is soft  Tenderness: There is no abdominal tenderness  Neurological:      Mental Status: She is alert and oriented to person, place, and time  Skin:     General: Skin is warm and dry  Psychiatric:         Behavior: Behavior normal                    Counseling / Coordination of Care  Total time spent today20 minutes  minutes    Greater than 50% of total time was spent with the patient and / or family counseling and / or coordination of care  Tissue Exam: [id number]  Order: [id number]  Collected:  5/18/2021 12:39 PM Status:  Final result   Visible to patient:  Yes (Madeleine Kapoor) Dx:  Fibroids, submucosal; Menorrhagia wit    Component    Case Report   Surgical Pathology Report                         Case: [id number]                                    Authorizing Provider: Eleno Murray MD           Collected:           05/18/2021 1239               Ordering Location:     [facility]        Received:            05/18/2021 [address] [place] [address] Operating Room                                                      Pathologist:           Paradise Mariano MD                                                                Specimen:    Endometrium, [facility]                                                                           Final Diagnosis   A  Endometrium (curettage): - Benign endometrial stromal breakdown  - Scant benign squamous mucosa and endocervical glands   - No carcinoma identified      Interpretation performed at [facility], [address] [place] [address], [address] [place] [address]      Electronically signed by Paradise Mariano MD on 5/21/2021 at  1:05 PM   Additional Information    All reported additional testing was performed with appropriately reactive controls   These tests were developed and their performance characteristics determined by [facility] or appropriate performing facility, though some tests may be performed on tissues which have not been validated for performance characteristics (such as staining performed on alcohol exposed cell blocks and decalcified tissues)   Results should be interpreted with caution and in the context of the patients clinical condition  These tests may not be cleared or approved by the [place]  Food and Drug Administration, though the FDA has determined that such clearance or approval is not necessary   These tests are used for clinical purposes and they should not be regarded as investigational or for research  This laboratory has been approved by [place] [address], designated as a high-complexity laboratory and is qualified to perform these tests  Chuck Damon Description       A  The specimen is received in formalin, labeled with the patient's name and hospital number, and is designated "endometrial curettings  The specimen consists of an aggregate of red-brown, clotted hemorrhagic material with minute tissue fragments measuring 2 5 x 2 4 x 0 4 cm  The specimen is entirely submitted in an embedding bag in 1 cassette      Note: The estimated total formalin fixation time based upon information provided by the submitting clinician and the standard processing schedule is under 72 hours    [place]        Resulting Agency [facility]         Specimen Collected: 05/18/21 12:39 PM Last Resulted: 05/21/21  1:05 PM       Order Details     View Encounter     Lab and Collection Details     Routing     Result History           Scans on Order [id number]    Lab Result Document - Document on 5/21/2021  1:05 PM             Authorizing Provider Information    Name: Gela Varner MD Fax: [phone]   Phone: [phone] Pager:    PDF Results and Scans    Document on 5/21/2021  1:05 PM by Elaina Ambrose MD

## 2021-06-03 NOTE — PATIENT INSTRUCTIONS
Endometrial Ablation   WHAT YOU SHOULD KNOW:   Endometrial ablation (EA) is a procedure to destroy the endometrium (lining of your uterus)  You may need EA if you have heavy or abnormal vaginal bleeding  AFTER YOU LEAVE:   Medicines:   · Medicines  can help decrease pain, calm your stomach, and control vomiting  · Take your medicine as directed  Call your healthcare provider if you think your medicine is not helping or if you have side effects  Tell him if you are allergic to any medicine  Keep a list of the medicines, vitamins, and herbs you take  Include the amounts, and when and why you take them  Bring the list or the pill bottles to follow-up visits  Carry your medicine list with you in case of an emergency  What to expect after your procedure:   · Cramps, similar to menstrual cramps, for 1 to 2 days    · Watery, bloody discharge for 2 to 3 days that may become light and last a few weeks    · Frequent urination for 24 hours    · Nausea  Activity:  Ask when you can return to your normal activities  You may need to avoid sex for 6 weeks after your procedure  Birth control: You may need to use birth control after your procedure to prevent pregnancy  Pregnancy risks, such as a miscarriage, are higher after EA  Talk to your healthcare provider about birth control and having children after EA  Follow up with your healthcare provider as directed:  Write down your questions so you remember to ask them during your visits  Contact your healthcare provider if:   · The bleeding during your monthly periods has not decreased  · You have pain when you urinate  · You have questions or concerns about your condition or care  Seek care immediately or call 911 if:   · You feel lightheaded, short of breath, and have chest pain  · You cough up blood  · Your arm or leg feels warm, tender, and painful  It may look swollen and red      · You have vaginal bleeding and it is not time for your monthly period  · You have a fever  · You feel dizzy, weak, and confused  · You cannot stop vomiting  · You have severe pain  © 2014 [address] [employer] is for End User's use only and may not be sold, redistributed or otherwise used for commercial purposes  All illustrations and images included in CareNotes® are the copyrighted property of A D A M , Inc  or Harry Schultz  The above information is an  only  It is not intended as medical advice for individual conditions or treatments  Talk to your doctor, nurse or pharmacist before following any medical regimen to see if it is safe and effective for you

## 2021-06-04 RX ORDER — ZOLPIDEM TARTRATE 10 MG/1
TABLET ORAL
Qty: 30 TABLET | Refills: 0 | OUTPATIENT
Start: 2021-06-04

## 2021-06-04 RX ORDER — ZOLPIDEM TARTRATE 10 MG/1
10 TABLET ORAL
Qty: 30 TABLET | Refills: 0 | Status: SHIPPED | OUTPATIENT
Start: 2021-06-04 | End: 2021-07-03 | Stop reason: SDUPTHER

## 2021-06-04 NOTE — TELEPHONE ENCOUNTER
PDMP reviewed  No red flags identified; safe to proceed with prescription      PDMP Review       Value Time User    PDMP Reviewed  Yes 6/4/2021  8:22 AM Marti Mcadams MD

## 2021-07-03 DIAGNOSIS — G47.09 OTHER INSOMNIA: ICD-10-CM

## 2021-07-06 RX ORDER — ZOLPIDEM TARTRATE 10 MG/1
10 TABLET ORAL
Qty: 30 TABLET | Refills: 0 | Status: SHIPPED | OUTPATIENT
Start: 2021-07-06 | End: 2021-08-01 | Stop reason: SDUPTHER

## 2021-07-06 NOTE — TELEPHONE ENCOUNTER
PDMP reviewed  No red flags identified; safe to proceed with prescription      PDMP Review       Value Time User    PDMP Reviewed  Yes 7/6/2021  9:43 AM Amber Reed MD

## 2021-07-12 ENCOUNTER — HOSPITAL ENCOUNTER (OUTPATIENT)
Dept: RADIOLOGY | Facility: CLINIC | Age: 41
Discharge: HOME | End: 2021-07-12
Payer: COMMERCIAL

## 2021-07-12 VITALS — HEIGHT: 71 IN | BODY MASS INDEX: 24.92 KG/M2 | WEIGHT: 178 LBS

## 2021-07-12 DIAGNOSIS — Z12.31 ENCOUNTER FOR SCREENING MAMMOGRAM FOR MALIGNANT NEOPLASM OF BREAST: ICD-10-CM

## 2021-07-12 PROCEDURE — 77067 SCR MAMMO BI INCL CAD: CPT

## 2021-07-12 PROCEDURE — 77063 BREAST TOMOSYNTHESIS BI: CPT

## 2021-08-01 DIAGNOSIS — G47.09 OTHER INSOMNIA: ICD-10-CM

## 2021-08-02 RX ORDER — ZOLPIDEM TARTRATE 10 MG/1
10 TABLET ORAL
Qty: 30 TABLET | Refills: 0 | Status: SHIPPED | OUTPATIENT
Start: 2021-08-02 | End: 2021-08-30 | Stop reason: SDUPTHER

## 2021-08-02 NOTE — TELEPHONE ENCOUNTER
PDMP reviewed  No red flags identified; safe to proceed with prescription      PDMP Review       Value Time User    PDMP Reviewed  Yes 8/2/2021  8:45 AM Lubna Schwarz MD

## 2021-08-30 ENCOUNTER — TELEPHONE (OUTPATIENT)
Dept: FAMILY MEDICINE CLINIC | Facility: CLINIC | Age: 41
End: 2021-08-30

## 2021-08-30 DIAGNOSIS — M79.672 FOOT PAIN, LEFT: Primary | ICD-10-CM

## 2021-08-30 DIAGNOSIS — G47.09 OTHER INSOMNIA: ICD-10-CM

## 2021-08-30 DIAGNOSIS — E05.00 GRAVES' DISEASE: ICD-10-CM

## 2021-08-30 NOTE — TELEPHONE ENCOUNTER
Info for [facility], Dr Sean MORGAN [phone]  [facility]: [phone]  [facility]: [phone]    Appt:  Wed 09/08/21 @ 0930

## 2021-08-30 NOTE — TELEPHONE ENCOUNTER
Insurance referral faxed over to podiatrist Subjective:       Didier Barnes is a 14 y.o. male who is brought in by mother for this well-child visit. Immunization History   Administered Date(s) Administered    Hepatitis A Ped/Adol (Havrix, Vaqta) 09/01/2016, 08/26/2021    MMRV (ProQuad) 09/01/2016    Meningococcal MCV4O (Menveo) 08/26/2021    Polio IPV (IPOL) 09/01/2016    Tdap (Boostrix, Adacel) 09/01/2016, 08/26/2021       Patient's medications, allergies, past medical, surgical, social and family histories were reviewed and updated as appropriate. Current Issues:  Current concerns include none.     Current dietary habits: regular diet  Current sleep habits: sleeping fine      Social Screening:  Sibling relations: brothers: 1  Discipline concerns? no  Concerns regarding behavior with peers? no  School performance: doing well; no concerns  Tobacco Use? no      Review of Systems  Positive responses are highlighted in bold    Constitutional:  Fever, Chills, Fatigue, Unexpected changes in weight  Eyes:  Eye discharge, Eye pain, Eye redness, Visual disturbances   HENT:  Ear pain, Tinnitus, Nosebleeds, Trouble swallowing  Cardiovascular:  Chest Pain, Palpitations  Respiratory:  Cough, Wheezing, Shortness of breath, Chest tightness, Apnea  Gastrointestinal:  Nausea, Vomiting, Diarrhea, Constipation, Heartburn, Blood in stool  Genitourinary:  Difficulty or painful urination, Flank pain, Change in frequency, Urgency  Skin:  Color change, Rash, Itching, Wound  Psychiatric:  Hallucinations, Anxiety, Depression, Suicidal ideation  Hematological:  Enlarged glands, Easy bleeding, Easily bruising  Musculoskeletal:  Joint pain, Back pain, Gait problems, Joint swelling, Myalgias  Neurological:  Dizziness, Headaches, Presyncope, Numbness, Seizures, Tremors  Allergy:  Environmental allergies, Food allergies  Endocrine:  Heat Intolerance, Cold Intolerance, Polydipsia, Polyphagia, Polyuria       Objective:     /78 (Site: Right Upper Arm, Position: Sitting, Cuff Size: Medium Adult)   Pulse 72   Resp 16   Ht 5' 2.99\" (1.6 m)   Wt 149 lb (67.6 kg)   BMI 26.40 kg/m²   Growth parameters are noted and are appropriate for age. Vision screening done? no    Dentist? yes    Vitals:    08/26/21 0828   BP: 118/78   Pulse: 72   Resp: 16     General Appearance: well developed and well- nourished, in no acute distress  Head: normocephalic and atraumatic  Eyes: pupils equal, round, and reactive to light, extraocular eye movements intact, conjunctivae and eye lids without erythema  ENT: external ear and ear canal normal bilaterally, TMs intact and regular, nose without deformity, nasal mucosa and turbinates normal without polyps, oropharynx normal, dentition is normal for age  Neck: supple and non-tender without mass, no thyromegaly or thyroid nodules, no cervical lymphadenopathy  Pulmonary/Chest: clear to auscultation bilaterally- no wheezes, rales or rhonchi, normal air movement, no respiratory distress or retractions  Cardiovascular: normal rate, regular rhythm, normal S1 and S2, no murmurs, rubs, clicks, or gallops, distal pulses intact, no carotid bruits  Abdomen: soft, non-tender, non-distended, normal bowel sounds,  no rebound or guarding, no masses or hernias noted  Extremities: no cyanosis, clubbing or edema of the lower extremities  Musculoskeletal: No joint swelling or gross deformity   Neuro:  Alert, 5/5 strength globally and symmetrically  Psych:  Normal affect without evidence of depression or anxiety, insight and judgement are appropriate  Skin: warm and dry, no rash or erythema, nails down brown coloration  Lymph:  No cervical, auricular or supraclavicular lymph nodes palpated     Assessment and Plan:       Elaine Araujo was seen today for new patient.     Diagnoses and all orders for this visit:    Encounter to establish care    Encounter for well child visit at 14years of age    Need for meningitis vaccination  -     Meningococcal MCV4O (age 1m-47y) IM ([address])    Need for Tdap vaccination  -     Tdap (age 6y and older) IM ([address])    Need for hepatitis A immunization  -     Hep A Vaccine Ped/Adol (VAQTA)    Anemia, unspecified type  -     CBC With Auto Differential; Future      - update immunizations, will bring back in 2 weeks and update more immunizations  - suspect iron deficiency anemia, will check CBC    Return in about 1 year (around 8/26/2022) for well child check, 2 week nurse visit for immunizations. Anticipatory guidance given today. Follow up in 1 years.

## 2021-08-30 NOTE — TELEPHONE ENCOUNTER
Patient called and would like a referral to podiatry, it was last discussed at her visit in May and it has really been bothering her lately

## 2021-09-01 RX ORDER — ZOLPIDEM TARTRATE 10 MG/1
10 TABLET ORAL
Qty: 30 TABLET | Refills: 0 | Status: SHIPPED | OUTPATIENT
Start: 2021-09-01 | End: 2021-10-05

## 2021-09-01 RX ORDER — METHIMAZOLE 5 MG/1
TABLET ORAL
Qty: 90 TABLET | Refills: 0 | Status: SHIPPED | OUTPATIENT
Start: 2021-09-01 | End: 2022-02-21 | Stop reason: SDUPTHER

## 2021-09-10 ENCOUNTER — PATIENT MESSAGE (OUTPATIENT)
Dept: FAMILY MEDICINE CLINIC | Facility: CLINIC | Age: 41
End: 2021-09-10

## 2021-09-10 DIAGNOSIS — B37.3 VAGINA, CANDIDIASIS: Primary | ICD-10-CM

## 2021-09-10 RX ORDER — FLUCONAZOLE 150 MG/1
150 TABLET ORAL
Qty: 3 TABLET | Refills: 0 | Status: SHIPPED | OUTPATIENT
Start: 2021-09-10 | End: 2021-09-17

## 2021-09-10 NOTE — TELEPHONE ENCOUNTER
Bogdan Syed MA 9/10/2021 7:08 AM EDT      ----- Message -----  From: Freddie Saxena  Sent: 9/10/2021 6:46 AM EDT  To: [facility]  Subject: Prescription Question     Hello,    I have a yeast infection that I cannot seem to shake  Can you please call in a fluconazole to [facility] in [place] [facility]? Thank you

## 2021-10-04 DIAGNOSIS — G47.09 OTHER INSOMNIA: ICD-10-CM

## 2021-10-05 RX ORDER — ZOLPIDEM TARTRATE 10 MG/1
TABLET ORAL
Qty: 30 TABLET | Refills: 0 | Status: SHIPPED | OUTPATIENT
Start: 2021-10-05 | End: 2021-11-01 | Stop reason: SDUPTHER

## 2021-11-01 DIAGNOSIS — G47.09 OTHER INSOMNIA: ICD-10-CM

## 2021-11-02 RX ORDER — ZOLPIDEM TARTRATE 10 MG/1
10 TABLET ORAL
Qty: 30 TABLET | Refills: 0 | Status: SHIPPED | OUTPATIENT
Start: 2021-11-02 | End: 2021-12-02

## 2021-11-11 ENCOUNTER — ANNUAL EXAM (OUTPATIENT)
Dept: OBGYN CLINIC | Facility: CLINIC | Age: 41
End: 2021-11-11
Payer: COMMERCIAL

## 2021-11-11 VITALS
TEMPERATURE: 96.9 F | HEIGHT: 71 IN | SYSTOLIC BLOOD PRESSURE: 112 MMHG | BODY MASS INDEX: 25.2 KG/M2 | WEIGHT: 180 LBS | DIASTOLIC BLOOD PRESSURE: 70 MMHG

## 2021-11-11 DIAGNOSIS — Z12.31 ENCOUNTER FOR SCREENING MAMMOGRAM FOR MALIGNANT NEOPLASM OF BREAST: ICD-10-CM

## 2021-11-11 DIAGNOSIS — Z01.419 ENCNTR FOR GYN EXAM (GENERAL) (ROUTINE) W/O ABN FINDINGS: Primary | ICD-10-CM

## 2021-11-11 DIAGNOSIS — Z98.890 S/P ENDOMETRIAL ABLATION: ICD-10-CM

## 2021-11-11 PROCEDURE — S0612 ANNUAL GYNECOLOGICAL EXAMINA: HCPCS | Performed by: OBSTETRICS & GYNECOLOGY

## 2021-11-15 ENCOUNTER — OFFICE VISIT (OUTPATIENT)
Dept: FAMILY MEDICINE CLINIC | Facility: CLINIC | Age: 41
End: 2021-11-15
Payer: COMMERCIAL

## 2021-11-15 VITALS
WEIGHT: 183.6 LBS | HEIGHT: 71 IN | SYSTOLIC BLOOD PRESSURE: 122 MMHG | HEART RATE: 76 BPM | BODY MASS INDEX: 25.7 KG/M2 | OXYGEN SATURATION: 98 % | TEMPERATURE: 98 F | DIASTOLIC BLOOD PRESSURE: 78 MMHG

## 2021-11-15 DIAGNOSIS — Z23 NEEDS FLU SHOT: ICD-10-CM

## 2021-11-15 DIAGNOSIS — D50.0 IRON DEFICIENCY ANEMIA DUE TO CHRONIC BLOOD LOSS: ICD-10-CM

## 2021-11-15 DIAGNOSIS — Z00.00 GENERAL MEDICAL EXAM: Primary | ICD-10-CM

## 2021-11-15 DIAGNOSIS — N64.3 GALACTORRHEA: ICD-10-CM

## 2021-11-15 DIAGNOSIS — G47.09 OTHER INSOMNIA: ICD-10-CM

## 2021-11-15 DIAGNOSIS — Z12.4 SCREENING FOR CERVICAL CANCER: ICD-10-CM

## 2021-11-15 DIAGNOSIS — E05.00 GRAVES' DISEASE: ICD-10-CM

## 2021-11-15 DIAGNOSIS — Z11.59 NEED FOR HEPATITIS C SCREENING TEST: ICD-10-CM

## 2021-11-15 DIAGNOSIS — J45.20 MILD INTERMITTENT REACTIVE AIRWAY DISEASE WITHOUT COMPLICATION: ICD-10-CM

## 2021-11-15 PROCEDURE — 1036F TOBACCO NON-USER: CPT | Performed by: INTERNAL MEDICINE

## 2021-11-15 PROCEDURE — 3008F BODY MASS INDEX DOCD: CPT | Performed by: INTERNAL MEDICINE

## 2021-11-15 PROCEDURE — 99396 PREV VISIT EST AGE 40-64: CPT | Performed by: INTERNAL MEDICINE

## 2021-12-01 DIAGNOSIS — G47.09 OTHER INSOMNIA: ICD-10-CM

## 2021-12-02 ENCOUNTER — TELEPHONE (OUTPATIENT)
Dept: OBGYN CLINIC | Facility: CLINIC | Age: 41
End: 2021-12-02

## 2021-12-02 RX ORDER — ZOLPIDEM TARTRATE 10 MG/1
TABLET ORAL
Qty: 30 TABLET | Refills: 0 | Status: SHIPPED | OUTPATIENT
Start: 2021-12-02 | End: 2021-12-30

## 2021-12-09 ENCOUNTER — OFFICE VISIT (OUTPATIENT)
Dept: URGENT CARE | Facility: CLINIC | Age: 41
End: 2021-12-09
Payer: COMMERCIAL

## 2021-12-09 VITALS
BODY MASS INDEX: 25.2 KG/M2 | HEART RATE: 60 BPM | OXYGEN SATURATION: 100 % | WEIGHT: 180 LBS | TEMPERATURE: 97.1 F | RESPIRATION RATE: 16 BRPM | HEIGHT: 71 IN

## 2021-12-09 DIAGNOSIS — R68.89 FLU-LIKE SYMPTOMS: Primary | ICD-10-CM

## 2021-12-09 DIAGNOSIS — Z20.822 EXPOSURE TO COVID-19 VIRUS: ICD-10-CM

## 2021-12-09 PROCEDURE — 99213 OFFICE O/P EST LOW 20 MIN: CPT | Performed by: PHYSICIAN ASSISTANT

## 2021-12-09 PROCEDURE — U0005 INFEC AGEN DETEC AMPLI PROBE: HCPCS | Performed by: PHYSICIAN ASSISTANT

## 2021-12-09 PROCEDURE — U0003 INFECTIOUS AGENT DETECTION BY NUCLEIC ACID (DNA OR RNA); SEVERE ACUTE RESPIRATORY SYNDROME CORONAVIRUS 2 (SARS-COV-2) (CORONAVIRUS DISEASE [COVID-19]), AMPLIFIED PROBE TECHNIQUE, MAKING USE OF HIGH THROUGHPUT TECHNOLOGIES AS DESCRIBED BY CMS-2020-01-R: HCPCS | Performed by: PHYSICIAN ASSISTANT

## 2021-12-10 LAB — SARS-COV-2 RNA RESP QL NAA+PROBE: NEGATIVE

## 2021-12-30 DIAGNOSIS — G47.09 OTHER INSOMNIA: ICD-10-CM

## 2021-12-30 RX ORDER — ZOLPIDEM TARTRATE 10 MG/1
TABLET ORAL
Qty: 30 TABLET | Refills: 0 | Status: SHIPPED | OUTPATIENT
Start: 2021-12-30 | End: 2022-01-27 | Stop reason: SDUPTHER

## 2022-01-05 ENCOUNTER — DOCUMENTATION (OUTPATIENT)
Dept: GYNECOLOGY | Facility: CLINIC | Age: 42
End: 2022-01-05

## 2022-01-05 NOTE — PROGRESS NOTES
Received reminder that pt is due for repeat u/s, as recommended from 1/12 u/s  provider to be notified to place order if needed

## 2022-01-27 DIAGNOSIS — G47.09 OTHER INSOMNIA: ICD-10-CM

## 2022-01-28 RX ORDER — ZOLPIDEM TARTRATE 10 MG/1
10 TABLET ORAL
Qty: 30 TABLET | Refills: 0 | Status: SHIPPED | OUTPATIENT
Start: 2022-01-28 | End: 2022-02-27 | Stop reason: SDUPTHER

## 2022-01-28 NOTE — TELEPHONE ENCOUNTER
PDMP reviewed  No red flags identified; safe to proceed with prescription      PDMP Review       Value Time User    PDMP Reviewed  Yes 1/28/2022  8:19 AM Barbar Cockayne, MD

## 2022-02-18 ENCOUNTER — LAB (OUTPATIENT)
Dept: LAB | Facility: CLINIC | Age: 42
End: 2022-02-18
Payer: COMMERCIAL

## 2022-02-18 DIAGNOSIS — N64.3 GALACTORRHEA: ICD-10-CM

## 2022-02-18 DIAGNOSIS — Z11.59 NEED FOR HEPATITIS C SCREENING TEST: ICD-10-CM

## 2022-02-18 DIAGNOSIS — D50.0 IRON DEFICIENCY ANEMIA DUE TO CHRONIC BLOOD LOSS: ICD-10-CM

## 2022-02-18 DIAGNOSIS — E05.00 GRAVES' DISEASE: ICD-10-CM

## 2022-02-18 LAB
ERYTHROCYTE [DISTWIDTH] IN BLOOD BY AUTOMATED COUNT: 12.5 % (ref 11.6–15.1)
FERRITIN SERPL-MCNC: 40 NG/ML (ref 8–388)
HCT VFR BLD AUTO: 42.4 % (ref 34.8–46.1)
HCV AB SER QL: NORMAL
HGB BLD-MCNC: 13.2 G/DL (ref 11.5–15.4)
IRON SATN MFR SERPL: 35 % (ref 15–50)
IRON SERPL-MCNC: 116 UG/DL (ref 50–170)
MCH RBC QN AUTO: 27.8 PG (ref 26.8–34.3)
MCHC RBC AUTO-ENTMCNC: 31.1 G/DL (ref 31.4–37.4)
MCV RBC AUTO: 90 FL (ref 82–98)
PLATELET # BLD AUTO: 247 THOUSANDS/UL (ref 149–390)
PMV BLD AUTO: 10.8 FL (ref 8.9–12.7)
PROLACTIN SERPL-MCNC: 10.5 NG/ML
RBC # BLD AUTO: 4.74 MILLION/UL (ref 3.81–5.12)
TIBC SERPL-MCNC: 336 UG/DL (ref 250–450)
TSH SERPL DL<=0.05 MIU/L-ACNC: 1.33 UIU/ML (ref 0.36–3.74)
WBC # BLD AUTO: 4.57 THOUSAND/UL (ref 4.31–10.16)

## 2022-02-18 PROCEDURE — 86803 HEPATITIS C AB TEST: CPT

## 2022-02-18 PROCEDURE — 83540 ASSAY OF IRON: CPT

## 2022-02-18 PROCEDURE — 84443 ASSAY THYROID STIM HORMONE: CPT

## 2022-02-18 PROCEDURE — 84146 ASSAY OF PROLACTIN: CPT

## 2022-02-18 PROCEDURE — 36415 COLL VENOUS BLD VENIPUNCTURE: CPT

## 2022-02-18 PROCEDURE — 85027 COMPLETE CBC AUTOMATED: CPT

## 2022-02-18 PROCEDURE — 82728 ASSAY OF FERRITIN: CPT

## 2022-02-18 PROCEDURE — 83550 IRON BINDING TEST: CPT

## 2022-02-21 ENCOUNTER — OFFICE VISIT (OUTPATIENT)
Dept: FAMILY MEDICINE CLINIC | Facility: CLINIC | Age: 42
End: 2022-02-21
Payer: COMMERCIAL

## 2022-02-21 ENCOUNTER — APPOINTMENT (OUTPATIENT)
Dept: RADIOLOGY | Facility: CLINIC | Age: 42
End: 2022-02-21
Payer: COMMERCIAL

## 2022-02-21 VITALS
HEART RATE: 70 BPM | BODY MASS INDEX: 25.7 KG/M2 | HEIGHT: 71 IN | OXYGEN SATURATION: 96 % | TEMPERATURE: 98 F | WEIGHT: 183.6 LBS | DIASTOLIC BLOOD PRESSURE: 72 MMHG | SYSTOLIC BLOOD PRESSURE: 116 MMHG

## 2022-02-21 DIAGNOSIS — M54.2 NECK PAIN: Primary | ICD-10-CM

## 2022-02-21 DIAGNOSIS — E05.00 GRAVES' DISEASE: ICD-10-CM

## 2022-02-21 DIAGNOSIS — M79.672 FOOT PAIN, LEFT: ICD-10-CM

## 2022-02-21 DIAGNOSIS — M54.2 NECK PAIN: ICD-10-CM

## 2022-02-21 DIAGNOSIS — M79.672 INTRACTABLE LEFT HEEL PAIN: ICD-10-CM

## 2022-02-21 PROCEDURE — 99214 OFFICE O/P EST MOD 30 MIN: CPT | Performed by: INTERNAL MEDICINE

## 2022-02-21 PROCEDURE — 72050 X-RAY EXAM NECK SPINE 4/5VWS: CPT

## 2022-02-21 PROCEDURE — 3008F BODY MASS INDEX DOCD: CPT | Performed by: INTERNAL MEDICINE

## 2022-02-21 PROCEDURE — 1036F TOBACCO NON-USER: CPT | Performed by: INTERNAL MEDICINE

## 2022-02-21 PROCEDURE — 3725F SCREEN DEPRESSION PERFORMED: CPT | Performed by: INTERNAL MEDICINE

## 2022-02-21 RX ORDER — METHIMAZOLE 5 MG/1
TABLET ORAL
Qty: 90 TABLET | Refills: 1 | Status: SHIPPED | OUTPATIENT
Start: 2022-02-21

## 2022-02-21 NOTE — PROGRESS NOTES
Assessment/Plan:    Foot pain, left  Etiology of her left heel pain is unclear  She does seem to have a soft tissue nodule there is well  I am going to refer her to see Dr Mariam Garcia [place]  He is a foot and ankle orthopedist     Neck pain  Suspect that the pain is more muscular  She seems to have pretty good range of motion with the exception of left lateral flexion  Check a plain film and refer to physical therapy  BMI Counseling: Body mass index is 25 61 kg/m²  The BMI is above normal  Exercise recommendations include exercising 3-5 times per week  Rationale for BMI follow-up plan is due to patient being overweight or obese  Depression Screening and Follow-up Plan: Patient was screened for depression during today's encounter  They screened negative with a PHQ-2 score of 0  The subcutaneous nodule in the left lower lumbar region is more than likely a lipoma  We can consider referral for surgical resection when she returns for follow-up  Chief Complaint     Foot Pain; Neck Pain; Care Gap Request          Patient ID: Foreign Clark is a 42 y o  female who returns for neck pain  She went to a chiropractor for an adjustment but she is till having pain  She also has a painful lump on her left heel  She sometimes wears sneakers to bed because it makes her heel pain better  It wakes her up at night as well  She has been to several podiatrist and they have been unable to make a firm diagnosis  She has had a steroid injection into her left heel which improved the pain for couple of days but then it returned with its previous severity  She also has a lump in her left lower lumbar region which she wanted to have evaluated is well        Objective:    /72 (BP Location: Right arm, Patient Position: Sitting)   Pulse 70   Temp 98 °F (36 7 °C)   Ht 5' 11" (1 803 m)   Wt 83 3 kg (183 lb 9 6 oz)   SpO2 96%   BMI 25 61 kg/m²     Wt Readings from Last 3 Encounters:   02/21/22 83 3 kg (183 lb 9 6 oz)   12/09/21 81 6 kg (180 lb)   11/15/21 83 3 kg (183 lb 9 6 oz)         Physical Exam    General:  Well-developed, well-nourished, in no acute distress  Neck:  Full range of motion in all axes with the exception of left lateral flexion which is moderately diminished compared to right lateral flexion  Neurologic:  Motor was 5/5 in both of her upper extremities   Musculoskeletal:  She does have a palpable, movable soft tissue mass on the plantar aspect of her left heel  It has moderate tenderness to deep palpation  Skin:  She does have a 3 cm freely mobile fatty nodule in the left lower lumbar region

## 2022-02-22 NOTE — ASSESSMENT & PLAN NOTE
Etiology of her left heel pain is unclear  She does seem to have a soft tissue nodule there is well  I am going to refer her to see Dr Annie Valverde [place]    He is a foot and ankle orthopedist

## 2022-02-27 DIAGNOSIS — G47.09 OTHER INSOMNIA: ICD-10-CM

## 2022-02-28 ENCOUNTER — TELEPHONE (OUTPATIENT)
Dept: FAMILY MEDICINE CLINIC | Facility: CLINIC | Age: 42
End: 2022-02-28

## 2022-02-28 ENCOUNTER — EVALUATION (OUTPATIENT)
Dept: PHYSICAL THERAPY | Facility: CLINIC | Age: 42
End: 2022-02-28
Payer: COMMERCIAL

## 2022-02-28 DIAGNOSIS — M79.672 FOOT PAIN, LEFT: Primary | ICD-10-CM

## 2022-02-28 DIAGNOSIS — M79.672 LEFT FOOT PAIN: Primary | ICD-10-CM

## 2022-02-28 DIAGNOSIS — M54.2 NECK PAIN: ICD-10-CM

## 2022-02-28 PROCEDURE — 97110 THERAPEUTIC EXERCISES: CPT | Performed by: PHYSICAL THERAPIST

## 2022-02-28 PROCEDURE — 97112 NEUROMUSCULAR REEDUCATION: CPT | Performed by: PHYSICAL THERAPIST

## 2022-02-28 PROCEDURE — 97162 PT EVAL MOD COMPLEX 30 MIN: CPT | Performed by: PHYSICAL THERAPIST

## 2022-02-28 RX ORDER — ZOLPIDEM TARTRATE 10 MG/1
10 TABLET ORAL
Qty: 30 TABLET | Refills: 0 | Status: SHIPPED | OUTPATIENT
Start: 2022-02-28 | End: 2022-03-29 | Stop reason: SDUPTHER

## 2022-02-28 NOTE — TELEPHONE ENCOUNTER
PDMP reviewed  No red flags identified; safe to proceed with prescription      PDMP Review       Value Time User    PDMP Reviewed  Yes 2/28/2022  8:33 AM Sb Wills MD

## 2022-02-28 NOTE — PROGRESS NOTES
PT Evaluation     Today's date: 2022  Patient name: Deb Nicole  : 1980  MRN: [id number]  Referring provider: Christiano Butler MD  Dx:   Encounter Diagnosis     ICD-10-CM    1  Left foot pain  M79 672    2  Neck pain  M54 2 Ambulatory referral to Physical Therapy                  Assessment  Assessment details: Deb Nicole is a pleasant 42 y o  female who presents with neck and foot pain  The primary movement problem is posterior cervical derangement (neck) & impaired gastroc length (foot) resulting in neck and foot pain and limiting her ability to care for self, drive, exercise or recreation, get out of a chair, perform household chores, perform yard work, sit, sleep, squat to  objects from the floor, stand, turn to look over shoulder, ambulate stairs, and walk  No further referral appears necessary at this time based upon examination results  The patient's greatest concerns are worry over not knowing what's wrong, concern at no signs of improvement and fear of not being able to keep active  Pt demonstrated a favorable response to repeated cervical retraction movements as demonstrated by improvement in L rotation ROM by >10 deg & verbal reports of reduction in pain with comparable sign of L rotation  She was instructed on a HEP that focused postural correction and repeated cervical retraction with stretching of her gastroc  Pt verbalized and demonstrated understanding  Problem List:  Neck  1) Posterior cervical derangement  2) Impaired L cervical rotation  3) Impaired posture    Foot  1) Impaired gastroc length  2) Impaired TC joint mobility     Etiologic factors include none recalled by the patient    Impairments: abnormal gait, abnormal muscle firing, abnormal muscle tone, abnormal or restricted ROM, abnormal movement, activity intolerance, impaired physical strength, lacks appropriate home exercise program, pain with function, poor posture  and poor body mechanics    Symptom irritability: moderateUnderstanding of Dx/Px/POC: fair   Prognosis: fair  Prognosis details: Positive prognostic indicators include positive attitude toward recovery, good understanding of diagnosis and treatment plan options and absence of observed red flags  Negative prognostic indicators include chronicity of symptoms, high symptom irritability, multiple concurrent orthopedic problems and multiple prior failed treatments  Goals  ST  Independent with HEP in 2 weeks  2  Pt will have verbal report of improvement in symptoms by >/=25% in 2 weeks     To be achieved by D/C   LT  Pt will improve FOTO score by >/= 5 points in 6 weeks  2  Pt will improve FOTO score to >/= 66 by visit # 15 (foot)  3  Pt will be able to have verbal reports of improved quality of sleep   4  Pt will be able to ascend/descend stairs with little difficulty   5  Pt will be able functional L rotation ROM in order to drive and check her mirrors safely without compensations   6  Pt will be able to return to recreational activities   7  Pt will be independent with symptom management        Plan  Patient would benefit from: skilled physical therapy  Planned therapy interventions: activity modification, joint mobilization, manual therapy, motor coordination training, neuromuscular re-education, patient education, self care, therapeutic activities, therapeutic exercise, graded activity, home exercise program, behavior modification, graded exercise, functional ROM exercises, strengthening and Benedict taping  Frequency: 2x week  Duration in weeks: 8  Plan of Care beginning date: 2022  Plan of Care expiration date: 2022  Treatment plan discussed with: patient        Subjective Evaluation    History of Present Illness  Mechanism of injury: Pt reports that around  she woke up one morning with neck pain   She then went to the chiropractor but then had an increased pain from the chiropractor and has not sought out any other treatments since  She has pain going into both extremities  Her pain in the neck is intermittent  Neck:   Worse: sitting couch/recliner, pressure, looking down at her neck,   Better: laying flat,    Foot  She has been having chronic pain in her B/L feet since she was a teen  She was seen many different physicians regarding treatment of her foot  She has received many injections in her foot when she was younger which never seemed to fully resolve her symptoms  She has increased pain with all activities and even will have difficulty getting to sleep at night because of increased pain she is having in the foot  Pain  At best pain rating: 3 (neck: 0)  At worst pain ratin (neck: 6)  Quality: sharp    Patient Goals  Patient goals for therapy: decreased pain  Patient goal: be able to get back to running, be able to sleep more, be able to check her mirror,         Objective     Concurrent Complaints  Positive for disturbed sleep and headaches  Negative for night pain, dizziness, faints, nausea/motion sickness, tinnitus, trouble swallowing, difficulty breathing and shortness of breath    Additional Special Questions  Negative for nystagmus and diplopia     Static Posture     Head  Forward  Shoulders  Rounded      Postural Observations  Seated posture: fair  Standing posture: fair  Correction of posture: has no consistent effect        Neurological Testing     Sensation   Cervical/Thoracic   Left   Intact: light touch    Right   Intact: light touch    Active Range of Motion   Cervical/Thoracic Spine       Cervical  Subcranial protraction:  WFL and with pain   Subcranial retraction:   Restriction level: moderate  Flexion: 46 degrees  with pain  Extension: 75 degrees      Left lateral flexion: 36 degrees     with pain  Right lateral flexion: 42 degrees      Left rotation: 57 degrees with pain  Right rotation: 70 degrees         Left Ankle/Foot   Dorsiflexion (ke): 0 degrees with pain  Plantar flexion: 60 degrees Inversion: 55 degrees   Eversion: 35 degrees     Right Ankle/Foot   Dorsiflexion (ke): 0 degrees   Plantar flexion: 60 degrees   Inversion: 55 degrees   Eversion: 35 degrees     Passive Range of Motion   Left Ankle/Foot    Dorsiflexion (ke): 5 degrees   Great toe extension: 90 degrees     Right Ankle/Foot    Dorsiflexion (ke): 5 degrees   Great toe extension: 90 degrees     Joint Play   Left Ankle/Foot  Hypomobile in the talocrural joint  Mechanical Assessment    Cervical    Seated retraction: repeated movements   Pain location: no change  Pain intensity: better  Pain level: decreased    Thoracic      Lumbar      Strength/Myotome Testing   Cervical Spine     Left   Normal strength    Right   Normal strength    Additional Strength Details  Normal myotome strength B/L    Ambulation     Observational Gait   Gait: antalgic   Increased right stance time, left swing time and left step length  Decreased left stance time, right swing time and right step length  Neuro Exam:     Headaches   Patient reports headaches: Yes  Precautions: graves disease,   Access Code: [email or web address]  URL: [email or web address]  Date: 02/28/2022  Prepared by: Nataliia Parish         Manuals 2/28            gastroc stretching  KB            Fat pad taping for the foot             IASTM gastroc (if tolerated)             TC mobilizations             Neuro Re-Ed             Posture education x8 min            TB rows c holds for posture edu             TB ext c holds for posture edu                                                                 Ther Ex             Pt education on HEP, POC x15 min            Bike              Cervical retraction 2x15            Cervical retraction with self over pressure x15            Cervical ret/ext NV if able            Long sitting gastroc stretch 3x30s                                                   Ther Activity                                       Gait Training Modalities

## 2022-02-28 NOTE — TELEPHONE ENCOUNTER
Patient called and said she was just seen at the orthopedic office for her left foot pain, she said they did not do anything for her and was wondering if her physical therapy diagnosis can also add the foot pain so they can possibly treat her for it there

## 2022-03-02 ENCOUNTER — OFFICE VISIT (OUTPATIENT)
Dept: PHYSICAL THERAPY | Facility: CLINIC | Age: 42
End: 2022-03-02
Payer: COMMERCIAL

## 2022-03-02 DIAGNOSIS — M54.2 NECK PAIN: Primary | ICD-10-CM

## 2022-03-02 DIAGNOSIS — M79.672 LEFT FOOT PAIN: ICD-10-CM

## 2022-03-02 PROCEDURE — 97112 NEUROMUSCULAR REEDUCATION: CPT

## 2022-03-02 PROCEDURE — 97110 THERAPEUTIC EXERCISES: CPT

## 2022-03-02 NOTE — PROGRESS NOTES
Daily Note     Today's date: 3/2/2022  Patient name: Gela Olmedo  : 1980  MRN: [id number]  Referring provider: Los Apodaca MD  Dx:   Encounter Diagnosis     ICD-10-CM    1  Neck pain  M54 2    2  Left foot pain  M79 672                   Subjective: Patient reports decreased cervical restriction and pain  Objective: See treatment diary below      Assessment: Gela Olmedo appears to be responding to manual for cervical spine  Foot discomfort was approached with graded motor imagery and movements  She appeared to tolerate well  Continued PT for foot and cervical region should improve function  Plan: Continue per plan of care  Precautions: graves disease,   Access Code: [email or web address]  URL: [email or web address]  Date: 2022  Prepared by: Mercedes Castro         Manuals 2/28 3/2           gastroc stretching  KB nv           Fat pad taping for the foot             IASTM gastroc (if tolerated)             TC mobilizations             Neuro Re-Ed             Posture education x8 min            TB rows c holds for posture edu             TB ext c holds for posture edu                                                                 Ther Ex             Pt education on HEP, POC x15 min            Bike   L1 10'           Cervical retraction 2x15 supine 10"x10           Deep neck flexor  10"x10           Cervical retraction with self over pressure x15            Cervical ROM  nv           Long sitting gastroc stretch 3x30s            L ankle 4 way  RTB  x30 ea                                     Ther Activity             Mirror foot movements  x30 ea                        Gait Training                                       Modalities

## 2022-03-07 ENCOUNTER — OFFICE VISIT (OUTPATIENT)
Dept: PHYSICAL THERAPY | Facility: CLINIC | Age: 42
End: 2022-03-07
Payer: COMMERCIAL

## 2022-03-07 DIAGNOSIS — M79.672 LEFT FOOT PAIN: ICD-10-CM

## 2022-03-07 DIAGNOSIS — M54.2 NECK PAIN: Primary | ICD-10-CM

## 2022-03-07 PROCEDURE — 97112 NEUROMUSCULAR REEDUCATION: CPT

## 2022-03-07 PROCEDURE — 97140 MANUAL THERAPY 1/> REGIONS: CPT

## 2022-03-07 PROCEDURE — 97110 THERAPEUTIC EXERCISES: CPT

## 2022-03-07 NOTE — PROGRESS NOTES
Daily Note     Today's date: 3/7/2022  Patient name: Felicitas King  : 1980  MRN: [id number]  Referring provider: Camila Enriquez MD  Dx:   Encounter Diagnosis     ICD-10-CM    1  Neck pain  M54 2    2  Left foot pain  M79 672                   Subjective: Patient reports she has much better ROM of neck  Ankle is still sore  Objective: See treatment diary below      Assessment: Felicitas King  appears to be tolerating treatment well  she required moderate verbal cueing to complete exercises appropriate form and intensity with no tactile cues  Patient ankle symptoms should resolve with continued treatments  Plan: Continue per plan of care  Precautions: graves disease,   Access Code: [email or web address]  URL: [email or web address]  Date: 2022  Prepared by: Brando Michaud         Manuals 2/28 3/2 3/7          gastroc stretching  KB nv           Fat pad taping for the foot             IASTM gastroc (if tolerated)             Cervical ROM   10'          TC mobilizations             Neuro Re-Ed             Posture education x8 min            TB rows c holds for posture edu             TB ext c holds for posture edu                                                                 Ther Ex             Pt education on HEP, POC x15 min            Bike   L1 10' L1 10'          Cervical retraction 2x15 supine 10"x10 supine 10"x10          Deep neck flexor  10"x10 10"x10          Cervical retraction with self over pressure x15            Cervical ROM  nv x20 ea          Long sitting gastroc stretch 3x30s            L ankle 4 way  RTB  x30 ea RTB  x30 ea          Levator scap stretch   30"x4          Upper trap stretch   30"x4                                                 Ther Activity                          Stool pull   2x10          Swiss ball tri tap   x30          Swiss ball march   x30          Mirror foot movements  x30 ea x30 ea                       Gait Training Modalities

## 2022-03-09 ENCOUNTER — OFFICE VISIT (OUTPATIENT)
Dept: PHYSICAL THERAPY | Facility: CLINIC | Age: 42
End: 2022-03-09
Payer: COMMERCIAL

## 2022-03-09 DIAGNOSIS — M54.2 NECK PAIN: Primary | ICD-10-CM

## 2022-03-09 DIAGNOSIS — M79.672 LEFT FOOT PAIN: ICD-10-CM

## 2022-03-09 PROCEDURE — 97140 MANUAL THERAPY 1/> REGIONS: CPT

## 2022-03-09 PROCEDURE — 97112 NEUROMUSCULAR REEDUCATION: CPT

## 2022-03-09 PROCEDURE — 97110 THERAPEUTIC EXERCISES: CPT

## 2022-03-09 NOTE — PROGRESS NOTES
Daily Note     Today's date: 3/9/2022  Patient name: Justyna Cronin  : 1980  MRN: [id number]  Referring provider: Dre Mondragon MD  Dx:   Encounter Diagnosis     ICD-10-CM    1  Neck pain  M54 2    2  Left foot pain  M79 672                   Subjective: Patient reports heel still hurts  Cervical motion has improved  Objective: See treatment diary below      Assessment: Justyna Cronin continues with steady  progress towards cervical symptom relief goals  Patient heel appeared to have decreased discomfort post treatment  she required moderate verbal cueing to complete exercises appropriate form and intensity with no tactile cues  Patient symptoms should resolve with continued treatments  Plan: Continue per plan of care  Precautions: graves disease,   Access Code: [email or web address]  URL: [email or web address]  Date: 2022  Prepared by: Ender Haney         Manuals 2/28 3/2 3/7 3/9         gastroc stretching  KB nv           Fat pad taping for the foot             IASTM gastroc (if tolerated)    10'         Cervical ROM   10' 10'         TC mobilizations             Neuro Re-Ed             Posture education x8 min            TB rows c holds for posture edu'             TB ext c holds for posture edu                                                                 Ther Ex             Pt education on HEP, POC x15 min            Bike   L1 10' L1 10' L1 10'         Cervical retraction 2x15 supine 10"x10 supine 10"x10 supine 10"x10         Deep neck flexor  10"x10 10"x10 10"x10         Cervical retraction with self over pressure x15            Cervical ROM  nv x20 ea x20 ea         Long sitting gastroc stretch 3x30s            L ankle 4 way  RTB  x30 ea RTB  x30 ea RTB  x30 ea         Levator scap stretch   30"x4 30"x4         Upper trap stretch   30"x4 30"x4                                                Ther Activity                          Stool pull   2x10 2x10         Swiss ball LAQ    x30         Swiss ball tri tap   x30 x30         Swiss ball march   x30 x30         Mirror foot movements  x30 ea x30 ea x30 ea                      Gait Training                                       Modalities

## 2022-03-14 ENCOUNTER — OFFICE VISIT (OUTPATIENT)
Dept: PHYSICAL THERAPY | Facility: CLINIC | Age: 42
End: 2022-03-14
Payer: COMMERCIAL

## 2022-03-14 DIAGNOSIS — M54.2 NECK PAIN: Primary | ICD-10-CM

## 2022-03-14 DIAGNOSIS — M79.672 LEFT FOOT PAIN: ICD-10-CM

## 2022-03-14 PROCEDURE — 97112 NEUROMUSCULAR REEDUCATION: CPT

## 2022-03-14 PROCEDURE — 97140 MANUAL THERAPY 1/> REGIONS: CPT

## 2022-03-14 PROCEDURE — 97110 THERAPEUTIC EXERCISES: CPT

## 2022-03-14 NOTE — PROGRESS NOTES
Daily Note     Today's date: 3/14/2022  Patient name: Mónica Trejo  : 1980  MRN: [id number]  Referring provider: Ade Randle MD  Dx:   Encounter Diagnosis     ICD-10-CM    1  Neck pain  M54 2    2  Left foot pain  M79 672                   Subjective: Patient reports heel is a bit sore but she did have relief from last visit  Neck is definitely coming along  Objective: See treatment diary below      Assessment: Mónica Trejo continues with steady  progress towards pain resolution goals  Patient weight bearing on L LE appears to be increased  she required moderate verbal cueing to complete exercises appropriate form and intensity with no tactile cues  Patient function should improve with continued treatments  Plan: Continue per plan of care  Precautions: graves disease,   Access Code: [email or web address]  URL: [email or web address]  Date: 2022  Prepared by: Caroline Granados         Manuals 2/28 3/2 3/7 3/9 3/14        gastroc stretching  KB nv           Fat pad taping for the foot             IASTM gastroc (if tolerated)    10' 10'        Cervical ROM   10' 10' 10'        TC mobilizations             Neuro Re-Ed             Posture education x8 min            TB rows c holds for posture edu'             TB ext c holds for posture edu                                                                 Ther Ex             Pt education on HEP, POC x15 min            Bike   L1 10' L1 10' L1 10' L1 10'        Cervical retraction 2x15 supine 10"x10 supine 10"x10 supine 10"x10 supine 10"x10        Deep neck flexor  10"x10 10"x10 10"x10 10"x10        Cervical retraction with self over pressure x15            Cervical ROM  nv x20 ea x20 ea x20 ea        Long sitting gastroc stretch 3x30s            L ankle 4 way  RTB  x30 ea RTB  x30 ea RTB  x30 ea RTB  x30 ea        Levator scap stretch   30"x4 30"x4 30"x4        Upper trap stretch   30"x4 30"x4 30"x4 Ther Activity                          Stool pull/scoot   2x10 2x10 25' x 2 L ea        Swiss ball LAQ    x30 x30 R        Swiss ball tri tap   x30 x30 x30 R        [place] ball march   x30 x30 x30 R        Mirror foot movements  x30 ea x30 ea x30 ea x30 ea                     Gait Training                                       Modalities

## 2022-03-16 ENCOUNTER — OFFICE VISIT (OUTPATIENT)
Dept: PHYSICAL THERAPY | Facility: CLINIC | Age: 42
End: 2022-03-16
Payer: COMMERCIAL

## 2022-03-16 DIAGNOSIS — M79.672 LEFT FOOT PAIN: Primary | ICD-10-CM

## 2022-03-16 DIAGNOSIS — M54.2 NECK PAIN: ICD-10-CM

## 2022-03-16 PROCEDURE — 97110 THERAPEUTIC EXERCISES: CPT

## 2022-03-16 PROCEDURE — 97112 NEUROMUSCULAR REEDUCATION: CPT

## 2022-03-16 PROCEDURE — 97140 MANUAL THERAPY 1/> REGIONS: CPT

## 2022-03-16 NOTE — PROGRESS NOTES
Daily Note     Today's date: 3/16/2022  Patient name: Carlitos Ramos  : 1980  MRN: [id number]  Referring provider: Anthony Trejo MD  Dx:   Encounter Diagnosis     ICD-10-CM    1  Left foot pain  M79 672    2  Neck pain  M54 2                   Subjective: Patient reports her cervical pain has decreased  Reports foot pain continues  Objective: See treatment diary below      Assessment: Carlitos Ramos has improved cervical and foot ROM  Desensitization performed to lateral foot with towel and was tolerated  She appeared to respond well to taping foot into eversion  Monitoring of response next visit  Continue treatment  Plan: Continue per plan of care  Precautions: graves disease,   Access Code: [email or web address]  URL: [email or web address]  Date: 2022  Prepared by: Homer Rico         Manuals 2/28 3/2 3/7 3/9 3/14 3/16       gastroc stretching  KB nv           Fat pad taping for the foot      sj       IASTM gastroc (if tolerated)    10' 10' 10'       Cervical ROM   10' 10' 10' 10'       TC mobilizations             Neuro Re-Ed             Posture education x8 min            TB rows c holds for posture edu'             TB ext c holds for posture edu                                                                 Ther Ex             Pt education on HEP, POC x15 min            Bike   L1 10' L1 10' L1 10' L1 10' L1 10'       Cervical retraction 2x15 supine 10"x10 supine 10"x10 supine 10"x10 supine 10"x10 supine 10"x10       Deep neck flexor  10"x10 10"x10 10"x10 10"x10 10"x10       Cervical retraction with self over pressure x15            Cervical ROM  nv x20 ea x20 ea x20 ea x20 ea       Long sitting gastroc stretch 3x30s            L ankle 4 way  RTB  x30 ea RTB  x30 ea RTB  x30 ea RTB  x30 ea RTB  x30 ea       Levator scap stretch   30"x4 30"x4 30"x4 30"x4       Upper trap stretch   30"x4 30"x4 30"x4 30"x4                                              Ther Activity Stool pull/scoot   2x10 2x10 25' x 2 L ea 25' x 2 L ea       Swiss ball LAQ    x30 x30 R        Swiss ball tri tap   x30 x30 x30 R        [place] ball march   x30 x30 x30 R        Mirror foot movements  x30 ea x30 ea x30 ea x30 ea x30 ea                    Gait Training                                       Modalities

## 2022-03-21 ENCOUNTER — OFFICE VISIT (OUTPATIENT)
Dept: PHYSICAL THERAPY | Facility: CLINIC | Age: 42
End: 2022-03-21
Payer: COMMERCIAL

## 2022-03-21 DIAGNOSIS — M54.2 NECK PAIN: ICD-10-CM

## 2022-03-21 DIAGNOSIS — M79.672 LEFT FOOT PAIN: Primary | ICD-10-CM

## 2022-03-21 PROCEDURE — 97140 MANUAL THERAPY 1/> REGIONS: CPT

## 2022-03-21 PROCEDURE — 97110 THERAPEUTIC EXERCISES: CPT

## 2022-03-21 PROCEDURE — 97112 NEUROMUSCULAR REEDUCATION: CPT

## 2022-03-21 NOTE — PROGRESS NOTES
Daily Note     Today's date: 3/21/2022  Patient name: Keith Garcia  : 1980  MRN: [id number]  Referring provider: Ophelia Mathews MD  Dx:   Encounter Diagnosis     ICD-10-CM    1  Left foot pain  M79 672    2  Neck pain  M54 2                   Subjective: Patient reports neck is moving better but gets pinch at end range  Reports movement of foot is better but heel pain continues  Objective: See treatment diary below      Assessment: Keith Garcia continues with steady  progress towards cervical goals  Patient heel pain appears to be persistent with palpable area of firm tissue central to calcanius  she required moderate verbal cueing to complete exercises appropriate form and intensity with no tactile cues  Patient heel discomfort  should decrease with continued treatments  Plan: Continue per plan of care  Precautions: graves disease,   Access Code: [email or web address]  URL: [email or web address]  Date: 2022  Prepared by: Tammy Rossi         Manuals 2/28 3/2 3/7 3/9 3/14 3/16 3/21      gastroc stretching  KB nv           Fat pad taping for the foot      sj       IASTM gastroc (if tolerated)    10' 10' 10' 10'      Cervical ROM   10' 10' 10' 10' 10'      TC mobilizations             Neuro Re-Ed             Posture education x8 min            TB rows c holds for posture edu'             TB ext c holds for posture edu                                                                 Ther Ex             Pt education on HEP, POC x15 min            Bike   L1 10' L1 10' L1 10' L1 10' L1 10' L1 10'      Cervical retraction 2x15 supine 10"x10 supine 10"x10 supine 10"x10 supine 10"x10 supine 10"x10 supine 10"x10      Deep neck flexor  10"x10 10"x10 10"x10 10"x10 10"x10 10"x10      Cervical retraction with self over pressure x15            Cervical ROM  nv x20 ea x20 ea x20 ea x20 ea x20 ea      Long sitting gastroc stretch 3x30s            L ankle 4 way  RTB  x30 ea RTB  x30 ea RTB  x30 ea RTB  x30 ea RTB  x30 ea RTB  x30 ea      Levator scap stretch   30"x4 30"x4 30"x4 30"x4 30"x4      Upper trap stretch   30"x4 30"x4 30"x4 30"x4 30"x4                                             Ther Activity                          Stool pull/scoot   2x10 2x10 25' x 2 L ea 25' x 2 L ea 25' x 2 L ea      Swiss ball LAQ    x30 x30 R x30 R x30 R      Swiss ball tri tap   x30 x30 x30 R x30 R x30 R      Swiss ball march   x30 x30 x30 R x30 R x30 R      Mirror foot movements  x30 ea x30 ea x30 ea x30 ea x30 ea x30 ea                   Gait Training                                       Modalities

## 2022-03-23 ENCOUNTER — OFFICE VISIT (OUTPATIENT)
Dept: PHYSICAL THERAPY | Facility: CLINIC | Age: 42
End: 2022-03-23
Payer: COMMERCIAL

## 2022-03-23 DIAGNOSIS — M79.672 LEFT FOOT PAIN: Primary | ICD-10-CM

## 2022-03-23 DIAGNOSIS — M54.2 NECK PAIN: ICD-10-CM

## 2022-03-23 PROCEDURE — 97110 THERAPEUTIC EXERCISES: CPT | Performed by: PHYSICAL THERAPIST

## 2022-03-23 PROCEDURE — 97140 MANUAL THERAPY 1/> REGIONS: CPT | Performed by: PHYSICAL THERAPIST

## 2022-03-23 NOTE — PROGRESS NOTES
Daily Note     Today's date: 3/23/2022  Patient name: Sabra Bray  : 1980  MRN: [id number]  Referring provider: Lisandro Gonzalez MD  Dx:   Encounter Diagnosis     ICD-10-CM    1  Left foot pain  M79 672    2  Neck pain  M54 2                   Subjective: Nanette notes further improvement of neck pain  She is concerned over L heel pain as it is becoming isolated to central plantar calcaneus  Discussed injections with PCP  Objective: See treatment diary below      Assessment: Examined heel pain  Appears consistent with plantar fasciitis  Due to isolated area of significant tenderness, recommend she use a heel donut/heel spur cushion to reduce symptom irritability  Pt  Pain may be due to in part to altered subtalar mechanics limiting eversion/inversion causing excessive mid calcaneal strike with running Pt  Educated on findings and is agreeable to POC  Plan: Continue per plan of care  Progress treatment as tolerated  Precautions: graves disease,   Access Code: [email or web address]  URL: [email or web address]  Date: 2022  Prepared by: Christa Siegel         Manuals 2/28 3/2 3/7 3/9 3/14 3/16 3/21 3/23     gastroc stretching  KB nv           Fat pad taping for the foot      sj       IASTM gastroc (if tolerated)    10' 10' 10' 10' 10'     Cervical ROM   10' 10' 10' 10' 10' 10'     TC mobilizations             Neuro Re-Ed             Posture education x8 min            TB rows c holds for posture edu'             TB ext c holds for posture edu                                                                 Ther Ex             Pt education on HEP, POC x15 min            Bike   L1 10' L1 10' L1 10' L1 10' L1 10' L1 10' L1 10'     Cervical retraction 2x15 supine 10"x10 supine 10"x10 supine 10"x10 supine 10"x10 supine 10"x10 supine 10"x10 supine 10"x10     Deep neck flexor  10"x10 10"x10 10"x10 10"x10 10"x10 10"x10 10"x10     Cervical retraction with self over pressure x15 Cervical ROM  nv x20 ea x20 ea x20 ea x20 ea x20 ea x20 ea     Long sitting gastroc stretch 3x30s            L ankle 4 way  RTB  x30 ea RTB  x30 ea RTB  x30 ea RTB  x30 ea RTB  x30 ea RTB  x30 ea GTB  x30 ea     Levator scap stretch   30"x4 30"x4 30"x4 30"x4 30"x4 30"x4     Upper trap stretch   30"x4 30"x4 30"x4 30"x4 30"x4 30"x4                                            Ther Activity                          Stool pull/scoot   2x10 2x10 25' x 2 L ea 25' x 2 L ea 25' x 2 L ea 25' x 2 L ea     Swiss ball LAQ    x30 x30 R x30 R x30 R x30 R     Swiss ball tri tap   x30 x30 x30 R x30 R x30 R x30 R     Swiss ball march   x30 x30 x30 R x30 R x30 R x30 R     Mirror foot movements  x30 ea x30 ea x30 ea x30 ea x30 ea x30 ea x30 es                  Gait Training                                       Modalities

## 2022-03-28 ENCOUNTER — OFFICE VISIT (OUTPATIENT)
Dept: PHYSICAL THERAPY | Facility: CLINIC | Age: 42
End: 2022-03-28
Payer: COMMERCIAL

## 2022-03-28 DIAGNOSIS — M79.672 LEFT FOOT PAIN: Primary | ICD-10-CM

## 2022-03-28 DIAGNOSIS — M54.2 NECK PAIN: ICD-10-CM

## 2022-03-28 PROCEDURE — 97110 THERAPEUTIC EXERCISES: CPT

## 2022-03-28 PROCEDURE — 97140 MANUAL THERAPY 1/> REGIONS: CPT

## 2022-03-28 NOTE — PROGRESS NOTES
Daily Note     Today's date: 3/28/2022  Patient name: Jigna Mahajan  : 1980  MRN: [id number]  Referring provider: Shelbi Killian MD  Dx:   Encounter Diagnosis     ICD-10-CM    1  Left foot pain  M79 672    2  Neck pain  M54 2                   Subjective: Patient reports neck feels great heel discomfort continues  Objective: See treatment diary below      Assessment: Jigna Mahajan conitnues with steady  progress towards cervical goals  Patient heel pain appears to be decreasing slowly  she required moderate verbal cueing to complete exercises appropriate form and intensity with no tactile cues  Patient function should improve with continued treatments  Plan: Continue per plan of care  Precautions: graves disease,   Access Code: [email or web address]  URL: [email or web address]  Date: 2022  Prepared by: Alfred [place]         Manuals 2/28 3/2 3/7 3/9 3/14 3/16 3/21 3/23 3/28    gastroc stretching  KB nv           Fat pad taping for the foot      sj       IASTM gastroc (if tolerated)    10' 10' 10' 10' 10' 10'    Cervical ROM   10' 10' 10' 10' 10' 10'     TC mobilizations             Neuro Re-Ed             Posture education x8 min            TB rows c holds for posture edu'             TB ext c holds for posture edu                                                                 Ther Ex             Pt education on HEP, POC x15 min            Bike   L1 10' L1 10' L1 10' L1 10' L1 10' L1 10' L1 10' L1 10'    Cervical retraction 2x15 supine 10"x10 supine 10"x10 supine 10"x10 supine 10"x10 supine 10"x10 supine 10"x10 supine 10"x10 def    Deep neck flexor  10"x10 10"x10 10"x10 10"x10 10"x10 10"x10 10"x10 def    Cervical retraction with self over pressure x15            Cervical ROM  nv x20 ea x20 ea x20 ea x20 ea x20 ea x20 ea def    Long sitting gastroc stretch 3x30s            L ankle 4 way  RTB  x30 ea RTB  x30 ea RTB  x30 ea RTB  x30 ea RTB  x30 ea RTB  x30 ea GTB  x30 ea GTB  x30 ea    Levator scap stretch   30"x4 30"x4 30"x4 30"x4 30"x4 30"x4 def    Upper trap stretch   30"x4 30"x4 30"x4 30"x4 30"x4 30"x4 def                                           Ther Activity                          Stool pull/scoot   2x10 2x10 25' x 2 L ea 25' x 2 L ea 25' x 2 L ea 25' x 2 L ea 25' x 4 L ea    Swiss ball LAQ    x30 x30 R x30 R x30 R x30 R x30 R    Swiss ball tri tap   x30 x30 x30 R x30 R x30 R x30 R x30 R    Swiss ball march   x30 x30 x30 R x30 R x30 R x30 R x30 R    Mirror foot movements  x30 ea x30 ea x30 ea x30 ea x30 ea x30 ea x30 es x30 ea                 Gait Training                                       Modalities

## 2022-03-29 DIAGNOSIS — G47.09 OTHER INSOMNIA: ICD-10-CM

## 2022-03-29 RX ORDER — ZOLPIDEM TARTRATE 10 MG/1
10 TABLET ORAL
Qty: 30 TABLET | Refills: 0 | Status: SHIPPED | OUTPATIENT
Start: 2022-03-29 | End: 2022-04-29 | Stop reason: SDUPTHER

## 2022-03-29 NOTE — TELEPHONE ENCOUNTER
PDMP reviewed  No red flags identified; safe to proceed with prescription      PDMP Review       Value Time User    PDMP Reviewed  Yes 3/29/2022 10:42 AM Mario Alvarez MD

## 2022-03-30 ENCOUNTER — OFFICE VISIT (OUTPATIENT)
Dept: PHYSICAL THERAPY | Facility: CLINIC | Age: 42
End: 2022-03-30
Payer: COMMERCIAL

## 2022-03-30 DIAGNOSIS — M79.672 LEFT FOOT PAIN: Primary | ICD-10-CM

## 2022-03-30 DIAGNOSIS — M54.2 NECK PAIN: ICD-10-CM

## 2022-03-30 PROCEDURE — 97110 THERAPEUTIC EXERCISES: CPT

## 2022-03-30 PROCEDURE — 97140 MANUAL THERAPY 1/> REGIONS: CPT

## 2022-03-30 NOTE — PROGRESS NOTES
Daily Note     Today's date: 3/30/2022  Patient name: Ashleigh Fry  : 1980  MRN: [id number]  Referring provider: Hortensia Schultz MD  Dx:   Encounter Diagnosis     ICD-10-CM    1  Left foot pain  M79 672    2  Neck pain  M54 2                   Subjective: Patient reports has noted decreased heel pain  Objective: See treatment diary below      Assessment: Ashleigh Fry continues with steady  progress towards symptom resolution goals  Patient tender point appears to be continuing but ambulation is improved  she required moderate verbal cueing to complete exercises appropriate form and intensity with no tactile cues  Patient function should improve with continued treatments  Plan: Continue per plan of care  Precautions: graves disease,   Access Code: [email or web address]  URL: [email or web address]  Date: 2022  Prepared by: Bryan Forresters 2/28 3/2 3/7 3/9 3/14 3/16 3/21 3/23 3/28 3/30   gastroc stretching  KB nv           Fat pad taping for the foot      sj       IASTM gastroc (if tolerated)    10' 10' 10' 10' 10' 10' 10'   Cervical ROM   10' 10' 10' 10' 10' 10'     TC mobilizations             Neuro Re-Ed             Posture education x8 min            TB rows c holds for posture edu'             TB ext c holds for posture edu                                                                 Ther Ex             Pt education on HEP, POC x15 min            Bike   L1 10' L1 10' L1 10' L1 10' L1 10' L1 10' L1 10' L1 10' L1 10'   Cervical retraction 2x15 supine 10"x10 supine 10"x10 supine 10"x10 supine 10"x10 supine 10"x10 supine 10"x10 supine 10"x10 def def   Deep neck flexor  10"x10 10"x10 10"x10 10"x10 10"x10 10"x10 10"x10 def def   Cervical retraction with self over pressure x15            Cervical ROM  nv x20 ea x20 ea x20 ea x20 ea x20 ea x20 ea def def   Long sitting gastroc stretch 3x30s            L ankle 4 way  RTB  x30 ea RTB  x30 ea RTB  x30 ea RTB  x30 ea RTB  x30 ea RTB  x30 ea GTB  x30 ea GTB  x30 ea GTB  x30 ea   Levator scap stretch   30"x4 30"x4 30"x4 30"x4 30"x4 30"x4 def def   Upper trap stretch   30"x4 30"x4 30"x4 30"x4 30"x4 30"x4 def def   Sit to stand                                       Ther Activity                          Stool pull/scoot   2x10 2x10 25' x 2 L ea 25' x 2 L ea 25' x 2 L ea 25' x 2 L ea 25' x 4 L ea 25' x 4 L ea   Swiss ball LAQ    x30 x30 R x30 R x30 R x30 R x30 R x30 R   Swiss ball tri tap   x30 x30 x30 R x30 R x30 R x30 R x30 R x30 R   Swiss ball march   x30 x30 x30 R x30 R x30 R x30 R x30 R x30 R   Mirror foot movements  x30 ea x30 ea x30 ea x30 ea x30 ea x30 ea x30 es x30 ea x30 ea   Swiss ball SLB with perturbation          1'x1 R   Gait Training                                       Modalities

## 2022-04-04 ENCOUNTER — OFFICE VISIT (OUTPATIENT)
Dept: PHYSICAL THERAPY | Facility: CLINIC | Age: 42
End: 2022-04-04
Payer: COMMERCIAL

## 2022-04-04 DIAGNOSIS — M54.2 NECK PAIN: ICD-10-CM

## 2022-04-04 DIAGNOSIS — M79.672 LEFT FOOT PAIN: Primary | ICD-10-CM

## 2022-04-04 PROCEDURE — 97110 THERAPEUTIC EXERCISES: CPT

## 2022-04-04 PROCEDURE — 97140 MANUAL THERAPY 1/> REGIONS: CPT

## 2022-04-04 NOTE — PROGRESS NOTES
Daily Note     Today's date: 2022  Patient name: Daysi Campbell  : 1980  MRN: [id number]  Referring provider: Nanette Henao MD  Dx:   Encounter Diagnosis     ICD-10-CM    1  Left foot pain  M79 672    2  Neck pain  M54 2                   Subjective: Patient reports her heel is feeling better  Objective: See treatment diary below      Assessment: Daysi Campbell continues with steady  progress towards movement goals  Patient symptoms appears to be decreaed  she required moderate verbal cueing to complete exercises appropriate form and intensity with  no tactile cues  Patient function should improve with continued treatments  Plan: Continue per plan of care  Precautions: graves disease,   Access Code: [email or web address]  URL: [email or web address]  Date: 2022  Prepared by: Andriy Campbell         Manuals 4/4       3/23 3/28 3/30   gastroc stretching              Fat pad taping for the foot             IASTM gastroc (if tolerated)        10' 10' 10'   Cervical ROM        10'     TC mobilizations             Neuro Re-Ed             Posture education             TB rows c holds for posture edu'             TB ext c holds for posture edu                                                                 Ther Ex             Pt education on HEP, POC             Bike    L1 10'       L1 10' L1 10' L1 10'   Cervical retraction        supine 10"x10 def def   Deep neck flexor        10"x10 def def   Cervical retraction with self over pressure             Cervical ROM        x20 ea def def   Long sitting gastroc stretch             L ankle 4 way BRB  x30 ea       GTB  x30 ea GTB  x30 ea GTB  x30 ea   Levator scap stretch 10x10"       30"x4 def def   Upper trap stretch 10x10"       30"x4 def def   Sit to stand nv            Step stetch 30"x4            Wall stretch 30"x4                         Ther Activity                          Stool pull/scoot 25' x 4 L ea       25' x 2 L ea 25' x 4 L ea 25' x 4 L ea   Swiss ball LAQ x30 R       x30 R x30 R x30 R   Swiss ball tri tap x30 R       x30 R x30 R x30 R   Gambian ball march x30 R       x30 R x30 R x30 R   Mirror foot movements x30 ea       x30 es x30 ea x30 ea   Swiss ball SLB with perturbation 1'x1 R         1'x1 R   Gait Training                                       Modalities

## 2022-04-06 ENCOUNTER — EVALUATION (OUTPATIENT)
Dept: PHYSICAL THERAPY | Facility: CLINIC | Age: 42
End: 2022-04-06
Payer: COMMERCIAL

## 2022-04-06 DIAGNOSIS — M54.2 NECK PAIN: ICD-10-CM

## 2022-04-06 DIAGNOSIS — M79.672 LEFT FOOT PAIN: Primary | ICD-10-CM

## 2022-04-06 PROCEDURE — 97164 PT RE-EVAL EST PLAN CARE: CPT | Performed by: PHYSICAL THERAPIST

## 2022-04-06 PROCEDURE — 97110 THERAPEUTIC EXERCISES: CPT | Performed by: PHYSICAL THERAPIST

## 2022-04-06 NOTE — PROGRESS NOTES
PT Re-Evaluation     Today's date: 2022  Patient name: Mireya Rodriguez  : 1980  MRN: [id number]  Referring provider: Jim Davidson MD  Dx:   Encounter Diagnosis     ICD-10-CM    1  Left foot pain  M79 672    2  Neck pain  M54 2                   Assessment  Assessment details: Mireya Rodriguez has continued with PT for 12 total sessions, treating neck mobility restrictions and L heel pain  To this point, she is showing significant improvment in cervical AROM with subjective 90% improvement in neck pain  Primary concern has become heel pain, which she admits is approx 50% improved  She remains very upset as it limits her running tolerance  Her symptoms remain consistent with plantar fasciitis likely due to subtalar restrictions 2* chronic ankle injuries/foot pain  This mobility has also improved and we will initiate graded exercise/running program to return patient to OF  She is very motivated and feels PT will continue to address her injuries  Impairments: abnormal gait, abnormal muscle firing, abnormal muscle tone, abnormal or restricted ROM, abnormal movement, activity intolerance, impaired physical strength, lacks appropriate home exercise program, pain with function, poor posture  and poor body mechanics    Symptom irritability: moderateUnderstanding of Dx/Px/POC: good   Prognosis: good    Goals  ST  Independent with HEP in 2 weeks - met  2  Pt will have verbal report of improvement in symptoms by >/=25% in 2 weeks - met    To be achieved by D/C   LT  Pt will improve FOTO score by >/= 5 points in 6 weeks - met  2  Pt will improve FOTO score to >/= 66 by visit # 15 (foot) - not met  3  Pt will be able to have verbal reports of improved quality of sleep   4  Pt will be able to ascend/descend stairs with little difficulty   5  Pt will be able functional L rotation ROM in order to drive and check her mirrors safely without compensations   6   Pt will be able to return to recreational activities   7  Pt will be independent with symptom management        Plan  Patient would benefit from: skilled physical therapy  Planned therapy interventions: activity modification, joint mobilization, manual therapy, motor coordination training, neuromuscular re-education, patient education, self care, therapeutic activities, therapeutic exercise, graded activity, home exercise program, behavior modification, graded exercise, functional ROM exercises, strengthening and Benedict taping  Frequency: 2x week  Duration in weeks: 6  Plan of Care beginning date: 2022  Plan of Care expiration date: 2022  Treatment plan discussed with: patient        Subjective Evaluation    History of Present Illness  Mechanism of injury: Bob Parisi has continued with PT for 5 weeks, completing 12 sessions to address neck and L foot pain  She is reporting signifinat improvement in neck pain and is ready to continue with I HEP to address this  L foot pain has steadily been improving, but has definitely been up and down in intensity  Over past week, she feels she is 50% better  Her calf mm are tense due to exercise but she does not mind this  She is upset she is unable to run as she finds this is a huge stress reliever  Pain  Current pain rating: 3  At best pain ratin (neck: 0)  At worst pain ratin (neck: 3)  Quality: sharp    Patient Goals  Patient goals for therapy: decreased pain  Patient goal: be able to get back to running, be able to sleep more, be able to check her mirror,         Objective     Concurrent Complaints  Negative for night pain, disturbed sleep, dizziness, faints, headaches, nausea/motion sickness, tinnitus, trouble swallowing, difficulty breathing and shortness of breath    Additional Special Questions  Negative for nystagmus and diplopia     Static Posture     Head  Forward  Shoulders  Rounded      Postural Observations  Seated posture: fair  Standing posture: fair  Correction of posture: has no consistent effect        Neurological Testing     Sensation   Cervical/Thoracic   Left   Intact: light touch    Right   Intact: light touch    Active Range of Motion   Cervical/Thoracic Spine       Cervical  Subcranial protraction:  WFL   Subcranial retraction:   Restriction level: minimal  Flexion:  WFL  Extension:  WFL  Left lateral flexion:  WFL  Right lateral flexion:  WFL  Left rotation:  WFL  Right rotation:  WFL  Left Ankle/Foot   Dorsiflexion (ke): 10 degrees   Plantar flexion: 60 degrees   Inversion: 55 degrees   Eversion: 35 degrees     Right Ankle/Foot   Dorsiflexion (ke): 10 degrees   Plantar flexion: 60 degrees   Inversion: 55 degrees   Eversion: 35 degrees     Passive Range of Motion   Left Ankle/Foot    Dorsiflexion (ke): 15 degrees   Great toe extension: 90 degrees     Right Ankle/Foot    Dorsiflexion (ke): 15 degrees   Great toe extension: 90 degrees     Joint Play   Left Ankle/Foot  Joints within functional limits are the talocrural joint and subtalar joint  Strength/Myotome Testing   Cervical Spine     Left   Normal strength    Right   Normal strength    Additional Strength Details  Normal myotome strength B/L    Ambulation     Observational Gait   Gait: antalgic   Increased right stance time, left swing time and left step length  Decreased left stance time, right swing time and right step length     Neuro Exam:     Headaches   Patient reports headaches: No                 Manuals 4/4 4/6      3/23 3/28 3/30   gastroc stretching   3x30"           Fat pad taping for the foot  -           IASTM gastroc (if tolerated)  -      10' 10' 10'   Cervical ROM  20x      10'     TC mobilizations  -           Neuro Re-Ed             Posture education  -           TB rows c holds for posture edu'  nv           TB ext c holds for posture edu  nv                                                               Ther Ex             Pt education on HEP, POC  15' running protocol Bike    L1 10'       L1 10' L1 10' L1 10'   Cervical retraction        supine 10"x10 def def   Deep neck flexor        10"x10 def def   Cervical retraction with self over pressure             Cervical ROM        x20 ea def def   Long sitting gastroc stretch             L ankle 4 way BRB  x30 ea       GTB  x30 ea GTB  x30 ea GTB  x30 ea   Levator scap stretch 10x10"       30"x4 def def   Upper trap stretch 10x10"       30"x4 def def   Sit to stand nv            Step stetch 30"x4            Wall stretch 30"x4                         Ther Activity                          Stool pull/scoot 25' x 4 L ea nv      25' x 2 L ea 25' x 4 L ea 25' x 4 L ea   Swiss ball LAQ x30 R nv      x30 R x30 R x30 R   Swiss ball tri tap x30 R nv      x30 R x30 R x30 R   Swiss ball march x30 R nv      x30 R x30 R x30 R   Mirror foot movements x30 ea nv      x30 es x30 ea x30 ea   Swiss ball SLB with perturbation 1'x1 R nv        1'x1 R   Gait Training                                       Modalities

## 2022-04-11 ENCOUNTER — OFFICE VISIT (OUTPATIENT)
Dept: PHYSICAL THERAPY | Facility: CLINIC | Age: 42
End: 2022-04-11
Payer: COMMERCIAL

## 2022-04-11 DIAGNOSIS — M54.2 NECK PAIN: ICD-10-CM

## 2022-04-11 DIAGNOSIS — M79.672 LEFT FOOT PAIN: Primary | ICD-10-CM

## 2022-04-11 PROCEDURE — 97110 THERAPEUTIC EXERCISES: CPT

## 2022-04-11 PROCEDURE — 97112 NEUROMUSCULAR REEDUCATION: CPT

## 2022-04-11 NOTE — PROGRESS NOTES
Daily Note     Today's date: 2022  Patient name: Carmella Franco  : 1980  MRN: [id number]  Referring provider: Ashok Bah MD  Dx:   Encounter Diagnosis     ICD-10-CM    1  Left foot pain  M79 672    2  Neck pain  M54 2                   Subjective: Patient reports foot was sore after last visit but wants to continue trying  Objective: See treatment diary below      Assessment: Carmella Franco continues with some  progress towards symptom resolution goals  Patient appears to understand importance of maintaining tissue elasticity  she required moderate verbal cueing to complete exercises appropriate form and intensity with no tactile cues  Patient function should improve with continued treatments  Plan: Continue per plan of care          Manuals 4/4 4/6 4/11     3/23 3/28 3/30   gastroc stretching   3x30" 30"x3          Fat pad taping for the foot  -           IASTM gastroc (if tolerated)  -      10' 10' 10'   Cervical ROM  20x 20x     10'     TC mobilizations  -           Neuro Re-Ed             Posture education  -           TB rows c holds for posture edu'  nv GTB  2x10          TB ext c holds for posture edu  nv GTB  2x10                                                              Ther Ex             Pt education on HEP, POC  15' running protocol 15' running protocol          Bike    L1 10'       L1 10' L1 10' L1 10'   Cervical retraction        supine 10"x10 def def   Deep neck flexor        10"x10 def def   Cervical retraction with self over pressure             Cervical ROM        x20 ea def def   Long sitting gastroc stretch   30"x4          L ankle 4 way BRB  x30 ea  Ecc BKTB  x30 ea     GTB  x30 ea GTB  x30 ea GTB  x30 ea   Levator scap stretch 10x10"  30"x4     30"x4 def def   Upper trap stretch 10x10"  30"x4     30"x4 def def   Sit to stand nv            Step stetch 30"x4  30"x4          Wall stretch 30"x4  30"x4                       Ther Activity             Standing heel raises ecc             Stool pull/scoot 25' x 4 L ea nv 25' x 4 L ea     25' x 2 L ea 25' x 4 L ea 25' x 4 L ea   Swiss ball LAQ x30 R nv      x30 R x30 R x30 R   Swiss ball tri tap x30 R nv x30 R     x30 R x30 R x30 R   Swiss ball march x30 R nv x30 R     x30 R x30 R x30 R   Mirror foot movements x30 ea nv -     x30 es x30 ea x30 ea   Swiss ball SLB with perturbation 1'x1 R nv 1'x2 R       1'x1 R   Gait Training                                       Modalities

## 2022-04-13 ENCOUNTER — OFFICE VISIT (OUTPATIENT)
Dept: PHYSICAL THERAPY | Facility: CLINIC | Age: 42
End: 2022-04-13
Payer: COMMERCIAL

## 2022-04-13 DIAGNOSIS — M79.672 LEFT FOOT PAIN: Primary | ICD-10-CM

## 2022-04-13 DIAGNOSIS — M54.2 NECK PAIN: ICD-10-CM

## 2022-04-13 PROCEDURE — 97110 THERAPEUTIC EXERCISES: CPT

## 2022-04-13 PROCEDURE — 97112 NEUROMUSCULAR REEDUCATION: CPT

## 2022-04-13 NOTE — PROGRESS NOTES
Daily Note     Today's date: 2022  Patient name: Taryn Foster  : 1980  MRN: [id number]  Referring provider: Shagufta Diane MD  Dx:   Encounter Diagnosis     ICD-10-CM    1  Left foot pain  M79 672    2  Neck pain  M54 2                   Subjective: Patient reports foot pain continues but tenderness to palpation continues  Objective: See treatment diary below      Assessment: Taryn Foster continues with steady progress towards movement goals  Patient ambulation without pain appears to be improved  she required moderate verbal cueing to complete exercises appropriate form and intensity with no tactile cues  Patient symptoms should resolve with continued treatments  Plan: Continue per plan of care          Manuals 4/4 4/6 4/11 4/13    3/23 3/28 3/30   gastroc stretching   3x30" 30"x3 30"x4         Fat pad taping for the foot  -           IASTM gastroc (if tolerated)  -      10' 10' 10'   Cervical ROM  20x 20x 20x    10'     TC mobilizations  -           Neuro Re-Ed             Posture education  -           TB rows c holds for posture edu'  nv GTB  2x10 GTB  2x10         TB ext c holds for posture edu  nv GTB  2x10 GTB  2x10                                                             Ther Ex             Pt education on HEP, POC  15' running protocol 15' running protocol 15' running protocol         Bike    L1 10'       L1 10' L1 10' L1 10'   Cervical retraction        supine 10"x10 def def   Deep neck flexor        10"x10 def def   Cervical retraction with self over pressure             Cervical ROM        x20 ea def def   Long sitting gastroc stretch   30"x4 30"x4         L ankle 4 way BRB  x30 ea  Ecc BKTB  x30 ea Ecc BKTB  x30    GTB  x30 ea GTB  x30 ea GTB  x30 ea   Levator scap stretch 10x10"  30"x4 30"x4    30"x4 def def   Upper trap stretch 10x10"  30"x4 30"x4    30"x4 def def   Sit to stand nv            Step stetch 30"x4  30"x4 30"x4         Wall stretch 30"x4  30"x4 30"x4 Ther Activity             Standing heel raises ecc             Stool pull/scoot 25' x 4 L ea nv 25' x 4 L ea 25' x 6 L ea    25' x 2 L ea 25' x 4 L ea 25' x 4 L ea   Swiss ball LAQ x30 R nv      x30 R x30 R x30 R   Swiss ball tri tap x30 R nv x30 R x30 r    x30 R x30 R x30 R   Swiss ball march x30 R nv x30 R x30 r    x30 R x30 R x30 R   Mirror foot movements x30 ea nv -     x30 es x30 ea x30 ea   Swiss ball SLB with perturbation 1'x1 R nv 1'x2 R 1'x2 r      1'x1 R   Gait Training                                       Modalities

## 2022-04-18 ENCOUNTER — OFFICE VISIT (OUTPATIENT)
Dept: PHYSICAL THERAPY | Facility: CLINIC | Age: 42
End: 2022-04-18
Payer: COMMERCIAL

## 2022-04-18 DIAGNOSIS — M79.672 LEFT FOOT PAIN: ICD-10-CM

## 2022-04-18 DIAGNOSIS — M54.2 NECK PAIN: Primary | ICD-10-CM

## 2022-04-18 PROCEDURE — 97112 NEUROMUSCULAR REEDUCATION: CPT

## 2022-04-18 PROCEDURE — 97110 THERAPEUTIC EXERCISES: CPT

## 2022-04-18 NOTE — PROGRESS NOTES
Daily Note     Today's date: 2022  Patient name: David Esparza  : 1980  MRN: [id number]  Referring provider: Devin Durbin MD  Dx:   Encounter Diagnosis     ICD-10-CM    1  Neck pain  M54 2    2  Left foot pain  M79 672                   Subjective: Patient reports running program continues at home but today she has a headache so she would rather bike  Objective: See treatment diary below      Assessment: David Esparza continues with steady  progress towards movement goals  Patient weight bearing appears to be improved  she required moderate verbal cueing to complete exercises appropriate form and intensity with no tactile cues  Patient gait should normalize with continued treatments  Plan: Continue per plan of care          Manuals 4/4 4/6 4/11 4/13 4/18   3/23 3/28 3/30   gastroc stretching   3x30" 30"x3 30"x4 30"x4        Fat pad taping for the foot  -           IASTM gastroc (if tolerated)  -      10' 10' 10'   Cervical ROM  20x 20x 20x 20x   10'     TC mobilizations  -           Neuro Re-Ed             Posture education  -           TB rows c holds for posture edu'  nv GTB  2x10 GTB  2x10 BTB  2x10        TB ext c holds for posture edu  nv GTB  2x10 GTB  2x10 BTB  2x10                                                            Ther Ex             Pt education on HEP, POC  15' running protocol 15' running protocol 15' running protocol 15' running protocol        Bike    L1 10'       L1 10' L1 10' L1 10'   Cervical retraction        supine 10"x10 def def   Deep neck flexor        10"x10 def def   Cervical retraction with self over pressure             Cervical ROM        x20 ea def def   Long sitting gastroc stretch   30"x4 30"x4 30"x4        L ankle 4 way BRB  x30 ea  Ecc BKTB  x30 ea Ecc BKTB  x30 Ecc BKTB  x30   GTB  x30 ea GTB  x30 ea GTB  x30 ea   Levator scap stretch 10x10"  30"x4 30"x4 30"x4   30"x4 def def   Upper trap stretch 10x10"  30"x4 30"x4 30"x4   30"x4 def def Sit to stand nv    Squats  x20        Step stetch 30"x4  30"x4 30"x4 30"x4        Wall stretch 30"x4  30"x4 30"x4 30"x4                     Ther Activity             Standing heel raises ecc             Stool pull/scoot 25' x 4 L ea nv 25' x 4 L ea 25' x 6 L ea 25' x 6 L ea   25' x 2 L ea 25' x 4 L ea 25' x 4 L ea   Swiss ball LAQ x30 R nv      x30 R x30 R x30 R   Swiss ball tri tap x30 R nv x30 R x30 r x30 r   x30 R x30 R x30 R   Swiss ball march x30 R nv x30 R x30 r x30 r   x30 R x30 R x30 R   Mirror foot movements x30 ea nv -     x30 es x30 ea x30 ea   Swiss ball SLB with perturbation 1'x1 R nv 1'x2 R 1'x2 r 1'x2 r     1'x1 R   Gait Training                                       Modalities

## 2022-04-20 ENCOUNTER — OFFICE VISIT (OUTPATIENT)
Dept: PHYSICAL THERAPY | Facility: CLINIC | Age: 42
End: 2022-04-20
Payer: COMMERCIAL

## 2022-04-20 DIAGNOSIS — M79.672 LEFT FOOT PAIN: ICD-10-CM

## 2022-04-20 DIAGNOSIS — M54.2 NECK PAIN: Primary | ICD-10-CM

## 2022-04-20 PROCEDURE — 97110 THERAPEUTIC EXERCISES: CPT

## 2022-04-20 PROCEDURE — 97112 NEUROMUSCULAR REEDUCATION: CPT

## 2022-04-20 NOTE — PROGRESS NOTES
Daily Note     Today's date: 2022  Patient name: Rannie Kawasaki  : 1980  MRN: [id number]  Referring provider: Kathe Crigler, MD  Dx:   Encounter Diagnosis     ICD-10-CM    1  Neck pain  M54 2    2  Left foot pain  M79 672                   Subjective: Patient reports foot is feeling better  Reports has to take next week off because of work schedule  Objective: See treatment diary below      Assessment: Rannie Kawasaki continues with steady  progress towards ankle stability goals  Patient discomfort appears to be decreasing  she required moderate verbal cueing to complete exercises appropriate form and intensity with no tactile cues  Patient functional ankle movement should improve with continued treatments  Plan: Continue per plan of care          Manuals        gastroc stretching   3x30" 30"x3 30"x4 30"x4 30"x4       Fat pad taping for the foot  -           IASTM gastroc (if tolerated)  -           Cervical ROM  20x 20x 20x 20x 20x       TC mobilizations  -           Neuro Re-Ed             Posture education  -           TB rows c holds for posture edu'  nv GTB  2x10 GTB  2x10 BTB  2x10 BTB  2x10       TB ext c holds for posture edu  nv GTB  2x10 GTB  2x10 BTB  2x10 BTB  2x10                                                           Ther Ex             Pt education on HEP, POC  15' running protocol 15' running protocol 15' running protocol 15' running protocol 15' running protocol       Bike    L1 10'            Cervical retraction             Deep neck flexor             Cervical retraction with self over pressure             Cervical ROM             Long sitting gastroc stretch   30"x4 30"x4 30"x4 30"x4       L ankle 4 way BRB  x30 ea  Ecc BKTB  x30 ea Ecc BKTB  x30 Ecc BKTB  x30 Ecc BKTB  x30       Levator scap stretch 10x10"  30"x4 30"x4 30"x4 HEP       Upper trap stretch 10x10"  30"x4 30"x4 30"x4 HEP       Heel raise toe raises      x30       AGUSTIN galvin 2x10       Lateral step downs      4" 2x10       Clock traps      x20       Sit to stand nv    Squats  x20 Squats  x20       Step stetch 30"x4  30"x4 30"x4 30"x4 30"x4       Wall stretch 30"x4  30"x4 30"x4 30"x4 30"x4                    Ther Activity             Standing heel raises ecc             Stool pull/scoot 25' x 4 L ea nv 25' x 4 L ea 25' x 6 L ea 25' x 6 L ea 25' x 6 L ea       Swiss ball LAQ x30 R nv           Swiss ball tri tap x30 R nv x30 R x30 r x30 r x30 r       Swiss ball march x30 R nv x30 R x30 r x30 r x30 r       Mirror foot movements x30 ea nv -          Montenegrin ball SLB with perturbation 1'x1 R nv 1'x2 R 1'x2 r 1'x2 r 1'x2 r       Gait Training                                       Modalities

## 2022-04-25 ENCOUNTER — APPOINTMENT (OUTPATIENT)
Dept: PHYSICAL THERAPY | Facility: CLINIC | Age: 42
End: 2022-04-25
Payer: COMMERCIAL

## 2022-04-27 ENCOUNTER — APPOINTMENT (OUTPATIENT)
Dept: PHYSICAL THERAPY | Facility: CLINIC | Age: 42
End: 2022-04-27
Payer: COMMERCIAL

## 2022-04-29 DIAGNOSIS — G47.09 OTHER INSOMNIA: ICD-10-CM

## 2022-04-29 RX ORDER — ZOLPIDEM TARTRATE 10 MG/1
10 TABLET ORAL
Qty: 30 TABLET | Refills: 0 | Status: SHIPPED | OUTPATIENT
Start: 2022-04-29 | End: 2022-05-30 | Stop reason: SDUPTHER

## 2022-04-29 NOTE — TELEPHONE ENCOUNTER
PDMP reviewed  No red flags identified; safe to proceed with prescription      PDMP Review       Value Time User    PDMP Reviewed  Yes 4/29/2022 12:00 PM Kendy Ramirez MD

## 2022-05-17 ENCOUNTER — OFFICE VISIT (OUTPATIENT)
Dept: FAMILY MEDICINE CLINIC | Facility: CLINIC | Age: 42
End: 2022-05-17
Payer: COMMERCIAL

## 2022-05-17 ENCOUNTER — APPOINTMENT (OUTPATIENT)
Dept: LAB | Facility: CLINIC | Age: 42
End: 2022-05-17
Payer: COMMERCIAL

## 2022-05-17 VITALS
HEIGHT: 71 IN | HEART RATE: 77 BPM | OXYGEN SATURATION: 98 % | TEMPERATURE: 98 F | SYSTOLIC BLOOD PRESSURE: 116 MMHG | WEIGHT: 175 LBS | DIASTOLIC BLOOD PRESSURE: 62 MMHG | BODY MASS INDEX: 24.5 KG/M2

## 2022-05-17 DIAGNOSIS — E04.2 MULTIPLE THYROID NODULES: Primary | ICD-10-CM

## 2022-05-17 DIAGNOSIS — E05.00 GRAVES' DISEASE: ICD-10-CM

## 2022-05-17 DIAGNOSIS — G47.09 OTHER INSOMNIA: ICD-10-CM

## 2022-05-17 DIAGNOSIS — D50.0 IRON DEFICIENCY ANEMIA DUE TO CHRONIC BLOOD LOSS: ICD-10-CM

## 2022-05-17 DIAGNOSIS — R68.82 DECREASED LIBIDO: ICD-10-CM

## 2022-05-17 LAB
T4 FREE SERPL-MCNC: 2.52 NG/DL (ref 0.76–1.46)
TSH SERPL DL<=0.05 MIU/L-ACNC: <0.007 UIU/ML (ref 0.45–4.5)

## 2022-05-17 PROCEDURE — 3008F BODY MASS INDEX DOCD: CPT | Performed by: INTERNAL MEDICINE

## 2022-05-17 PROCEDURE — 84439 ASSAY OF FREE THYROXINE: CPT

## 2022-05-17 PROCEDURE — 36415 COLL VENOUS BLD VENIPUNCTURE: CPT

## 2022-05-17 PROCEDURE — 1036F TOBACCO NON-USER: CPT | Performed by: INTERNAL MEDICINE

## 2022-05-17 PROCEDURE — 84443 ASSAY THYROID STIM HORMONE: CPT

## 2022-05-17 PROCEDURE — 99214 OFFICE O/P EST MOD 30 MIN: CPT | Performed by: INTERNAL MEDICINE

## 2022-05-17 NOTE — PROGRESS NOTES
Assessment/Plan:    Graves' disease  Remains on methimazole twice a week  Will check TSH  Iron deficiency anemia due to chronic blood loss  Normal iron studies  Her periods have decreased  Continue to take iron twice a week  Decreased libido  I didn't have anything new to offer at this time  Other insomnia  Remains on Ambien  It doesn't help her sleep through the night but she can't sleep at all if she doesn't take it  Chief Complaint     Follow-up          Patient ID: Bob Parisi is a 42 y o  female who returns for routine follow up  She has been working out a lot and has been able to lose 8 lbs  She did a lot of work this weekend and she had some leg edema which has resolved  She has had improvement in her neck pain and foot pain since going to physical therapy  Her chronic problem with libido continues and she did see the gynecologist who did not have anything specific to recommend (the note does mention couples sex therapy)  Objective:    /62 (BP Location: Left arm, Patient Position: Sitting, Cuff Size: Standard)   Pulse 77   Temp 98 °F (36 7 °C)   Ht 5' 11" (1 803 m)   Wt 79 4 kg (175 lb)   SpO2 98%   BMI 24 41 kg/m²     Wt Readings from Last 3 Encounters:   05/17/22 79 4 kg (175 lb)   02/21/22 83 3 kg (183 lb 9 6 oz)   12/09/21 81 6 kg (180 lb)         Physical Exam    General:  Well-developed, well-nourished, in no acute distress  Neck:  No thyromegaly or thyroid nodules  Cardiac:  Regular rate and rhythm, no murmur, gallop, or rub  There is no JVD or HJR  Lungs:  Clear to auscultation and percussion  Abdomen:  Soft, nontender, normoactive bowel sounds, no palpable masses, no hepatosplenomegaly  Extremities:  No clubbing, cyanosis, or edema

## 2022-05-17 NOTE — ASSESSMENT & PLAN NOTE
Remains on Ambien  It doesn't help her sleep through the night but she can't sleep at all if she doesn't take it

## 2022-05-30 DIAGNOSIS — G47.09 OTHER INSOMNIA: ICD-10-CM

## 2022-05-31 RX ORDER — ZOLPIDEM TARTRATE 10 MG/1
10 TABLET ORAL
Qty: 30 TABLET | Refills: 0 | Status: SHIPPED | OUTPATIENT
Start: 2022-05-31 | End: 2022-06-30

## 2022-05-31 NOTE — TELEPHONE ENCOUNTER
PDMP reviewed  No red flags identified; safe to proceed with prescription      PDMP Review       Value Time User    PDMP Reviewed  Yes 5/31/2022  7:49 AM Satya Salinas MD

## 2022-06-13 NOTE — PROGRESS NOTES
Discharge Summary:  Jennifer Huang contacted PT to reports she is feeling good with both neck and left foot  She is going to manage any symptoms with HEP  She is self discharging      Antonio Dumont PTA

## 2022-06-29 DIAGNOSIS — G47.09 OTHER INSOMNIA: ICD-10-CM

## 2022-06-30 RX ORDER — ZOLPIDEM TARTRATE 10 MG/1
TABLET ORAL
Qty: 30 TABLET | Refills: 0 | Status: SHIPPED | OUTPATIENT
Start: 2022-06-30 | End: 2022-07-28 | Stop reason: SDUPTHER

## 2022-06-30 NOTE — TELEPHONE ENCOUNTER
PDMP reviewed  No red flags identified; safe to proceed with prescription      PDMP Review       Value Time User    PDMP Reviewed  Yes 6/30/2022  8:40 AM Grisel Urbina MD

## 2022-07-06 ENCOUNTER — HOSPITAL ENCOUNTER (OUTPATIENT)
Dept: MRI IMAGING | Facility: HOSPITAL | Age: 42
Discharge: HOME | End: 2022-07-06
Attending: INTERNAL MEDICINE
Payer: COMMERCIAL

## 2022-07-06 DIAGNOSIS — M79.672 FOOT PAIN, LEFT: ICD-10-CM

## 2022-07-06 PROCEDURE — 73721 MRI JNT OF LWR EXTRE W/O DYE: CPT

## 2022-07-06 PROCEDURE — G1004 CDSM NDSC: HCPCS

## 2022-07-28 DIAGNOSIS — G47.09 OTHER INSOMNIA: ICD-10-CM

## 2022-07-28 RX ORDER — ZOLPIDEM TARTRATE 10 MG/1
10 TABLET ORAL
Qty: 30 TABLET | Refills: 0 | Status: SHIPPED | OUTPATIENT
Start: 2022-07-28 | End: 2022-08-29 | Stop reason: SDUPTHER

## 2022-07-28 NOTE — TELEPHONE ENCOUNTER
PDMP reviewed  No red flags identified; safe to proceed with prescription      PDMP Review       Value Time User    PDMP Reviewed  Yes 7/28/2022  1:58 PM Rambo Ruano MD

## 2022-08-09 ENCOUNTER — PATIENT MESSAGE (OUTPATIENT)
Dept: FAMILY MEDICINE CLINIC | Facility: CLINIC | Age: 42
End: 2022-08-09

## 2022-08-09 DIAGNOSIS — B37.31 VAGINA, CANDIDIASIS: Primary | ICD-10-CM

## 2022-08-10 RX ORDER — FLUCONAZOLE 150 MG/1
150 TABLET ORAL ONCE
Qty: 1 TABLET | Refills: 0 | Status: SHIPPED | OUTPATIENT
Start: 2022-08-10 | End: 2022-08-10

## 2022-08-10 NOTE — TELEPHONE ENCOUNTER
Jayme Acuna MA 8/9/2022 7:55 PM EDT      ----- Message -----  From: Coby Mancini  Sent: 8/9/2022 5:11 PM EDT  To: [facility]  Subject: Medication Request     Hello again,  Could you please send fluconazole in for me? I am having girl problems  TY!

## 2022-08-29 DIAGNOSIS — G47.09 OTHER INSOMNIA: ICD-10-CM

## 2022-08-29 RX ORDER — ZOLPIDEM TARTRATE 10 MG/1
10 TABLET ORAL
Qty: 30 TABLET | Refills: 0 | Status: SHIPPED | OUTPATIENT
Start: 2022-08-29 | End: 2022-09-29

## 2022-08-29 NOTE — TELEPHONE ENCOUNTER
PDMP reviewed  No red flags identified; safe to proceed with prescription      PDMP Review       Value Time User    PDMP Reviewed  Yes 8/29/2022  2:31 PM Tal Guzman MD

## 2022-09-14 ENCOUNTER — TELEPHONE (OUTPATIENT)
Dept: FAMILY MEDICINE CLINIC | Facility: CLINIC | Age: 42
End: 2022-09-14

## 2022-09-28 DIAGNOSIS — G47.09 OTHER INSOMNIA: ICD-10-CM

## 2022-09-29 RX ORDER — ZOLPIDEM TARTRATE 10 MG/1
TABLET ORAL
Qty: 30 TABLET | Refills: 0 | Status: SHIPPED | OUTPATIENT
Start: 2022-09-29 | End: 2022-10-28 | Stop reason: SDUPTHER

## 2022-09-29 NOTE — TELEPHONE ENCOUNTER
PDMP reviewed  No red flags identified; safe to proceed with prescription      PDMP Review       Value Time User    PDMP Reviewed  Yes 9/29/2022  8:34 AM Rambo Ruano MD

## 2022-10-28 DIAGNOSIS — G47.09 OTHER INSOMNIA: ICD-10-CM

## 2022-10-28 DIAGNOSIS — E05.00 GRAVES' DISEASE: ICD-10-CM

## 2022-10-28 RX ORDER — ZOLPIDEM TARTRATE 10 MG/1
10 TABLET ORAL
Qty: 30 TABLET | Refills: 0 | Status: SHIPPED | OUTPATIENT
Start: 2022-10-28

## 2022-10-28 RX ORDER — METHIMAZOLE 5 MG/1
TABLET ORAL
Qty: 90 TABLET | Refills: 0 | Status: SHIPPED | OUTPATIENT
Start: 2022-10-28

## 2022-10-28 NOTE — TELEPHONE ENCOUNTER
PDMP reviewed  No red flags identified; safe to proceed with prescription      PDMP Review       Value Time User    PDMP Reviewed  Yes 10/28/2022  9:30 AM Bishop Cecilia MD

## 2022-11-15 ENCOUNTER — APPOINTMENT (OUTPATIENT)
Dept: LAB | Facility: CLINIC | Age: 42
End: 2022-11-15

## 2022-11-15 ENCOUNTER — OFFICE VISIT (OUTPATIENT)
Dept: FAMILY MEDICINE CLINIC | Facility: CLINIC | Age: 42
End: 2022-11-15

## 2022-11-15 VITALS
OXYGEN SATURATION: 99 % | TEMPERATURE: 97.5 F | BODY MASS INDEX: 22.65 KG/M2 | HEIGHT: 71 IN | SYSTOLIC BLOOD PRESSURE: 122 MMHG | DIASTOLIC BLOOD PRESSURE: 78 MMHG | WEIGHT: 161.8 LBS | HEART RATE: 113 BPM

## 2022-11-15 DIAGNOSIS — D17.1 LIPOMA OF BACK: ICD-10-CM

## 2022-11-15 DIAGNOSIS — E05.00 GRAVES' DISEASE: ICD-10-CM

## 2022-11-15 DIAGNOSIS — D17.79 LIPOMA OF OTHER SPECIFIED SITES: Primary | ICD-10-CM

## 2022-11-15 DIAGNOSIS — R68.82 DECREASED LIBIDO: ICD-10-CM

## 2022-11-15 LAB
T4 FREE SERPL-MCNC: 2.57 NG/DL (ref 0.76–1.46)
TSH SERPL DL<=0.05 MIU/L-ACNC: <0.007 UIU/ML (ref 0.45–4.5)

## 2022-11-15 NOTE — ASSESSMENT & PLAN NOTE
Given her weight loss, tremors, and sweats I would suspect that she has markedly hyperthyroid  We will set her up for a TSH and free T4 today  We may need to increase her methimazole but will do so after we have the results

## 2022-11-15 NOTE — ASSESSMENT & PLAN NOTE
This may be related to being hyperthyroid  We will see how she responds to having her thyroid levels normalized

## 2022-11-15 NOTE — PROGRESS NOTES
Assessment/Plan:    Graves' disease  Given her weight loss, tremors, and sweats I would suspect that she has markedly hyperthyroid  We will set her up for a TSH and free T4 today  We may need to increase her methimazole but will do so after we have the results  Decreased libido  This may be related to being hyperthyroid  We will see how she responds to having her thyroid levels normalized  Lipoma of back  It is unusual for lipomas to be this painful  I am going to get an opinion from a surgeon to see whether one or more the should be resected  She is concerned about whether not this represents Dercum's disease  She does have a relative who had painful lipomas but there is no actual diagnosis of this condition  We did discuss the possibility of starting her on medication to deal with the mood instability associated with her periods  She declined this at this time  She feels she has good support structure  Chief Complaint     lumps; Pain; Tremors; Insomnia; Weight Loss          Patient ID: Janak Lester is a 36 y o  female who returns for routine follow up  She has painful lumps  She now has one on the left trapezius region and she has two in the lumbar region as well as one in the right inguinal region  They are very painful  She still has the lumps on the plantar aspect of her left heel  She is having tremors, sweats, and has lost 22 lbs according to our scale  She is taking the methimazole 5 mg three days per week  She mention that she has had some suicidal ideation around the time of her menstrual period for a day or two for the last few cycles  She has no intent of hurting herself but wanted to let me to this phenomenon  She is also still struggling with decreased libido and loss of sensation with intimate contact        Objective:    /78 (BP Location: Right arm, Patient Position: Sitting, Cuff Size: Standard)   Pulse (!) 113   Temp 97 5 °F (36 4 °C)   Ht 5' 11" (1 803 m) Wt 73 4 kg (161 lb 12 8 oz)   SpO2 99%   BMI 22 57 kg/m²     Wt Readings from Last 3 Encounters:   11/15/22 73 4 kg (161 lb 12 8 oz)   05/17/22 79 4 kg (175 lb)   02/21/22 83 3 kg (183 lb 9 6 oz)         Physical Exam    General:  Well-developed, well-nourished, in no acute distress  Neck:  No goiter or thyroid nodule appreciated today  Skin:  She has freely mobile lipomatous nodules in the sacral region bilaterally, left upper trapezius, right inguinal region, and also on the plantar aspect of the left heel  They are all tender

## 2022-11-15 NOTE — ASSESSMENT & PLAN NOTE
It is unusual for lipomas to be this painful  I am going to get an opinion from a surgeon to see whether one or more the should be resected  She is concerned about whether not this represents Dercum's disease  She does have a relative who had painful lipomas but there is no actual diagnosis of this condition

## 2022-11-16 DIAGNOSIS — E05.00 GRAVES' DISEASE: Primary | ICD-10-CM

## 2022-11-28 ENCOUNTER — OFFICE VISIT (OUTPATIENT)
Dept: URGENT CARE | Facility: CLINIC | Age: 42
End: 2022-11-28

## 2022-11-28 VITALS
RESPIRATION RATE: 18 BRPM | HEIGHT: 71 IN | WEIGHT: 161 LBS | SYSTOLIC BLOOD PRESSURE: 134 MMHG | HEART RATE: 100 BPM | BODY MASS INDEX: 22.54 KG/M2 | OXYGEN SATURATION: 99 % | TEMPERATURE: 96.4 F | DIASTOLIC BLOOD PRESSURE: 83 MMHG

## 2022-11-28 DIAGNOSIS — H60.332 ACUTE SWIMMER'S EAR OF LEFT SIDE: ICD-10-CM

## 2022-11-28 DIAGNOSIS — E05.00 GRAVES' DISEASE: ICD-10-CM

## 2022-11-28 DIAGNOSIS — G47.09 OTHER INSOMNIA: ICD-10-CM

## 2022-11-28 DIAGNOSIS — H65.192 ACUTE NONSUPPURATIVE OTITIS MEDIA, LEFT: Primary | ICD-10-CM

## 2022-11-28 RX ORDER — NEOMYCIN SULFATE, POLYMYXIN B SULFATE AND HYDROCORTISONE 10; 3.5; 1 MG/ML; MG/ML; [USP'U]/ML
4 SUSPENSION/ DROPS AURICULAR (OTIC) 4 TIMES DAILY
Qty: 10 ML | Refills: 0 | Status: SHIPPED | OUTPATIENT
Start: 2022-11-28 | End: 2022-12-05

## 2022-11-28 RX ORDER — ZOLPIDEM TARTRATE 10 MG/1
10 TABLET ORAL
Qty: 30 TABLET | Refills: 0 | Status: SHIPPED | OUTPATIENT
Start: 2022-11-28

## 2022-11-28 RX ORDER — METHIMAZOLE 5 MG/1
5 TABLET ORAL DAILY
Qty: 90 TABLET | Refills: 1 | Status: SHIPPED | OUTPATIENT
Start: 2022-11-28 | End: 2022-11-28

## 2022-11-28 RX ORDER — PREDNISONE 10 MG/1
TABLET ORAL
Qty: 27 TABLET | Refills: 0 | Status: SHIPPED | OUTPATIENT
Start: 2022-11-28

## 2022-11-28 NOTE — PATIENT INSTRUCTIONS
Otitis Externa   WHAT YOU NEED TO KNOW:   Swimmer's ear, also called otitis externa, is an infection in the outer ear canal  This canal goes from the outside of your ear to your eardrum  Swimmer's ear most often occurs when water remains in your ear after you swim  This creates a moist area for bacteria to grow  Scratches or damage from your fingers, cotton swabs, or other objects can also cause swimmer's ear  DISCHARGE INSTRUCTIONS:   Return to the emergency department if:   You have severe ear pain  You are suddenly not able to hear  You have new swelling in your face, behind your ears, or in your neck  You suddenly cannot move part of your face, or it feels numb  Call your doctor if:   You have a fever  Your symptoms get worse or do not go away, even after treatment  You have questions or concerns about your condition or care  Treatment for swimmer's ear  may include cleaning your outer ear canal first  This will help clean any ear wax, flaky skin, or other discharge  You may then need any of the following:  Medicines:      Ear drops  help fight infection and decrease redness and swelling  Acetaminophen  decreases pain and fever  It is available without a doctor's order  Ask how much to take and how often to take it  Follow directions  Read the labels of all other medicines you are using to see if they also contain acetaminophen, or ask your doctor or pharmacist  Acetaminophen can cause liver damage if not taken correctly  Do not use more than 4 grams (4,000 milligrams) total of acetaminophen in one day  NSAIDs , such as ibuprofen, help decrease swelling, pain, and fever  This medicine is available with or without a doctor's order  NSAIDs can cause stomach bleeding or kidney problems in certain people  If you take blood thinner medicine, always ask your healthcare provider if NSAIDs are safe for you  Always read the medicine label and follow directions      An ear wick  may be used if your ear canal is blocked  A wick (small tube) made of cotton or gauze is placed in your ear  The wick helps pull extra fluid out of your ear canal  Liliana also help draw medicine into your ear canal     How to use ear drops:   Warm the bottle of ear drops in your hands  for a few minutes  Lie down on your side with your infected ear facing up  This will help the medicine travel completely through your ear canal     Gently pull the ear up and back  Carefully drip the correct number of ear drops into your ear  Have another person help you if possible  For children younger than 3 years,  gently pull and hold the ear down and back  For children older than 3 years,  gently pull and hold the ear up and back  Stay in the same position  for 3 to 5 minutes to let the medicine soak in  Prevent swimmer's ear:   Dry your ears completely after you swim or bathe  Gently wipe your outer ear with a soft towel or cloth  Use ear plugs when you swim  Do not put cotton swabs or other objects in your ears  These can scratch or damage your ear  They can also push ear wax deeper in and irritate your ear  Put cotton balls gently in your outer ear  when you apply hair spray, hair dye, or perfume  Follow up with your doctor as directed:  Write down your questions so you remember to ask them during your visits  © Copyright [employer] 2022 Information is for End User's use only and may not be sold, redistributed or otherwise used for commercial purposes  All illustrations and images included in CareNotes® are the copyrighted property of A D A M , Inc  or [address] [place], [place] [address]sandra   The above information is an  only  It is not intended as medical advice for individual conditions or treatments  Talk to your doctor, nurse or pharmacist before following any medical regimen to see if it is safe and effective for you        Fluid In The Ear (Serous Otitis Media)   WHAT YOU NEED TO KNOW: DALTON is fluid trapped in the middle of your ear behind your eardrum  This condition usually develops without signs or symptoms of an ear infection  Serous otitis media may be caused by an upper respiratory infection or allergies  It is most common in the fall and early spring  DISCHARGE INSTRUCTIONS:   Call your doctor if:   You develop severe ear pain  The outside of your ear is red or swollen  You have fluid coming from your ear  You have questions or concerns about your condition or care  How to stay healthy:   Wash your hands often throughout the day  Use soap and water  Rub your soapy hands together, lacing your fingers, for at least 20 seconds  Rinse with warm, running water  Dry your hands with a clean towel or paper towel  Use hand  that contains alcohol if soap and water are not available  Teach children how to wash their hands and use hand   Avoid people who are sick  Some germs are easily and quickly spread through contact  Follow up with your doctor as directed:  Write down your questions so you remember to ask them during your visits  © Copyright [employer] 2022 Information is for End User's use only and may not be sold, redistributed or otherwise used for commercial purposes  All illustrations and images included in CareNotes® are the copyrighted property of A D A M , Inc  or [employer]   The above information is an  only  It is not intended as medical advice for individual conditions or treatments  Talk to your doctor, nurse or pharmacist before following any medical regimen to see if it is safe and effective for you

## 2022-11-28 NOTE — TELEPHONE ENCOUNTER
PDMP reviewed  No red flags identified; safe to proceed with prescription      PDMP Review       Value Time User    PDMP Reviewed  Yes 11/28/2022 10:31 AM Aleida eRed MD

## 2022-11-28 NOTE — PROGRESS NOTES
[facility]        NAME: Vladimir Giron is a 36 y o  female  : 1980    MRN: [id number]  DATE: 2022  TIME: 4:23 PM    Assessment and Plan   Acute nonsuppurative otitis media, left [H65 192]  1  Acute nonsuppurative otitis media, left              Patient Instructions     Patient Instructions     Fluid In The Ear (Serous Otitis Media)   WHAT YOU NEED TO KNOW:   DALTON is fluid trapped in the middle of your ear behind your eardrum  This condition usually develops without signs or symptoms of an ear infection  Serous otitis media may be caused by an upper respiratory infection or allergies  It is most common in the fall and early spring  DISCHARGE INSTRUCTIONS:   Call your doctor if:   · You develop severe ear pain  · The outside of your ear is red or swollen  · You have fluid coming from your ear  · You have questions or concerns about your condition or care  How to stay healthy:   · Wash your hands often throughout the day  Use soap and water  Rub your soapy hands together, lacing your fingers, for at least 20 seconds  Rinse with warm, running water  Dry your hands with a clean towel or paper towel  Use hand  that contains alcohol if soap and water are not available  Teach children how to wash their hands and use hand   · Avoid people who are sick  Some germs are easily and quickly spread through contact  Follow up with your doctor as directed:  Write down your questions so you remember to ask them during your visits  © Copyright [employer]  Information is for End User's use only and may not be sold, redistributed or otherwise used for commercial purposes  All illustrations and images included in CareNotes® are the copyrighted property of [employer] A M , Inc  or [facility] [employer]   The above information is an  only  It is not intended as medical advice for individual conditions or treatments   Talk to your doctor, nurse or pharmacist before following any medical regimen to see if it is safe and effective for you  Follow up with PCP in 3-5 days  Proceed to  ER if symptoms worsen  Chief Complaint     Chief Complaint   Patient presents with   • Earache     Left earache with muffled hearing starting 3 days ago         History of Present Illness       Patient complains of left ear pain with muffled hearing for the past 3 days  Onset 1 day after cleaning her ear canal with a Q-tip  She now notes that her left upper neck is tender with a swollen lymph node  She denies history of ear infections other than some middle ear infections as a child  Review of Systems   Review of Systems   Constitutional: Negative for chills and fever  HENT: Positive for ear pain and hearing loss  Negative for ear discharge, rhinorrhea, sinus pressure, sore throat, tinnitus, trouble swallowing and voice change  Respiratory: Negative for cough, chest tightness, shortness of breath and wheezing  Neurological: Negative for dizziness and light-headedness           Current Medications       Current Outpatient Medications:   •  Ferrous Sulfate (IRON PO), Take 64 mg by mouth 2 (two) times a week, Disp: , Rfl:   •  methimazole (TAPAZOLE) 5 mg tablet, Take 1 tablet (5 mg total) by mouth in the morning, Disp: 90 tablet, Rfl: 1  •  multivitamin (THERAGRAN) TABS, Take 1 tablet by mouth daily, Disp: , Rfl:   •  zolpidem (AMBIEN) 10 mg tablet, Take 1 tablet (10 mg total) by mouth daily at bedtime as needed for sleep, Disp: 30 tablet, Rfl: 0    Current Allergies     Allergies as of 11/28/2022 - Reviewed 11/28/2022   Allergen Reaction Noted   • Sulfa antibiotics Other (See Comments) 11/28/2012   • Aspirin Rash and GI Intolerance 11/28/2012   • Penicillins Rash and GI Intolerance 11/28/2012            The following portions of the patient's history were reviewed and updated as appropriate: allergies, current medications, past family history, past medical history, past social history, past surgical history and problem list      Past Medical History:   Diagnosis Date   • Anemia    • Disease of thyroid gland     hyperthyroid   • Foot pain, left 2013    She fractured a bone in her left foot  Had severe pain associated with redness  Was given the diagnosis of reflex sympathetic dystrophy  Has had nerve blocks which were ineffective  • Galactorrhea in female 11/10/2015   • Genital warts    • Graves' disease 7/3/2018    TSI positive  Intolerant of TPU  Switched to methimazole 5 mg daily by endocrinologist   TSH in June of 2018 0 005 with normal T3 & T4  Followed by endocrinology at [facility] Dr Chiqui Deutsch  • Hepatitis B 1998    Diagnosed with acute Hepatitis B  Subsequent Hep B Surface Ag negative, Surface Ab positive  HCV negative, HIV negative 2010  • History of breast lump    • Hypertrophic scar 07/12/2012   • Insomnia    • Iron deficiency anemia due to chronic blood loss 7/3/2018     Hemoglobin 11 4 in June of 2018 with a ferritin of 10  Presumed secondary to menstrual losses  • Kidney stone     Has passed several kidney stones  No prior kidney stone workup  Try to stay well hydrated  No kidney stones for several years     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   • Wears glasses        Past Surgical History:   Procedure Laterality Date   • APPENDECTOMY  2001   • BREAST BIOPSY Left     benign- ultrasound biopsy- 2019   • BREAST IMPLANT Bilateral 2008   • CHOLECYSTECTOMY  2004   • ENDOMETRIAL ABLATION N/A 05/18/2021    Procedure: Kellen Marquez;  Surgeon: Vanessa Pennington MD;  Location: [facility];  Service: Gynecology   • FOOT SURGERY  2013   • WI HYSTEROSCOPY,W/ENDO BX N/A 05/18/2021    Procedure: D&C W/ HYSTEROSCOPY;  Surgeon: Vanessa Pennington MD;  Location: [facility];  Service: Gynecology   • REVISION OF SCAR  02/18/2012    appendectomy scar revised   • US GUIDED BREAST BIOPSY LEFT COMPLETE Left 04/2019       Family History   Problem Relation Age of Onset   • Breast cancer Mother 61   • Thyroid disease Mother    • Iron deficiency Mother    • Heart disease Mother    • Thyroid disease Sister    • Thyroid disease Maternal Grandmother    • Diabetes Maternal Grandmother    • Heart disease Maternal Grandfather    • Prostate cancer Maternal Grandfather    • Pancreatic cancer Maternal Grandfather    • Cervical cancer Paternal Grandmother    • Heart disease Paternal Grandmother    • Endometrial cancer Paternal Grandmother    • Heart disease Paternal Grandfather    • Hypertension Paternal Grandfather    • Cancer Paternal Grandfather    • Carpal tunnel syndrome Father    • Heart disease Maternal Aunt    • Thyroid disease Maternal Aunt    • No Known Problems Daughter    • No Known Problems Daughter    • Thyroid disease Maternal Aunt    • No Known Problems Paternal Aunt    • No Known Problems Paternal Aunt    • No Known Problems Daughter          Medications have been verified  Objective   /83   Pulse 100   Temp (!) 96 4 °F (35 8 °C)   Resp 18   Ht 5' 11" (1 803 m)   Wt 73 kg (161 lb)   LMP 11/01/2022 (Approximate)   SpO2 99%   BMI 22 45 kg/m²        Physical Exam     Physical Exam  Vitals and nursing note reviewed  Constitutional:       General: She is not in acute distress  Appearance: She is well-developed and well-nourished  HENT:      Head: Normocephalic and atraumatic  Right Ear: Ear canal normal  No drainage  A middle ear effusion is present  Left Ear: No drainage  A middle ear effusion is present  Ears:      Comments: Clear effusion behind left TM, no erythema of TMs  There is mild swelling and tenderness of the left ear canal      Nose: Mucosal edema present  Mouth/Throat:      Mouth: Mucous membranes are normal       Pharynx: No oropharyngeal exudate or posterior oropharyngeal erythema  Tonsils: No tonsillar abscesses     Musculoskeletal: Cervical back: Neck supple  Lymphadenopathy:      Cervical: Cervical adenopathy present  Skin:     General: Skin is warm and dry  Neurological:      Mental Status: She is alert and oriented to person, place, and time  Psychiatric:         Mood and Affect: Mood and affect normal          Behavior: Behavior normal          Thought Content:  Thought content normal          Judgment: Judgment normal

## 2022-12-13 ENCOUNTER — APPOINTMENT (OUTPATIENT)
Dept: LAB | Facility: CLINIC | Age: 42
End: 2022-12-13

## 2022-12-13 DIAGNOSIS — E05.00 GRAVES' DISEASE: ICD-10-CM

## 2022-12-13 LAB
T4 FREE SERPL-MCNC: 1.17 NG/DL (ref 0.76–1.46)
TSH SERPL DL<=0.05 MIU/L-ACNC: <0.007 UIU/ML (ref 0.45–4.5)

## 2022-12-19 ENCOUNTER — HOSPITAL ENCOUNTER (EMERGENCY)
Facility: HOSPITAL | Age: 42
Discharge: HOME | End: 2022-12-19
Attending: EMERGENCY MEDICINE

## 2022-12-19 VITALS
TEMPERATURE: 98 F | RESPIRATION RATE: 18 BRPM | DIASTOLIC BLOOD PRESSURE: 76 MMHG | SYSTOLIC BLOOD PRESSURE: 118 MMHG | BODY MASS INDEX: 22.47 KG/M2 | OXYGEN SATURATION: 100 % | HEIGHT: 71 IN | WEIGHT: 160.5 LBS | HEART RATE: 91 BPM

## 2022-12-19 DIAGNOSIS — U07.1 COVID-19: Primary | ICD-10-CM

## 2022-12-19 LAB
ALBUMIN SERPL BCP-MCNC: 4.1 G/DL (ref 3.5–5)
ALP SERPL-CCNC: 112 U/L (ref 46–116)
ALT SERPL W P-5'-P-CCNC: 20 U/L (ref 12–78)
ANION GAP SERPL CALCULATED.3IONS-SCNC: 10 MMOL/L (ref 4–13)
AST SERPL W P-5'-P-CCNC: 24 U/L (ref 5–45)
BACTERIA UR QL AUTO: NORMAL /HPF
BASOPHILS # BLD AUTO: 0.01 THOUSANDS/ÂΜL (ref 0–0.1)
BASOPHILS NFR BLD AUTO: 0 % (ref 0–1)
BILIRUB SERPL-MCNC: 0.53 MG/DL (ref 0.2–1)
BILIRUB UR QL STRIP: ABNORMAL
BUN SERPL-MCNC: 9 MG/DL (ref 5–25)
CALCIUM SERPL-MCNC: 9.1 MG/DL (ref 8.3–10.1)
CHLORIDE SERPL-SCNC: 102 MMOL/L (ref 96–108)
CLARITY UR: CLEAR
CO2 SERPL-SCNC: 26 MMOL/L (ref 21–32)
COLOR UR: YELLOW
CREAT SERPL-MCNC: 0.76 MG/DL (ref 0.6–1.3)
EOSINOPHIL # BLD AUTO: 0 THOUSAND/ÂΜL (ref 0–0.61)
EOSINOPHIL NFR BLD AUTO: 0 % (ref 0–6)
ERYTHROCYTE [DISTWIDTH] IN BLOOD BY AUTOMATED COUNT: 14 % (ref 11.6–15.1)
EXT PREGNANCY TEST URINE: NEGATIVE
EXT. CONTROL: NORMAL
FLUAV RNA RESP QL NAA+PROBE: NEGATIVE
FLUBV RNA RESP QL NAA+PROBE: NEGATIVE
GFR SERPL CREATININE-BSD FRML MDRD: 96 ML/MIN/1.73SQ M
GLUCOSE SERPL-MCNC: 95 MG/DL (ref 65–140)
GLUCOSE UR STRIP-MCNC: NEGATIVE MG/DL
HCT VFR BLD AUTO: 41.9 % (ref 34.8–46.1)
HGB BLD-MCNC: 13.1 G/DL (ref 11.5–15.4)
HGB UR QL STRIP.AUTO: ABNORMAL
IMM GRANULOCYTES # BLD AUTO: 0.01 THOUSAND/UL (ref 0–0.2)
IMM GRANULOCYTES NFR BLD AUTO: 0 % (ref 0–2)
KETONES UR STRIP-MCNC: ABNORMAL MG/DL
LEUKOCYTE ESTERASE UR QL STRIP: NEGATIVE
LIPASE SERPL-CCNC: 82 U/L (ref 73–393)
LYMPHOCYTES # BLD AUTO: 0.22 THOUSANDS/ÂΜL (ref 0.6–4.47)
LYMPHOCYTES NFR BLD AUTO: 5 % (ref 14–44)
MCH RBC QN AUTO: 25.6 PG (ref 26.8–34.3)
MCHC RBC AUTO-ENTMCNC: 31.3 G/DL (ref 31.4–37.4)
MCV RBC AUTO: 82 FL (ref 82–98)
MONOCYTES # BLD AUTO: 0.47 THOUSAND/ÂΜL (ref 0.17–1.22)
MONOCYTES NFR BLD AUTO: 11 % (ref 4–12)
NEUTROPHILS # BLD AUTO: 3.74 THOUSANDS/ÂΜL (ref 1.85–7.62)
NEUTS SEG NFR BLD AUTO: 84 % (ref 43–75)
NITRITE UR QL STRIP: NEGATIVE
NON-SQ EPI CELLS URNS QL MICRO: NORMAL /HPF
NRBC BLD AUTO-RTO: 0 /100 WBCS
PH UR STRIP.AUTO: 6 [PH]
PLATELET # BLD AUTO: 161 THOUSANDS/UL (ref 149–390)
PMV BLD AUTO: 9.7 FL (ref 8.9–12.7)
POTASSIUM SERPL-SCNC: 4.4 MMOL/L (ref 3.5–5.3)
PROT SERPL-MCNC: 7.7 G/DL (ref 6.4–8.4)
PROT UR STRIP-MCNC: NEGATIVE MG/DL
RBC # BLD AUTO: 5.11 MILLION/UL (ref 3.81–5.12)
RBC #/AREA URNS AUTO: NORMAL /HPF
RSV RNA RESP QL NAA+PROBE: NEGATIVE
SARS-COV-2 RNA RESP QL NAA+PROBE: POSITIVE
SODIUM SERPL-SCNC: 138 MMOL/L (ref 135–147)
SP GR UR STRIP.AUTO: >=1.03 (ref 1–1.03)
UROBILINOGEN UR QL STRIP.AUTO: 0.2 E.U./DL
WBC # BLD AUTO: 4.45 THOUSAND/UL (ref 4.31–10.16)
WBC #/AREA URNS AUTO: NORMAL /HPF

## 2022-12-19 RX ORDER — ONDANSETRON 2 MG/ML
4 INJECTION INTRAMUSCULAR; INTRAVENOUS ONCE
Status: COMPLETED | OUTPATIENT
Start: 2022-12-19 | End: 2022-12-19

## 2022-12-19 RX ORDER — KETOROLAC TROMETHAMINE 30 MG/ML
30 INJECTION, SOLUTION INTRAMUSCULAR; INTRAVENOUS ONCE
Status: COMPLETED | OUTPATIENT
Start: 2022-12-19 | End: 2022-12-19

## 2022-12-19 RX ADMIN — KETOROLAC TROMETHAMINE 30 MG: 30 INJECTION, SOLUTION INTRAMUSCULAR at 18:00

## 2022-12-19 RX ADMIN — ONDANSETRON 4 MG: 2 INJECTION INTRAMUSCULAR; INTRAVENOUS at 18:00

## 2022-12-19 RX ADMIN — SODIUM CHLORIDE, POTASSIUM CHLORIDE, SODIUM LACTATE AND CALCIUM CHLORIDE 1000 ML: 600; 310; 30; 20 INJECTION, SOLUTION INTRAVENOUS at 18:00

## 2022-12-19 NOTE — ED PROVIDER NOTES
History  Chief Complaint   Patient presents with   • Flank Pain     Pt arrives to ED from home states  states pt slept all day, woke up diaphoretic, pt states bilateral flanks hurt, chills, fever, sore throat, pt does have some dysuria and states it is difficult for her urine to start to flow  Pt has hx of kidney infections and stones  Pt also states "my thyroid has been really bad lately and I keep getting tests done"       History provided by:  Medical records, patient and spouse  Flank Pain  Pain location:  L flank and R flank  Pain quality: aching and dull    Pain radiates to:  Does not radiate  Pain severity:  Moderate  Onset quality:  Gradual  Duration:  1 day  Timing:  Constant  Progression:  Unchanged  Chronicity:  New  Context comment:  Patient with cough, sore throat, runny nose, fatigue and fever, bilateral lower back pain  Relieved by:  Nothing  Worsened by:  Nothing  Ineffective treatments:  None tried  Associated symptoms: chills, cough, fatigue, fever and sore throat    Associated symptoms: no chest pain, no diarrhea, no dysuria, no hematuria, no nausea, no shortness of breath and no vomiting    Risk factors comment:   with flulike symptoms as well  Prior to Admission Medications   Prescriptions Last Dose Informant Patient Reported? Taking?    Ferrous Sulfate (IRON PO)   Yes No   Sig: Take 64 mg by mouth 2 (two) times a week   methimazole (TAPAZOLE) 5 mg tablet   No No   Sig: Take 1 tablet (5 mg total) by mouth in the morning   multivitamin (THERAGRAN) TABS   Yes No   Sig: Take 1 tablet by mouth daily   neomycin-polymyxin-hydrocortisone (CORTISPORIN) 0 35%-10,000 units/mL-1% otic suspension   No No   Sig: Administer 4 drops into the left ear 4 (four) times a day for 7 days   predniSONE 10 mg tablet   No No   Sig: Take once daily all days pills on this schedule 6- 6- 5- 4- 3- 2- 1   zolpidem (AMBIEN) 10 mg tablet   No No   Sig: Take 1 tablet (10 mg total) by mouth daily at bedtime as needed for sleep      Facility-Administered Medications: None       Past Medical History:   Diagnosis Date   • Anemia    • Disease of thyroid gland     hyperthyroid   • Foot pain, left 2013    She fractured a bone in her left foot  Had severe pain associated with redness  Was given the diagnosis of reflex sympathetic dystrophy  Has had nerve blocks which were ineffective  • Galactorrhea in female 11/10/2015   • Genital warts    • Graves' disease 7/3/2018    TSI positive  Intolerant of TPU  Switched to methimazole 5 mg daily by endocrinologist   TSH in June of 2018 0 005 with normal T3 & T4  Followed by endocrinology at [facility] Dr Ria Tam  • Hepatitis B 1998    Diagnosed with acute Hepatitis B  Subsequent Hep B Surface Ag negative, Surface Ab positive  HCV negative, HIV negative 2010  • History of breast lump    • Hypertrophic scar 07/12/2012   • Insomnia    • Iron deficiency anemia due to chronic blood loss 7/3/2018     Hemoglobin 11 4 in June of 2018 with a ferritin of 10  Presumed secondary to menstrual losses  • Kidney stone     Has passed several kidney stones  No prior kidney stone workup  Try to stay well hydrated  No kidney stones for several years     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   • Wears glasses        Past Surgical History:   Procedure Laterality Date   • APPENDECTOMY  2001   • BREAST BIOPSY Left     benign- ultrasound biopsy- 2019   • BREAST IMPLANT Bilateral 2008   • CHOLECYSTECTOMY  2004   • ENDOMETRIAL ABLATION N/A 05/18/2021    Procedure: Patricia Meyer;  Surgeon: Keli Hall MD;  Location: [facility];  Service: Gynecology   • FOOT SURGERY  2013   • OH HYSTEROSCOPY,W/ENDO BX N/A 05/18/2021    Procedure: D&C W/ HYSTEROSCOPY;  Surgeon: Keli Hall MD;  Location: [facility];  Service: Gynecology   • REVISION OF SCAR  02/18/2012    appendectomy scar revised   • US GUIDED BREAST BIOPSY LEFT COMPLETE Left 04/2019       Family History   Problem Relation Age of Onset   • Breast cancer Mother 61   • Thyroid disease Mother    • Iron deficiency Mother    • Heart disease Mother    • Thyroid disease Sister    • Thyroid disease Maternal Grandmother    • Diabetes Maternal Grandmother    • Heart disease Maternal Grandfather    • Prostate cancer Maternal Grandfather    • Pancreatic cancer Maternal Grandfather    • Cervical cancer Paternal Grandmother    • Heart disease Paternal Grandmother    • Endometrial cancer Paternal Grandmother    • Heart disease Paternal Grandfather    • Hypertension Paternal Grandfather    • Cancer Paternal Grandfather    • Carpal tunnel syndrome Father    • Heart disease Maternal Aunt    • Thyroid disease Maternal Aunt    • No Known Problems Daughter    • No Known Problems Daughter    • Thyroid disease Maternal Aunt    • No Known Problems Paternal Aunt    • No Known Problems Paternal Aunt    • No Known Problems Daughter      I have reviewed and agree with the history as documented  E-Cigarette/Vaping   • E-Cigarette Use Never User      E-Cigarette/Vaping Substances   • Nicotine No    • THC No    • CBD No    • Flavoring No    • Other No    • Unknown No      Social History     Tobacco Use   • Smoking status: Never   • Smokeless tobacco: Never   Vaping Use   • Vaping Use: Never used   Substance Use Topics   • Alcohol use: Not Currently     Comment: rare, few times a year   • Drug use: No     Comment: 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,describes self as recovering addict       Review of Systems   Constitutional: Positive for chills, fatigue and fever  HENT: Positive for rhinorrhea, sinus pressure and sore throat  Negative for ear discharge and ear pain  Eyes: Negative for pain and visual disturbance  Respiratory: Positive for cough  Negative for shortness of breath  Cardiovascular: Negative for chest pain and palpitations     Gastrointestinal: Negative for abdominal pain, diarrhea, nausea and vomiting  Endocrine: Negative for polydipsia, polyphagia and polyuria  Genitourinary: Positive for flank pain  Negative for difficulty urinating, dysuria and hematuria  Musculoskeletal: Positive for arthralgias, back pain and myalgias  Skin: Negative for color change and rash  Allergic/Immunologic: Negative for immunocompromised state  Neurological: Negative for dizziness, seizures, syncope, weakness and headaches  Psychiatric/Behavioral: Negative for confusion and self-injury  The patient is not nervous/anxious  All other systems reviewed and are negative  Physical Exam  Physical Exam  Vitals and nursing note reviewed  Constitutional:       General: She is not in acute distress  Appearance: Normal appearance  She is not ill-appearing, toxic-appearing or diaphoretic  HENT:      Head: Normocephalic and atraumatic  Nose: Nose normal  No congestion or rhinorrhea  Mouth/Throat:      Mouth: Mucous membranes are moist       Pharynx: Oropharynx is clear  No oropharyngeal exudate or posterior oropharyngeal erythema  Eyes:      General:         Right eye: No discharge  Left eye: No discharge  Cardiovascular:      Rate and Rhythm: Normal rate and regular rhythm  Pulses: Normal pulses  Heart sounds: Normal heart sounds  No murmur heard  No gallop  Pulmonary:      Effort: Pulmonary effort is normal  No respiratory distress  Breath sounds: Normal breath sounds  No stridor  No wheezing, rhonchi or rales  Chest:      Chest wall: No tenderness  Abdominal:      General: Bowel sounds are normal  There is no distension  Palpations: Abdomen is soft  There is no mass  Tenderness: There is no abdominal tenderness  There is no right CVA tenderness, left CVA tenderness, guarding or rebound  Hernia: No hernia is present  Musculoskeletal:         General: Normal range of motion        Cervical back: Normal range of motion and neck supple  Skin:     General: Skin is warm and dry  Capillary Refill: Capillary refill takes less than 2 seconds  Neurological:      General: No focal deficit present  Mental Status: She is alert and oriented to person, place, and time  Cranial Nerves: No cranial nerve deficit  Sensory: No sensory deficit  Motor: No weakness  Coordination: Coordination normal       Gait: Gait normal       Deep Tendon Reflexes: Reflexes normal    Psychiatric:         Mood and Affect: Mood normal          Behavior: Behavior normal          Thought Content:  Thought content normal          Judgment: Judgment normal          Vital Signs  ED Triage Vitals [12/19/22 1651]   Temperature Pulse Respirations Blood Pressure SpO2   98 °F (36 7 °C) 89 17 115/79 100 %      Temp Source Heart Rate Source Patient Position - Orthostatic VS BP Location FiO2 (%)   Oral Monitor Sitting Left arm --      Pain Score       10 - Worst Possible Pain           Vitals:    12/19/22 1800 12/19/22 1830 12/19/22 1845 12/19/22 1900   BP: 120/75 133/74 146/83 118/76   Pulse: 82 76 77 91   Patient Position - Orthostatic VS: Lying Lying           Visual Acuity  Visual Acuity    Flowsheet Row Most Recent Value   L Pupil Size (mm) 3   R Pupil Size (mm) 3          ED Medications  Medications   lactated ringers bolus 1,000 mL (0 mL Intravenous Stopped 12/19/22 1908)   ondansetron (ZOFRAN) injection 4 mg (4 mg Intravenous Given 12/19/22 1800)   ketorolac (TORADOL) injection 30 mg (30 mg Intravenous Given 12/19/22 1800)       Diagnostic Studies  Results Reviewed     Procedure Component Value Units Date/Time    FLU/RSV/COVID - if FLU/RSV clinically relevant [[id number]]  (Abnormal) Collected: 12/19/22 1758    Lab Status: Final result Specimen: Nares from Nasopharyngeal Swab Updated: 12/19/22 1854     SARS-CoV-2 Positive     INFLUENZA A PCR Negative     INFLUENZA B PCR Negative     RSV PCR Negative    Narrative:      FOR PEDIATRIC PATIENTS - copy/paste COVID Guidelines URL to browser: [email or web address]    SARS-CoV-2 assay is a Nucleic Acid Amplification assay intended for the  qualitative detection of nucleic acid from SARS-CoV-2 in nasopharyngeal  swabs  Results are for the presumptive identification of SARS-CoV-2 RNA  Positive results are indicative of infection with SARS-CoV-2, the virus  causing COVID-19, but do not rule out bacterial infection or co-infection  with other viruses  Laboratories within the [place] and its  territories are required to report all positive results to the appropriate  public health authorities  Negative results do not preclude SARS-CoV-2  infection and should not be used as the sole basis for treatment or other  patient management decisions  Negative results must be combined with  clinical observations, patient history, and epidemiological information  This test has not been FDA cleared or approved  This test has been authorized by FDA under an Emergency Use Authorization  (EUA)  This test is only authorized for the duration of time the  declaration that circumstances exist justifying the authorization of the  emergency use of an in vitro diagnostic tests for detection of SARS-CoV-2  virus and/or diagnosis of COVID-19 infection under section 564(b)(1) of  the Act, 21 U  S C  095VIA-1(Z)(6), unless the authorization is terminated  or revoked sooner  The test has been validated but independent review by FDA  and CLIA is pending  Test performed using [employer] Gene[employer]pert: This RT-PCR assay targets N2,  a region unique to SARS-CoV-2  A conserved region in the E-gene was chosen  for pan-Sarbecovirus detection which includes SARS-CoV-2  According to CMS-2020-01-R, this platform meets the definition of high-throughput technology      Urine Microscopic [[id number]]  (Normal) Collected: 12/19/22 0467    Lab Status: Final result Specimen: Urine, Clean Catch Updated: 12/19/22 1831     RBC, UA 2-4 /hpf      WBC, UA 2-4 /hpf      Epithelial Cells Occasional /hpf      Bacteria, UA Occasional /hpf     Comprehensive metabolic panel [[id number]] Collected: 12/19/22 1758    Lab Status: Final result Specimen: Blood from Arm, Right Updated: 12/19/22 1830     Sodium 138 mmol/L      Potassium 4 4 mmol/L      Chloride 102 mmol/L      CO2 26 mmol/L      ANION GAP 10 mmol/L      BUN 9 mg/dL      Creatinine 0 76 mg/dL      Glucose 95 mg/dL      Calcium 9 1 mg/dL      AST 24 U/L      ALT 20 U/L      Alkaline Phosphatase 112 U/L      Total Protein 7 7 g/dL      Albumin 4 1 g/dL      Total Bilirubin 0 53 mg/dL      eGFR 96 ml/min/1 73sq m     Narrative:      [place] guidelines for Chronic Kidney Disease (CKD):   •  Stage 1 with normal or high GFR (GFR > 90 mL/min/1 73 square meters)  •  Stage 2 Mild CKD (GFR = 60-89 mL/min/1 73 square meters)  •  Stage 3A Moderate CKD (GFR = 45-59 mL/min/1 73 square meters)  •  Stage 3B Moderate CKD (GFR = 30-44 mL/min/1 73 square meters)  •  Stage 4 Severe CKD (GFR = 15-29 mL/min/1 73 square meters)  •  Stage 5 End Stage CKD (GFR <15 mL/min/1 73 square meters)  Note: GFR calculation is accurate only with a steady state creatinine    Lipase [[id number]]  (Normal) Collected: 12/19/22 1758    Lab Status: Final result Specimen: Blood from Arm, Right Updated: 12/19/22 1830     Lipase 82 u/L     UA (URINE) with reflex to Scope [[id number]]  (Abnormal) Collected: 12/19/22 1758    Lab Status: Final result Specimen: Urine, Clean Catch Updated: 12/19/22 1821     Color, UA Yellow     Clarity, UA Clear     Specific Gravity, UA >=1 030     pH, UA 6 0     Leukocytes, UA Negative     Nitrite, UA Negative     Protein, UA Negative mg/dl      Glucose, UA Negative mg/dl      Ketones, UA >=80 (3+) mg/dl      Urobilinogen, UA 0 2 E U /dl      Bilirubin, UA Small     Occult Blood, UA Trace-Intact    CBC and differential [[id number]]  (Abnormal) Collected: 12/19/22 1758    Lab Status: Final result Specimen: Blood from Arm, Right Updated: 12/19/22 1814     WBC 4 45 Thousand/uL      RBC 5 11 Million/uL      Hemoglobin 13 1 g/dL      Hematocrit 41 9 %      MCV 82 fL      MCH 25 6 pg      MCHC 31 3 g/dL      RDW 14 0 %      MPV 9 7 fL      Platelets 112 Thousands/uL      nRBC 0 /100 WBCs      Neutrophils Relative 84 %      Immat GRANS % 0 %      Lymphocytes Relative 5 %      Monocytes Relative 11 %      Eosinophils Relative 0 %      Basophils Relative 0 %      Neutrophils Absolute 3 74 Thousands/µL      Immature Grans Absolute 0 01 Thousand/uL      Lymphocytes Absolute 0 22 Thousands/µL      Monocytes Absolute 0 47 Thousand/µL      Eosinophils Absolute 0 00 Thousand/µL      Basophils Absolute 0 01 Thousands/µL     POCT pregnancy, urine [[id number]]  (Normal) Resulted: 12/19/22 1759    Lab Status: Final result Updated: 12/19/22 1759     EXT Preg Test, Ur Negative     Control Valid                 No orders to display              Procedures  Procedures         ED Course  ED Course as of 12/19/22 2136   Mon Dec 19, 2022   1711 1711:    Patient appears ill, nontoxic, vital signs reviewed  Concerns for flulike illness  Plan to complete basic labs, urinalysis given feelings of dysuria and flank pain  Benign abdominal exam   Plan to complete flu/COVID/RSV PCR  I will rehydrate with IV fluids, give analgesics for her discomfort and reevaluate  1800 1800: Labs reviewed with patient  She is feeling much improved  Stable for discharge  SBIRT 20yo+    Flowsheet Row Most Recent Value   SBIRT (25 yo +)    In order to provide better care to our patients, we are screening all of our patients for alcohol and drug use  Would it be okay to ask you these screening questions? Yes Filed at: 12/19/2022 1720   Initial Alcohol Screen: US AUDIT-C     1  How often do you have a drink containing alcohol? 0 Filed at: 12/19/2022 1720   2   How many drinks containing alcohol do you have on a typical day you are drinking? 0 Filed at: 12/19/2022 1720   3b  FEMALE Any Age, or MALE 65+: How often do you have 4 or more drinks on one occassion? 0 Filed at: 12/19/2022 1720   Audit-C Score 0 Filed at: 12/19/2022 1720   WENDY: How many times in the past year have you    Used an illegal drug or used a prescription medication for non-medical reasons? Never Filed at: 12/19/2022 1720                    MDM    Disposition  Final diagnoses:   COVID-19     Time reflects when diagnosis was documented in both MDM as applicable and the Disposition within this note     Time User Action Codes Description Comment    12/19/2022  7:00 PM Kate Lawson Add [U07 1] COVID-19       ED Disposition     ED Disposition   Discharge    Condition   Stable    Date/Time   Mon Dec 19, 2022  7:00 PM    [address] discharge to home/self care                 Follow-up Information     Follow up With Specialties Details Why [address] Freed MD Internal Medicine Schedule an appointment as soon as possible for a visit   [address] [place] [address]  [phone]            Discharge Medication List as of 12/19/2022  7:01 PM      CONTINUE these medications which have NOT CHANGED    Details   Ferrous Sulfate (IRON PO) Take 64 mg by mouth 2 (two) times a week, Historical Med      methimazole (TAPAZOLE) 5 mg tablet Take 1 tablet (5 mg total) by mouth in the morning, Starting Mon 11/28/2022, Normal      multivitamin (THERAGRAN) TABS Take 1 tablet by mouth daily, Historical Med      neomycin-polymyxin-hydrocortisone (CORTISPORIN) 0 35%-10,000 units/mL-1% otic suspension Administer 4 drops into the left ear 4 (four) times a day for 7 days, Starting Mon 11/28/2022, Until Mon 12/5/2022, Normal      predniSONE 10 mg tablet Take once daily all days pills on this schedule 6- 6- 5- 4- 3- 2- 1, Normal      zolpidem (AMBIEN) 10 mg tablet Take 1 tablet (10 mg total) by mouth daily at bedtime as needed for sleep, Starting Mon 11/28/2022, Normal             No discharge procedures on file      PDMP Review       Value Time User    PDMP Reviewed  Yes 11/28/2022 10:31 AM Felisa Rubin MD          ED Provider  Electronically Signed by           Majo Dillon MD  12/19/22 5906

## 2022-12-19 NOTE — Clinical Note
Rodo Barrowroblesshelley was seen and treated in our emergency department on 12/19/2022  Diagnosis:     Kia Sherman  may return to work on return date  She may return on this date: 12/26/2022         If you have any questions or concerns, please don't hesitate to call        Clovis Bragg MD    ______________________________           _______________          _______________  Hospital Representative                              Date                                Time

## 2022-12-22 ENCOUNTER — TELEMEDICINE (OUTPATIENT)
Dept: FAMILY MEDICINE CLINIC | Facility: CLINIC | Age: 42
End: 2022-12-22

## 2022-12-22 VITALS — TEMPERATURE: 97.1 F | BODY MASS INDEX: 22.47 KG/M2 | HEIGHT: 71 IN | WEIGHT: 160.49 LBS

## 2022-12-22 DIAGNOSIS — R07.0 THROAT PAIN: ICD-10-CM

## 2022-12-22 DIAGNOSIS — G44.89 OTHER HEADACHE SYNDROME: ICD-10-CM

## 2022-12-22 DIAGNOSIS — R09.89 CHEST CONGESTION: ICD-10-CM

## 2022-12-22 DIAGNOSIS — R05.8 OTHER COUGH: ICD-10-CM

## 2022-12-22 DIAGNOSIS — U07.1 COVID-19: Primary | ICD-10-CM

## 2022-12-22 RX ORDER — NIRMATRELVIR AND RITONAVIR 300-100 MG
3 KIT ORAL 2 TIMES DAILY
Qty: 30 TABLET | Refills: 0 | Status: SHIPPED | OUTPATIENT
Start: 2022-12-22 | End: 2022-12-27

## 2022-12-22 NOTE — PROGRESS NOTES
COVID-19 Outpatient Progress Note    Assessment/Plan:    Problem List Items Addressed This Visit    None  Visit Diagnoses     COVID-19    -  Primary    Relevant Medications    nirmatrelvir & ritonavir (Paxlovid, 300/100,) tablet therapy pack    Other cough        Relevant Medications    nirmatrelvir & ritonavir (Paxlovid, 300/100,) tablet therapy pack    Other headache syndrome        Relevant Medications    nirmatrelvir & ritonavir (Paxlovid, 300/100,) tablet therapy pack    Throat pain        Relevant Medications    nirmatrelvir & ritonavir (Paxlovid, 300/100,) tablet therapy pack    Chest congestion        Relevant Medications    nirmatrelvir & ritonavir (Paxlovid, 300/100,) tablet therapy pack         Disposition:     Patient with moderate or severe illness or has a weakened immune system  They should isolate from others through at least day 10  Isolation can be ended if symptoms are improving and they are fever free for the past 24 hours  If they still have fever or other symptoms have not improved, continue to isolate until they improve  Regardless of when you isolation is ended, avoid being around people who are more likely to get very sick from COVID-19 until at least day 11  I have spent 8 minutes directly with the patient  Greater than 50% of this time was spent in counseling/coordination of care regarding: prognosis and instructions for management  Encounter provider: BUSHRA Scales     Provider located at: [address] [place] [id number]     Recent Visits  No visits were found meeting these conditions    Showing recent visits within past 7 days and meeting all other requirements  Today's Visits  Date Type Provider Dept   12/22/22 Telemedicine [facility]   Showing today's visits and meeting all other requirements  Future Appointments  No visits were found meeting these conditions  Showing future appointments within next 150 days and meeting all other requirements     This virtual check-in was done via It was my intent to perform this visit via video technology but the patient was not able to do a video connection so the visit was completed via audio telephone only  and patient was informed that this is not a secure, HIPAA-compliant platform  She agrees to proceed  Patient agrees to participate in a virtual check in via telephone or video visit instead of presenting to the office to address urgent/immediate medical needs  Patient is aware this is a billable service  She acknowledged consent and understanding of privacy and security of the video platform  The patient has agreed to participate and understands they can discontinue the visit at any time  After connecting through [facility], the patient was identified by name and date of birth  Christopher Carrizales was informed that this was a telemedicine visit and that the exam was being conducted confidentially over secure lines  My office door was closed  No one else was in the room  Christopher Carrizales acknowledged consent and understanding of privacy and security of the telemedicine visit  I informed the patient that I have reviewed her record in Epic and presented the opportunity for her to ask any questions regarding the visit today  The patient agreed to participate  Verification of patient location:  Patient is located in the following state in which I hold an active license: [place]    Subjective:   Christopher Carrizales is a 36 y o  female who has been screened for COVID-19  Symptom change since last report: unchanged  Patient's symptoms include fever, sore throat, cough and myalgias  - Date of symptom onset: 12/19/2022      COVID-19 vaccination status: Fully vaccinated (primary series)    Cyn Kraft has been staying home and has isolated themselves in her home   She is taking care to not share personal items and is cleaning all surfaces that are touched often, like counters, tabletops, and doorknobs using household cleaning sprays or wipes  She is wearing a mask when she leaves her room  Lab Results   Component Value Date    SARSCOV2 Positive (A) 12/19/2022       Review of Systems   Constitutional: Positive for fever  HENT: Positive for sore throat  Respiratory: Positive for cough  Musculoskeletal: Positive for myalgias  Current Outpatient Medications on File Prior to Visit   Medication Sig   • Ferrous Sulfate (IRON PO) Take 64 mg by mouth 2 (two) times a week   • methimazole (TAPAZOLE) 5 mg tablet Take 1 tablet (5 mg total) by mouth in the morning   • multivitamin (THERAGRAN) TABS Take 1 tablet by mouth daily   • zolpidem (AMBIEN) 10 mg tablet Take 1 tablet (10 mg total) by mouth daily at bedtime as needed for sleep   • neomycin-polymyxin-hydrocortisone (CORTISPORIN) 0 35%-10,000 units/mL-1% otic suspension Administer 4 drops into the left ear 4 (four) times a day for 7 days   • predniSONE 10 mg tablet Take once daily all days pills on this schedule 6- 6- 5- 4- 3- 2- 1 (Patient not taking: Reported on 12/22/2022)       Objective:    Temp (!) 97 1 °F (36 2 °C)   Ht 5' 11" (1 803 m)   Wt 72 8 kg (160 lb 7 9 oz)   LMP 12/13/2022   BMI 22 38 kg/m²      Physical Exam  Neurological:      Mental Status: She is alert and oriented to person, place, and time         Amor Shape

## 2022-12-22 NOTE — LETTER
December 22, 2022    Patient: Curly Cowden  YOB: 1980  Date of Last Encounter: 11/28/2022      To whom it may concern:     Curly Cowden has tested positive for COVID-19 (Coronavirus)  She may return to work on 12/30/2022, which is 10 days from illness onset (provided symptoms are improving) and 24 hours without fever      Sincerely,         Jessie Romo

## 2022-12-28 DIAGNOSIS — G47.09 OTHER INSOMNIA: ICD-10-CM

## 2022-12-28 DIAGNOSIS — E05.00 GRAVES' DISEASE: ICD-10-CM

## 2022-12-28 RX ORDER — METHIMAZOLE 5 MG/1
5 TABLET ORAL DAILY
Qty: 90 TABLET | Refills: 3 | Status: SHIPPED | OUTPATIENT
Start: 2022-12-28

## 2022-12-28 RX ORDER — ZOLPIDEM TARTRATE 10 MG/1
10 TABLET ORAL
Qty: 30 TABLET | Refills: 0 | Status: SHIPPED | OUTPATIENT
Start: 2022-12-28

## 2022-12-28 NOTE — TELEPHONE ENCOUNTER
PDMP reviewed  No red flags identified; safe to proceed with prescription      PDMP Review       Value Time User    PDMP Reviewed  Yes 12/28/2022 10:34 AM Felisa Rubin MD

## 2023-01-26 ENCOUNTER — APPOINTMENT (OUTPATIENT)
Dept: LAB | Facility: CLINIC | Age: 43
End: 2023-01-26

## 2023-01-26 DIAGNOSIS — G47.09 OTHER INSOMNIA: ICD-10-CM

## 2023-01-26 DIAGNOSIS — E05.00 GRAVES' DISEASE: ICD-10-CM

## 2023-01-26 LAB
T3 SERPL-MCNC: 1 NG/ML (ref 0.6–1.8)
T4 FREE SERPL-MCNC: 1.11 NG/DL (ref 0.76–1.46)
TSH SERPL DL<=0.05 MIU/L-ACNC: <0.007 UIU/ML (ref 0.45–4.5)

## 2023-01-27 RX ORDER — ZOLPIDEM TARTRATE 10 MG/1
TABLET ORAL
Qty: 30 TABLET | Refills: 0 | Status: SHIPPED | OUTPATIENT
Start: 2023-01-27

## 2023-01-27 NOTE — TELEPHONE ENCOUNTER
PDMP reviewed  No red flags identified; safe to proceed with prescription      PDMP Review       Value Time User    PDMP Reviewed  Yes 1/27/2023  3:24 PM Tavia Duke MD

## 2023-02-16 ENCOUNTER — APPOINTMENT (OUTPATIENT)
Dept: LAB | Facility: CLINIC | Age: 43
End: 2023-02-16

## 2023-02-16 DIAGNOSIS — G89.29 OTHER CHRONIC PAIN: ICD-10-CM

## 2023-02-16 DIAGNOSIS — M25.50 ARTHRALGIA, UNSPECIFIED JOINT: ICD-10-CM

## 2023-02-16 DIAGNOSIS — R53.83 OTHER FATIGUE: ICD-10-CM

## 2023-02-16 DIAGNOSIS — M25.512 PAIN IN LEFT SHOULDER: ICD-10-CM

## 2023-02-16 DIAGNOSIS — E55.9 VITAMIN D INSUFFICIENCY: ICD-10-CM

## 2023-02-16 LAB
25(OH)D3 SERPL-MCNC: 23.5 NG/ML (ref 30–100)
C3 SERPL-MCNC: 114 MG/DL (ref 90–180)
C4 SERPL-MCNC: 21 MG/DL (ref 10–40)
CRP SERPL QL: <3 MG/L
ERYTHROCYTE [SEDIMENTATION RATE] IN BLOOD: 8 MM/HOUR (ref 0–19)
FERRITIN SERPL-MCNC: 56 NG/ML (ref 8–388)
MAGNESIUM SERPL-MCNC: 2.3 MG/DL (ref 1.6–2.6)
URATE SERPL-MCNC: 2.9 MG/DL (ref 2–7.5)
VIT B12 SERPL-MCNC: 858 PG/ML (ref 100–900)

## 2023-02-17 ENCOUNTER — HOSPITAL ENCOUNTER (OUTPATIENT)
Dept: ULTRASOUND IMAGING | Facility: HOSPITAL | Age: 43
End: 2023-02-17
Attending: SURGERY

## 2023-02-17 DIAGNOSIS — M25.512 PAIN IN LEFT SHOULDER: ICD-10-CM

## 2023-02-17 DIAGNOSIS — G89.29 OTHER CHRONIC PAIN: ICD-10-CM

## 2023-02-22 ENCOUNTER — OFFICE VISIT (OUTPATIENT)
Dept: PODIATRY | Age: 43
End: 2023-02-22

## 2023-02-22 ENCOUNTER — HOSPITAL ENCOUNTER (OUTPATIENT)
Dept: RADIOLOGY | Facility: CLINIC | Age: 43
Discharge: HOME | End: 2023-02-22

## 2023-02-22 VITALS
SYSTOLIC BLOOD PRESSURE: 140 MMHG | WEIGHT: 160 LBS | HEIGHT: 71 IN | BODY MASS INDEX: 22.4 KG/M2 | DIASTOLIC BLOOD PRESSURE: 78 MMHG

## 2023-02-22 DIAGNOSIS — M79.672 LEFT FOOT PAIN: ICD-10-CM

## 2023-02-22 DIAGNOSIS — M79.672 LEFT FOOT PAIN: Primary | ICD-10-CM

## 2023-02-22 RX ORDER — HYDROCODONE BITARTRATE AND ACETAMINOPHEN 10; 325 MG/1; MG/1
TABLET ORAL
COMMUNITY
Start: 2023-02-20

## 2023-02-22 NOTE — PATIENT INSTRUCTIONS
Wear supportive shoes at all times  Avoid flip-flops, flat sandals, barefoot walking (never walk barefoot, even at home)  Generally avoid shoes that are too flexible and bend in the arch  Your shoes should only slightly bend in the toe area, not the middle  Running sneakers are often the best choice  Supportive sneaker brands: Konrad Mourning, New Balance, Asics, [employer]  Supportive daily shoe brands: Vionic, Orthofeet Amanda, Dansko, Olive branch, Lucillie [place], Birkenstock  Supportive home shoes: Merton Gitelman (recovery slides)     [address] [facility] [address]  com: Vasyli+Candelario 1st Ray Orthotic, X-Small, 1st Ray Function, Removable Distal & Proximal Plugs, Full-Length Insole, Low Resistance to Joint, Heat Moldable, Rear Foot Control, Lasting Pain Relief : Health & Household

## 2023-02-22 NOTE — PROGRESS NOTES
Assessment/Plan:     Diagnoses and all orders for this visit:    Left foot pain  -     X-ray foot left 3+ views; Future  -     US MSK limited; Future    Other orders  -     HYDROcodone-acetaminophen (NORCO)  mg per tablet          Imaging Reviewed at this visit (I personally reviewed/independently interpreted images and reports in PACS)  · XR left foot WB 3v (2/22/23): No acute osseous abnormality noted  Slight elevation of 1st ray  · MRI left ankle/heel (7/6/22): Small heel spur underlying the region of interest without evidence of acute plantar fasciitis  Subcutaneous tissues are normal      IMPRESSION:  · Left heel pain  Differential diagnosis include lipoma, plantar fibroma  CRPS component possible  Consider nerve involvement  PLAN:  · I reviewed clinical exam and radiographic imaging (XR, MRI) with patient in detail today  Unfortunately MRI does not show evidence of soft tissue masses and plantar fascia is normal  Patient's current pain is essentially all of the time (constant ache) and has not improved with PT, oral steroid/nsaids, steroid injection, CMOs (1st ray cutout), shoe gear mod  I fear jumping in to surgery is not at her best interest without further workup given her hx of possible CRPS, poor post-op foot surgery (trinity bunion)  The heel nodules do feel tiny and this would additionally be a difficult surgery to resect  · Left heel US MSK ordered to assess for lipoma vs plantar fibroma as I was able to palpate 2 tiny nodules to plantar heel  · F/u w/ spine and pain for possibly recs - hx of diag with CRPS in past  · F/u with gen surg for left shoulder pain  I will await further recs from them about possible biopsy     · F/u 3-4 weeks    I spent >48min on this patient exam today including addressing one chronic problem, reviewing prior external notes (gen surg, PCP, endocrinology), reviewing results of a unique test (MRI), ordering unique test (XR and MSK US), independently interpreting a test (XR)  >50% of face-to-face time with the patient was spent counseling and/or coordinating care  Subjective:      Patient ID: Ashleigh Fry is a 36 y o  female  Jerrell Barefoot presents to clinic today concerning left heel mass and pain  She notes she has multiple lipomas to her body and is concerned that she has one to her heel  She has been assessed in past by outside podiatrists without improvement  Had MRI a few months ago (PCP) which did not reveal a mass nor plantar fasciitis  Has tried steriod injections, PT, po meds (nsaids, steroid), CMOs and shoe gear change without improvement  Notes pain is all of the time now (dulle ache) however worsens with WB and damp/cold weather  She notes it feels the same as her shoulder pain  She is discouraged with the pain  She was referred to me by Dr Umm Martinez for further assessment  Of note, she has lumps to both of arches as well  Hx of EHL inflammation btwn sesamoids as child with injections done at [address] [place] [address]  She notes that she was told that she would have chronic foot pain starting at that age  She notes someone told her she has CRPS (14yo)  Notes Aunt w/ hx of lumps - unknown diagnosis but did have several removed  Patient raies question of Dercum's disease (multiple painful lipomas on trunk and limbs)    Has tried gabapentin by neuro for CRPS  See's rheumatology, endocrinology, gen surg  The following portions of the patient's history were reviewed and updated as appropriate: allergies, current medications, past family history, past medical history, past social history, past surgical history and problem list     Review of Systems   Constitutional: Negative for activity change, chills and fever  HENT: Negative  Respiratory: Negative for cough, chest tightness and shortness of breath  Cardiovascular: Negative for chest pain and leg swelling  Endocrine: Negative  Genitourinary: Negative      Musculoskeletal: Positive for gait problem (Left heel pain)  Neurological: Negative for numbness  Psychiatric/Behavioral: Negative  Negative for agitation and behavioral problems  Objective:      /78 (BP Location: Left arm, Patient Position: Sitting, Cuff Size: Standard)   Ht 5' 11" (1 803 m)   Wt 72 6 kg (160 lb)   BMI 22 32 kg/m²          Physical Exam  Constitutional:       General: She is not in acute distress  Appearance: Normal appearance  She is not ill-appearing  Cardiovascular:      Pulses: Normal pulses  Comments: Bilateral DP & PT pulses 2/4  CRT WNL  Pedal hair present but slightly diminished  Feet are cool to touch with slight blue discoloration to toes  Pulmonary:      Effort: No respiratory distress  Musculoskeletal:         General: Tenderness (Left plantar heel) present  Comments: Bilateral ankle, STJ, TNJ, TMTJ, MTPJ ROM WNL and without pain  LE muscle strength WNL  I palpate 2 small nodules deep to skin to left heel near insertion of plantar fascia  They do feel hard to touch  There is pain to this area  Skin:     General: Skin is warm  Capillary Refill: Capillary refill takes less than 2 seconds  Findings: No erythema or lesion  Neurological:      General: No focal deficit present  Mental Status: She is alert and oriented to person, place, and time  Sensory: No sensory deficit  Comments: Gross sensation intact  Denies N/T/B to B/L feet      Psychiatric:         Mood and Affect: Mood normal          Behavior: Behavior normal

## 2023-02-24 ENCOUNTER — TELEPHONE (OUTPATIENT)
Dept: PODIATRY | Age: 43
End: 2023-02-24

## 2023-02-24 NOTE — TELEPHONE ENCOUNTER
Caller: Patient    Doctor: Dennis Dobbins / Nidhi Contreras    Reason for call: Need RX for Us MSK limited sent to [address] [place] [address] # [phone]    Phone :  [phone]:        [phone]    Call back#: [phone]

## 2023-03-09 RX ORDER — COVID-19 ANTIGEN TEST
KIT MISCELLANEOUS
COMMUNITY
Start: 2023-02-20

## 2023-03-09 RX ORDER — TRAZODONE HYDROCHLORIDE 50 MG/1
50 TABLET ORAL
COMMUNITY
Start: 2023-01-27 | End: 2023-03-13

## 2023-03-13 ENCOUNTER — CONSULT (OUTPATIENT)
Dept: PAIN MEDICINE | Facility: CLINIC | Age: 43
End: 2023-03-13

## 2023-03-13 ENCOUNTER — HOSPITAL ENCOUNTER (OUTPATIENT)
Dept: RADIOLOGY | Facility: CLINIC | Age: 43
Discharge: HOME | End: 2023-03-13

## 2023-03-13 VITALS
DIASTOLIC BLOOD PRESSURE: 76 MMHG | HEIGHT: 72 IN | RESPIRATION RATE: 20 BRPM | SYSTOLIC BLOOD PRESSURE: 118 MMHG | HEART RATE: 73 BPM | TEMPERATURE: 97.3 F | BODY MASS INDEX: 22.57 KG/M2 | WEIGHT: 166.6 LBS

## 2023-03-13 DIAGNOSIS — M79.18 MYOFASCIAL PAIN SYNDROME: ICD-10-CM

## 2023-03-13 DIAGNOSIS — M54.2 NECK PAIN: ICD-10-CM

## 2023-03-13 DIAGNOSIS — G24.3 CERVICAL DYSTONIA: ICD-10-CM

## 2023-03-13 DIAGNOSIS — M54.2 NECK PAIN: Primary | ICD-10-CM

## 2023-03-13 RX ORDER — DULOXETIN HYDROCHLORIDE 30 MG/1
30 CAPSULE, DELAYED RELEASE ORAL DAILY
Qty: 30 CAPSULE | Refills: 0 | Status: SHIPPED | OUTPATIENT
Start: 2023-03-13

## 2023-03-13 NOTE — PROGRESS NOTES
Assessment  1  Neck pain  -     XR spine cervical 2 or 3 vw injury; Future; Expected date: 03/13/2023  -     DULoxetine (CYMBALTA) 30 mg delayed release capsule; Take 1 capsule (30 mg total) by mouth daily    2  Myofascial pain syndrome  -     DULoxetine (CYMBALTA) 30 mg delayed release capsule; Take 1 capsule (30 mg total) by mouth daily    3  Cervical dystonia    Left sided neck pain described by myofascial component; trigger point palpable overlying left trapezius muscle where lipoma found to be underlying  Difficulty with neck mobility, alanis with right neck motion  Palpable tenderness to palpation with extension of pain proximally and distally in this region  Similarly patient has lipoma overlying sole of right foot near heel  Mobile 0 5cm fluctuant lipoma  Lateral heel discoloration from recent trauma  Extensively discussed treatment options including cymbalta, possible botox injections for cervical dystonia given past failure of trigger points, PT, opioid medications  Risks, benefits and alternatives discussed    Plan  -f/u 4 weeks  -xray cervical spine; f/u result with patient  -cymbalta 30mg/day ordered for patient; counseled regarding sedative effects of taking this medication and provided up titration calendar  Counseled not to take medication while driving or operating heavy machinery/using stairs  -Physician directed home exercise plan as per [employer]OS demonstrated and handouts provided that patient plans to participate with for 1 hour, twice a week for the next 6 weeks  There are risks associated with opioid medications, including dependence, addiction and tolerance  The patient understands and agrees to use these medications only as prescribed  Potential side effects of the medications include, but are not limited to, constipation, drowsiness, addiction, impaired judgment and risk of fatal overdose if not taken as prescribed   The patient was warned against driving while taking sedation medications or operating heavy machinery  The patient voiced understanding  Sharing medications is a felony  At this point in time, the patient is showing no signs of addiction, abuse, diversion or suicidal ideation  [facility] Prescription Drug Monitoring Program report was reviewed and was appropriate      Complete risks and benefits including bleeding, infection, tissue reaction, nerve injury and allergic reaction were discussed  The approach was demonstrated using models and literature was provided  Verbal and written consent was obtained  My impressions and treatment recommendations were discussed in detail with the patient who verbalized understanding and had no further questions  Discharge instructions were provided  I personally saw and examined the patient and I agree with the above discussed plan of care  New Medications Ordered This Visit   Medications   • Flowflex COVID-19 Ag Home Test KIT     Sig: TESTING   • DULoxetine (CYMBALTA) 30 mg delayed release capsule     Sig: Take 1 capsule (30 mg total) by mouth daily     Dispense:  30 capsule     Refill:  0       History of Present Illness    Jorge Wang Yasir Alarcon is a 36 y o  female  With pmhx of graves disease, kidney stones presenting with neck pain described by primarily myofascial pain features  Achy, nagging, indolent, crampy and throbbing pain in left trapezius muscles that worsens as the day progresses  Similarly has throbbing pain in right heel where lipoma palpable at right heel  Tramadol had decreased this pain nearly 40-50%  In the past she has tried PT efforts, nsaids and tylenol without significant relief  8/10 pain, restricted range of motion with left sided neck articulation  Denies any radicular features of pain at this time  Denies any bowel/bladder abnormality, gait abnormality, saddle anesthesia      I have personally reviewed and/or updated the patient's past medical history, past surgical history, family history, social history, current medications, allergies, and vital signs today  Review of Systems   Constitutional: Positive for activity change  HENT: Negative  Eyes: Negative  Respiratory: Negative  Cardiovascular: Negative  Gastrointestinal: Negative  Endocrine: Negative  Genitourinary: Negative  Musculoskeletal: Positive for arthralgias, back pain, myalgias, neck pain and neck stiffness  Negative for gait problem  Skin: Negative  Allergic/Immunologic: Negative  Neurological: Negative for weakness and numbness  Hematological: Negative  Psychiatric/Behavioral: Negative  All other systems reviewed and are negative  Patient Active Problem List   Diagnosis   • Iron deficiency anemia due to chronic blood loss   • Graves' disease   • Other insomnia   • Foot pain, left   • Kidney stones   • Muscle cramp   • Galactorrhea   • Decreased libido   • Left breast mass   • Menorrhagia with regular cycle   • Lipoma of back   • Mild intermittent reactive airway disease without complication   • Neck pain   • Multiple thyroid nodules       Past Medical History:   Diagnosis Date   • Anemia    • Disease of thyroid gland     hyperthyroid   • Foot pain, left 2013    She fractured a bone in her left foot  Had severe pain associated with redness  Was given the diagnosis of reflex sympathetic dystrophy  Has had nerve blocks which were ineffective  • Galactorrhea in female 11/10/2015   • Genital warts    • Graves' disease 7/3/2018    TSI positive  Intolerant of TPU  Switched to methimazole 5 mg daily by endocrinologist   TSH in June of 2018 0 005 with normal T3 & T4  Followed by endocrinology at [facility] Dr Antonina Savage  • Hepatitis B 1998    Diagnosed with acute Hepatitis B  Subsequent Hep B Surface Ag negative, Surface Ab positive  HCV negative, HIV negative 2010      • History of breast lump    • Hypertrophic scar 07/12/2012   • Insomnia    • Iron deficiency anemia due to chronic blood loss 7/3/2018 Hemoglobin 11 4 in June of 2018 with a ferritin of 10  Presumed secondary to menstrual losses  • Kidney stone     Has passed several kidney stones  No prior kidney stone workup  Try to stay well hydrated  No kidney stones for several years     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   • Wears glasses        Past Surgical History:   Procedure Laterality Date   • APPENDECTOMY  2001   • BREAST BIOPSY Left     benign- ultrasound biopsy- 2019   • BREAST IMPLANT Bilateral 2008   • CHOLECYSTECTOMY  2004   • ENDOMETRIAL ABLATION N/A 05/18/2021    Procedure: Graciela Schmidt;  Surgeon: Suzon Fleischer, MD;  Location: [facility];  Service: Gynecology   • FOOT SURGERY  2013   • CT HYSTEROSCOPY BX ENDOMETRIUM&/POLYPC W/WO D&C N/A 05/18/2021    Procedure: D&C W/ HYSTEROSCOPY;  Surgeon: Suzon Fleischer, MD;  Location: [facility];  Service: Gynecology   • REVISION OF SCAR  02/18/2012    appendectomy scar revised   • US GUIDED BREAST BIOPSY LEFT COMPLETE Left 04/2019       Family History   Problem Relation Age of Onset   • Breast cancer Mother 61   • Thyroid disease Mother    • Iron deficiency Mother    • Heart disease Mother    • Thyroid disease Sister    • Thyroid disease Maternal Grandmother    • Diabetes Maternal Grandmother    • Heart disease Maternal Grandfather    • Prostate cancer Maternal Grandfather    • Pancreatic cancer Maternal Grandfather    • Cervical cancer Paternal Grandmother    • Heart disease Paternal Grandmother    • Endometrial cancer Paternal Grandmother    • Heart disease Paternal Grandfather    • Hypertension Paternal Grandfather    • Cancer Paternal Grandfather    • Carpal tunnel syndrome Father    • Heart disease Maternal Aunt    • Thyroid disease Maternal Aunt    • No Known Problems Daughter    • No Known Problems Daughter    • Thyroid disease Maternal Aunt    • No Known Problems Paternal Aunt    • No Known Problems Paternal Aunt    • No Known Problems Daughter        Social History     Occupational History   • Not on file   Tobacco Use   • Smoking status: Never   • Smokeless tobacco: Never   Vaping Use   • Vaping Use: Never used   Substance and Sexual Activity   • Alcohol use: Not Currently     Comment: rare, few times a year   • Drug use: No     Comment: 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,describes self as recovering addict   • Sexual activity: Yes     Partners: Male     Birth control/protection: Male Sterilization       Current Outpatient Medications on File Prior to Visit   Medication Sig   • Ferrous Sulfate (IRON PO) Take 64 mg by mouth 2 (two) times a week   • Flowflex COVID-19 Ag Home Test KIT TESTING   • HYDROcodone-acetaminophen (NORCO)  mg per tablet    • methimazole (TAPAZOLE) 5 mg tablet Take 1 tablet (5 mg total) by mouth in the morning   • multivitamin (THERAGRAN) TABS Take 1 tablet by mouth daily   • zolpidem (AMBIEN) 10 mg tablet TAKE 1 TABLET BY MOUTH DAILY AT BEDTIME AS NEEDED FOR SLEEP   • neomycin-polymyxin-hydrocortisone (CORTISPORIN) 0 35%-10,000 units/mL-1% otic suspension Administer 4 drops into the left ear 4 (four) times a day for 7 days   • predniSONE 10 mg tablet Take once daily all days pills on this schedule 6- 6- 5- 4- 3- 2- 1 (Patient not taking: Reported on 12/22/2022)   • [DISCONTINUED] traZODone (DESYREL) 50 mg tablet Take 50 mg by mouth (Patient not taking: Reported on 3/13/2023)     No current facility-administered medications on file prior to visit         Allergies   Allergen Reactions   • Sulfa Antibiotics Other (See Comments)     Worsens UTI symptoms   • Aspirin Rash and GI Intolerance   • Penicillins Rash and GI Intolerance     Physical Exam    /76   Pulse 73   Temp (!) 97 3 °F (36 3 °C)   Resp 20   Ht 6' 0 25" (1 835 m)   Wt 75 6 kg (166 lb 9 6 oz)   BMI 22 44 kg/m²     Constitutional: normal, well developed, well nourished, alert, in no distress and non-toxic and no overt pain behavior  Eyes: anicteric  HEENT: grossly intact  Neck: supple, symmetric, trachea midline and no masses   Pulmonary:even and unlabored  Cardiovascular:No edema or pitting edema present  Skin:Normal without rashes or lesions and well hydrated  Psychiatric:Mood and affect appropriate  Neurologic:Cranial Nerves II-XII grossly intact Sensation grossly intact; no clonus negative puga's  Reflexes 2+ and brisk  Spurling's maneuver negative bilaterally  Musculoskeletal:normal gait  5/5 strength bilaterally with AROM in upper extremities  Significant pain with cervical facet loading bilaterally and with lateral spine rotation  TTP over cervical paraspinal muscles  Negative bree's test, negative gaenslen's negative SIJ loading bilaterally      Imaging    No pertinent imaging to review at this time

## 2023-03-13 NOTE — PATIENT INSTRUCTIONS
Neck Exercises   AMBULATORY CARE:   Neck exercises  help reduce neck pain, and improve neck movement and strength  Neck exercises also help prevent long-term neck problems  Call your doctor if:   Your pain does not get better, or gets worse  You have questions or concerns about your condition, care, or exercise program     What you need to know about exercise safety:   Move slowly, gently, and smoothly  Avoid fast or jerky motions  Stand and sit the way your healthcare provider shows you  Good posture may reduce your neck pain  Check your posture often, even when you are not doing your neck exercises  Follow the exercise program recommended by your healthcare provider  He or she will tell you which exercises are best for your condition  He or she will also tell you how many repetitions to do and how often you should do the exercises  How to perform neck exercises safely:   Exercise position:  You may sit or stand while you do neck exercises  Face forward  Your shoulders should be straight and relaxed, with a good posture  Head tilts, forward and back:  Gently bow your head and try to touch your chin to your chest  Your healthcare provider may tell you to push on the back of your neck to help bow your head  Raise your chin back to the starting position  Tilt your head back as far as possible so you are looking up at the ceiling  Your healthcare provider may tell you to lift your chin to help tilt your head back  Return your head to the starting position  Head tilts, side to side:  Tilt your head, bringing your ear toward your shoulder  Then tilt your head toward the other shoulder  Head turns:  Turn your head to look over your shoulder  Tilt your chin down and try to touch it to your shoulder  Do not raise your shoulder to your chin  Face forward again  Do the same on the other side           Head rolls:  Slowly bring your chin toward your chest  Next, roll your head to the right  Your ear should be positioned over your shoulder  Hold this position for 5 seconds  Roll your head back toward your chest and to the left into the same position  Hold for 5 seconds  Gently roll your head back and around in a clockwise [place] 3 times  Next, move your head in the reverse direction (counterclockwise) in a [place] 3 times  Do not shrug your shoulders upwards while you do this exercise  Follow up with your doctor as directed:  Write down your questions so you remember to ask them during your visits  © Copyright Efe Dean 2022 Information is for End User's use only and may not be sold, redistributed or otherwise used for commercial purposes  The above information is an  only  It is not intended as medical advice for individual conditions or treatments  Talk to your doctor, nurse or pharmacist before following any medical regimen to see if it is safe and effective for you  Duloxetine (By mouth)   Duloxetine ([id number]-LOX-[id number])  Treats depression, anxiety, diabetic peripheral neuropathy, fibromyalgia, and chronic muscle or bone pain  This medicine is a SNRI  Brand Name(s): Cymbalta, Drizalma Sprinkle, Irenka   There may be other brand names for this medicine  When This Medicine Should Not Be Used: This medicine is not right for everyone  Do not use it if you had an allergic reaction to duloxetine  How to Use This Medicine:   Capsule, Delayed Release Capsule  Take your medicine as directed  Your dose may need to be changed several times to find what works best for you  Delayed-release capsule: Swallow the capsule whole  Do not crush, chew, break, or open it  Do not open the Cymbalta® delayed-release capsule and sprinkle the contents on food or in liquids  If you have trouble swallowing the Aldon Ort delayed release capsule: You may open the capsule and sprinkle the contents over one tablespoon (15 mL) of applesauce   Swallow the mixture right away and do not save any of the mixture to use later  You may open the capsule and pour the contents to an all plastic catheter tip syringe and add 50 mL of water  Do not use other liquids  Gently shake it for 10 seconds, and then use it through a nasogastric tube  Rinse with additional water (about 15 mL) if needed  This medicine should come with a Medication Guide  Ask your pharmacist for a copy if you do not have one  Missed dose: Take a dose as soon as you remember  If it is almost time for your next dose, wait until then and take a regular dose  Do not take extra medicine to make up for a missed dose  Store the medicine in a closed container at room temperature, away from heat, moisture, and direct light  Drugs and Foods to Avoid:   Ask your doctor or pharmacist before using any other medicine, including over-the-counter medicines, vitamins, and herbal products  Do not take duloxetine if you have used an MAO inhibitor (MAOI) within the past 14 days  Do not start taking an MAO inhibitor within 5 days of stopping duloxetine  Ask your doctor if you are not sure if you take an MAOI, including linezolid or methylene blue injection  Some medicines can affect how duloxetine works   Tell your doctor if you are using any of the following:  Buspirone, cimetidine, ciprofloxacin, enoxacin, fentanyl, fluvoxamine, lithium, Jasper's wort, theophylline, tramadol, tryptophan, warfarin  Amphetamines  Blood pressure medicine  Diuretic (water pill)  Medicine for heart rhythm problems (including flecainide, propafenone, quinidine)  Medicine to treat migraine headaches (including triptans)  NSAID pain or arthritis medicine (including aspirin, celecoxib, diclofenac, ibuprofen, naproxen)  Other medicine to treat depression or mood disorders (including amitriptyline, desipramine, fluoxetine, imipramine, nortriptyline, paroxetine)  Phenothiazine medicine (including thioridazine)  Stomach medicine (including famotidine, antacids containing aluminum or magnesium, PPIs)  Tell your doctor if you use anything else that makes you sleepy  Some examples are allergy medicine, narcotic pain medicine, and alcohol  Do not drink alcohol while you are using this medicine  Warnings While Using This Medicine:   Tell your doctor if you are pregnant or breastfeeding, or if you have kidney disease, liver disease, bleeding problems, diabetes, digestion problems, glaucoma, heart disease, high or low blood pressure, or problems with urination  Tell your doctor if you smoke or you have a history of seizures, mental health problems (including bipolar disorder, ruben), or drug or alcohol addiction  This medicine may cause the following problems:   Serious liver problems  Serotonin syndrome, when used with certain medicines  Increased risk of bleeding problems  Serious skin reactions, including erythema multiforme, Thrasher-Carlos syndrome  Low sodium levels in the blood  Sexual problems  This medicine can increase thoughts of suicide  Tell your doctor right away if you start to feel depressed and have thoughts about hurting yourself  This medicine may cause blurred vision, dizziness, drowsiness, trouble with thinking, or trouble with controlling body movements, which may lead to falls, fractures, or other injuries  Do not drive or do anything that could be dangerous until you know how this medicine affects you  Stand up slowly to avoid falls  Do not stop using this medicine suddenly  Your doctor will need to slowly decrease your dose before you stop it completely  Your doctor will check your progress and the effects of this medicine at regular visits  Keep all appointments  Keep all medicine out of the reach of children  Never share your medicine with anyone  Possible Side Effects While Using This Medicine:   Call your doctor right away if you notice any of these side effects:   Allergic reaction: Itching or hives, swelling in your face or hands, swelling or tingling in your mouth or throat, chest tightness, trouble breathing  Anxiety, restlessness, fever, fast heartbeat, sweating, muscle spasms, diarrhea, seeing or hearing things that are not there  Blistering, peeling, red skin rash  Confusion, weakness, muscle twitching  Dark urine or pale stools, nausea, vomiting, loss of appetite, stomach pain, yellow skin or eyes  Decrease in how much or how often you urinate  Decrease in sexual ability, desire, drive, or performance  Eye pain, vision changes, seeing halos around lights  Feeling more energetic than usual  Lightheadedness, dizziness, fainting  Unusual moods or behaviors, worsening depression, thoughts about hurting yourself, trouble sleeping  Unusual bleeding or bruising  If you notice these less serious side effects, talk with your doctor:   Decrease in appetite or weight  Dry mouth, constipation, mild nausea  Headache  Unusual drowsiness, sleepiness, or tiredness  If you notice other side effects that you think are caused by this medicine, tell your doctor  Call your doctor for medical advice about side effects  You may report side effects to FDA at 9-644-FDA-3193  © Copyright Leander Watson 2022 Information is for End User's use only and may not be sold, redistributed or otherwise used for commercial purposes  The above information is an  only  It is not intended as medical advice for individual conditions or treatments  Talk to your doctor, nurse or pharmacist before following any medical regimen to see if it is safe and effective for you

## 2023-03-13 NOTE — LETTER
March 13, 2023     [facility]  [facility] [id number]    Patient: Ana Wang   YOB: 1980   Date of Visit: 3/13/2023       Dear Dr Zonia Kaplan: Thank you for referring Ana Wang to me for evaluation  Below are my notes for this consultation  If you have questions, please do not hesitate to call me  I look forward to following your patient along with you  Sincerely,        Lang Beatty MD        CC: No Recipients  Lang Beatty MD  3/13/2023  1:00 PM  Signed      Assessment  1  Neck pain  -     XR spine cervical 2 or 3 vw injury; Future; Expected date: 03/13/2023  -     DULoxetine (CYMBALTA) 30 mg delayed release capsule; Take 1 capsule (30 mg total) by mouth daily    2  Myofascial pain syndrome  -     DULoxetine (CYMBALTA) 30 mg delayed release capsule; Take 1 capsule (30 mg total) by mouth daily    3  Cervical dystonia    Left sided neck pain described by myofascial component; trigger point palpable overlying left trapezius muscle where lipoma found to be underlying  Difficulty with neck mobility, alanis with right neck motion  Palpable tenderness to palpation with extension of pain proximally and distally in this region  Similarly patient has lipoma overlying sole of right foot near heel  Mobile 0 5cm fluctuant lipoma  Lateral heel discoloration from recent trauma  Extensively discussed treatment options including cymbalta, possible botox injections for cervical dystonia given past failure of trigger points, PT, opioid medications  Risks, benefits and alternatives discussed    Plan  -f/u 4 weeks  -xray cervical spine; f/u result with patient  -cymbalta 30mg/day ordered for patient; counseled regarding sedative effects of taking this medication and provided up titration calendar    Counseled not to take medication while driving or operating heavy machinery/using stairs  -Physician directed home exercise plan as per [employer] demonstrated and handouts provided that patient plans to participate with for 1 hour, twice a week for the next 6 weeks  There are risks associated with opioid medications, including dependence, addiction and tolerance  The patient understands and agrees to use these medications only as prescribed  Potential side effects of the medications include, but are not limited to, constipation, drowsiness, addiction, impaired judgment and risk of fatal overdose if not taken as prescribed  The patient was warned against driving while taking sedation medications or operating heavy machinery  The patient voiced understanding  Sharing medications is a felony  At this point in time, the patient is showing no signs of addiction, abuse, diversion or suicidal ideation  [place] Prescription Drug Monitoring Program report was reviewed and was appropriate      Complete risks and benefits including bleeding, infection, tissue reaction, nerve injury and allergic reaction were discussed  The approach was demonstrated using models and literature was provided  Verbal and written consent was obtained  My impressions and treatment recommendations were discussed in detail with the patient who verbalized understanding and had no further questions  Discharge instructions were provided  I personally saw and examined the patient and I agree with the above discussed plan of care  New Medications Ordered This Visit   Medications   • Flowflex COVID-19 Ag Home Test KIT     Sig: TESTING   • DULoxetine (CYMBALTA) 30 mg delayed release capsule     Sig: Take 1 capsule (30 mg total) by mouth daily     Dispense:  30 capsule     Refill:  0       History of Present Illness    Robertajulian Elbert Leon is a 36 y o  female  With pmhx of graves disease, kidney stones presenting with neck pain described by primarily myofascial pain features  Achy, nagging, indolent, crampy and throbbing pain in left trapezius muscles that worsens as the day progresses   Similarly has throbbing pain in right heel where lipoma palpable at right heel  Tramadol had decreased this pain nearly 40-50%  In the past she has tried PT efforts, nsaids and tylenol without significant relief  8/10 pain, restricted range of motion with left sided neck articulation  Denies any radicular features of pain at this time  Denies any bowel/bladder abnormality, gait abnormality, saddle anesthesia  I have personally reviewed and/or updated the patient's past medical history, past surgical history, family history, social history, current medications, allergies, and vital signs today  Review of Systems   Constitutional: Positive for activity change  HENT: Negative  Eyes: Negative  Respiratory: Negative  Cardiovascular: Negative  Gastrointestinal: Negative  Endocrine: Negative  Genitourinary: Negative  Musculoskeletal: Positive for arthralgias, back pain, myalgias, neck pain and neck stiffness  Negative for gait problem  Skin: Negative  Allergic/Immunologic: Negative  Neurological: Negative for weakness and numbness  Hematological: Negative  Psychiatric/Behavioral: Negative  All other systems reviewed and are negative  Patient Active Problem List   Diagnosis   • Iron deficiency anemia due to chronic blood loss   • Graves' disease   • Other insomnia   • Foot pain, left   • Kidney stones   • Muscle cramp   • Galactorrhea   • Decreased libido   • Left breast mass   • Menorrhagia with regular cycle   • Lipoma of back   • Mild intermittent reactive airway disease without complication   • Neck pain   • Multiple thyroid nodules       Past Medical History:   Diagnosis Date   • Anemia    • Disease of thyroid gland     hyperthyroid   • Foot pain, left 2013    She fractured a bone in her left foot  Had severe pain associated with redness  Was given the diagnosis of reflex sympathetic dystrophy  Has had nerve blocks which were ineffective       • Galactorrhea in female 11/10/2015   • Genital warts • Graves' disease 7/3/2018    TSI positive  Intolerant of TPU  Switched to methimazole 5 mg daily by endocrinologist   TSH in June of 2018 0 005 with normal T3 & T4  Followed by endocrinology at [facility] Dr Keith Shipley  • Hepatitis B 1998    Diagnosed with acute Hepatitis B  Subsequent Hep B Surface Ag negative, Surface Ab positive  HCV negative, HIV negative 2010  • History of breast lump    • Hypertrophic scar 07/12/2012   • Insomnia    • Iron deficiency anemia due to chronic blood loss 7/3/2018     Hemoglobin 11 4 in June of 2018 with a ferritin of 10  Presumed secondary to menstrual losses  • Kidney stone     Has passed several kidney stones  No prior kidney stone workup  Try to stay well hydrated  No kidney stones for several years     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   • Wears glasses        Past Surgical History:   Procedure Laterality Date   • APPENDECTOMY  2001   • BREAST BIOPSY Left     benign- ultrasound biopsy- 2019   • BREAST IMPLANT Bilateral 2008   • CHOLECYSTECTOMY  2004   • ENDOMETRIAL ABLATION N/A 05/18/2021    Procedure: Martínez Hansen;  Surgeon: Andrae Carlton MD;  Location: [facility];  Service: Gynecology   • FOOT SURGERY  2013   • MD HYSTEROSCOPY BX ENDOMETRIUM&/POLYPC W/WO D&C N/A 05/18/2021    Procedure: D&C W/ HYSTEROSCOPY;  Surgeon: Andrae Carlton MD;  Location: [facility];  Service: Gynecology   • REVISION OF SCAR  02/18/2012    appendectomy scar revised   • US GUIDED BREAST BIOPSY LEFT COMPLETE Left 04/2019       Family History   Problem Relation Age of Onset   • Breast cancer Mother 61   • Thyroid disease Mother    • Iron deficiency Mother    • Heart disease Mother    • Thyroid disease Sister    • Thyroid disease Maternal Grandmother    • Diabetes Maternal Grandmother    • Heart disease Maternal Grandfather    • Prostate cancer Maternal Grandfather    • Pancreatic cancer Maternal Grandfather    • Cervical cancer Paternal Grandmother    • Heart disease Paternal Grandmother    • Endometrial cancer Paternal Grandmother    • Heart disease Paternal Grandfather    • Hypertension Paternal Grandfather    • Cancer Paternal Grandfather    • Carpal tunnel syndrome Father    • Heart disease Maternal Aunt    • Thyroid disease Maternal Aunt    • No Known Problems Daughter    • No Known Problems Daughter    • Thyroid disease Maternal Aunt    • No Known Problems Paternal Aunt    • No Known Problems Paternal Aunt    • No Known Problems Daughter        Social History     Occupational History   • Not on file   Tobacco Use   • Smoking status: Never   • Smokeless tobacco: Never   Vaping Use   • Vaping Use: Never used   Substance and Sexual Activity   • Alcohol use: Not Currently     Comment: rare, few times a year   • Drug use: No     Comment: 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,describes self as recovering addict   • Sexual activity: Yes     Partners: Male     Birth control/protection: Male Sterilization       Current Outpatient Medications on File Prior to Visit   Medication Sig   • Ferrous Sulfate (IRON PO) Take 64 mg by mouth 2 (two) times a week   • Flowflex COVID-19 Ag Home Test KIT TESTING   • HYDROcodone-acetaminophen (NORCO)  mg per tablet    • methimazole (TAPAZOLE) 5 mg tablet Take 1 tablet (5 mg total) by mouth in the morning   • multivitamin (THERAGRAN) TABS Take 1 tablet by mouth daily   • zolpidem (AMBIEN) 10 mg tablet TAKE 1 TABLET BY MOUTH DAILY AT BEDTIME AS NEEDED FOR SLEEP   • neomycin-polymyxin-hydrocortisone (CORTISPORIN) 0 35%-10,000 units/mL-1% otic suspension Administer 4 drops into the left ear 4 (four) times a day for 7 days   • predniSONE 10 mg tablet Take once daily all days pills on this schedule 6- 6- 5- 4- 3- 2- 1 (Patient not taking: Reported on 12/22/2022)   • [DISCONTINUED] traZODone (DESYREL) 50 mg tablet Take 50 mg by mouth (Patient not taking: Reported on 3/13/2023)     No current facility-administered medications on file prior to visit  Allergies   Allergen Reactions   • Sulfa Antibiotics Other (See Comments)     Worsens UTI symptoms   • Aspirin Rash and GI Intolerance   • Penicillins Rash and GI Intolerance     Physical Exam    /76   Pulse 73   Temp (!) 97 3 °F (36 3 °C)   Resp 20   Ht 6' 0 25" (1 835 m)   Wt 75 6 kg (166 lb 9 6 oz)   BMI 22 44 kg/m²     Constitutional: normal, well developed, well nourished, alert, in no distress and non-toxic and no overt pain behavior  Eyes: anicteric  HEENT: grossly intact  Neck: supple, symmetric, trachea midline and no masses   Pulmonary:even and unlabored  Cardiovascular:No edema or pitting edema present  Skin:Normal without rashes or lesions and well hydrated  Psychiatric:Mood and affect appropriate  Neurologic:Cranial Nerves II-XII grossly intact Sensation grossly intact; no clonus negative puga's  Reflexes 2+ and brisk  Spurling's maneuver negative bilaterally  Musculoskeletal:normal gait  5/5 strength bilaterally with AROM in upper extremities  Significant pain with cervical facet loading bilaterally and with lateral spine rotation  TTP over cervical paraspinal muscles  Negative bree's test, negative gaenslen's negative SIJ loading bilaterally      Imaging    No pertinent imaging to review at this time

## 2023-03-16 ENCOUNTER — TELEPHONE (OUTPATIENT)
Dept: RADIOLOGY | Facility: CLINIC | Age: 43
End: 2023-03-16

## 2023-03-16 NOTE — TELEPHONE ENCOUNTER
----- Message from Taylor Mary MD sent at 3/16/2023  8:18 AM EDT -----  Please let her know neck xray looks normal; will f/u with her regarding next steps in clinic  ----- Message -----  From: Interface, Radiology Results In  Sent: 3/15/2023   9:55 PM EDT  To: Taylor Mary MD

## 2023-03-16 NOTE — TELEPHONE ENCOUNTER
Pt informed that neck XR were normal and Dr Arya Mishra will f/u with her about next steps at her upcoming ovs next month  Pt agreed

## 2023-03-24 ENCOUNTER — OFFICE VISIT (OUTPATIENT)
Dept: PODIATRY | Age: 43
End: 2023-03-24

## 2023-03-24 VITALS
HEIGHT: 72 IN | WEIGHT: 166 LBS | BODY MASS INDEX: 22.48 KG/M2 | SYSTOLIC BLOOD PRESSURE: 110 MMHG | DIASTOLIC BLOOD PRESSURE: 60 MMHG

## 2023-03-24 DIAGNOSIS — M79.18 MYOFASCIAL PAIN SYNDROME: Primary | ICD-10-CM

## 2023-03-24 DIAGNOSIS — D17.20 LIPOMA OF FOOT: ICD-10-CM

## 2023-03-24 DIAGNOSIS — M79.672 LEFT FOOT PAIN: ICD-10-CM

## 2023-03-24 NOTE — PROGRESS NOTES
Assessment/Plan:     Diagnoses and all orders for this visit:    Myofascial pain syndrome  -     Ambulatory Referral to Orthopedic Surgery; Future    Left foot pain  -     Ambulatory Referral to Orthopedic Surgery; Future    Lipoma of foot          Imaging Reviewed  · XR left foot WB 3v (2/22/23): No acute osseous abnormality noted  Slight elevation of 1st ray  · MRI left ankle/heel (7/6/22): Small heel spur underlying the region of interest without evidence of acute plantar fasciitis  Subcutaneous tissues are normal   · US left foot 3/21/23: "Normal ultrasound of the plantar fascia  Prominent fat deposition in the heel pads bilaterally with with an appearance on the left more convincing of a lipoma "      IMPRESSION:  · Left heel pain  Patient does have likely small lipoma seen on US  PLAN:  · Left heel US MSK reviewed, possible lipoma noted  Patient noted painful lipomas at other areas of body and has family hx of Liss's dz    · I reviewed spine and pain for possibly recs - currently on cymbalta w/o relief  F/u for further med mnmgt of pain   · Dr Brooks Donald with ortho onc referral placed for further workup due to multiple lipomas throughout the body  · I discussed possible psychology/therapy consult for pain - patient will look in to on own  · Patient does not want steroid injection as this has caused her a steroid flare in the past  · I again recommended cushioned and comfortable shoes, heel cushion  · F/u PRN      Subjective:      Patient ID: Jessica Swartz is a 36 y o  female  Leonard Tafoya presents to clinic today concerning f/u left heel mass and pain  No change in pain, lumps to left heel and neck are still painful  Went to rheum, pain mngmt and got msk US left heel since she saw me last  Notes in the past Tramadol, norco were the only things that took her pain away    Past HPI "She notes she has multiple lipomas to her body and is concerned that she has one to her heel   She has been assessed in past by outside podiatrists without improvement  Had MRI a few months ago (PCP) which did not reveal a mass nor plantar fasciitis  Has tried steriod injections, PT, po meds (nsaids, steroid), CMOs and shoe gear change without improvement  Notes pain is all of the time now (dulle ache) however worsens with WB and damp/cold weather  She notes it feels the same as her shoulder pain  She is discouraged with the pain  She was referred to me by Dr Chris Plummer for further assessment  Of note, she has lumps to both of arches as well  Hx of EHL inflammation btwn sesamoids as child with injections done at [address] [place] [address]  She notes that she was told that she would have chronic foot pain starting at that age  She notes someone told her she has CRPS (12yo)  Notes Aunt w/ hx of lumps - unknown diagnosis but did have several removed  Patient raies question of Dercum's disease (multiple painful lipomas on trunk and limbs)  Has tried gabapentin by neuro for CRPS  See's rheumatology, endocrinology, gen surg "       The following portions of the patient's history were reviewed and updated as appropriate: allergies, current medications, past family history, past medical history, past social history, past surgical history and problem list     Review of Systems   Constitutional: Negative for activity change, chills and fever  HENT: Negative  Respiratory: Negative for cough, chest tightness and shortness of breath  Cardiovascular: Negative for chest pain and leg swelling  Endocrine: Negative  Genitourinary: Negative  Musculoskeletal: Positive for gait problem (Left heel pain)  Neurological: Negative for numbness  Psychiatric/Behavioral: Negative  Negative for agitation and behavioral problems           Objective:      /60 (BP Location: Left arm, Patient Position: Sitting, Cuff Size: Standard)   Ht 6' (1 829 m)   Wt 75 3 kg (166 lb)   BMI 22 51 kg/m²          Physical Exam  Constitutional:       General: She is not in acute distress  Appearance: Normal appearance  She is not ill-appearing  Cardiovascular:      Pulses: Normal pulses  Comments: Bilateral DP & PT pulses 2/4  CRT WNL  Pedal hair present but slightly diminished  Feet are cool to touch with slight blue discoloration to toes  Pulmonary:      Effort: No respiratory distress  Musculoskeletal:         General: Tenderness (Left plantar heel) present  Comments: Bilateral ankle, STJ, TNJ, TMTJ, MTPJ ROM WNL and without pain  LE muscle strength WNL  I palpate 2 small nodules deep to skin to left heel near insertion of plantar fascia  They do feel hard to touch  There is pain to this area  I feel a well circumscribed soft mass within the plantar heel pad of left heel   Skin:     General: Skin is warm  Capillary Refill: Capillary refill takes less than 2 seconds  Findings: No erythema or lesion  Neurological:      General: No focal deficit present  Mental Status: She is alert and oriented to person, place, and time  Sensory: No sensory deficit  Comments: Gross sensation intact  Denies N/T/B to B/L feet      Psychiatric:         Mood and Affect: Mood normal          Behavior: Behavior normal

## 2023-03-24 NOTE — PATIENT INSTRUCTIONS
Find the Best Chronic Pain Therapists and Psychologists in [place], [place] - [employer]     Foot Pain Home Therapy    Stretch  Place painful foot in back with heel on ground and lean against wall  Do not lift heel off of ground  You will feel the stretch in the calf muscles and possibly the heel  Hold the stretch for 20-30 seconds  Do this at least 5-6 times per day  It is best done after exercise when your muscles are warm  Wear supportive shoes at all times  Avoid flip-flops, flat sandals, barefoot walking (never walk barefoot, even at home)  Generally avoid shoes that are too flexible and bend in the arch  Your shoes should only slightly bend in the toe area, not the middle  Running sneakers are often the best choice  Supportive sneaker brands: Pelkie [place], New Balance, Asics, [employer]  Supportive daily shoe brands: Vionic, Orthofeet Amanda, Dansko, [place] branch, Carleene Carp, Birkenstock  Supportive home shoes: Chick Pinna (recovery slides)  Purchase over the counter topical pain creams such as Voltarin gel, biofreeze, or CBD cream - will need to apply 2-3 times per day for benefit  Achilles Tendon Stretching Exercises    A) Standing Gastrocnemius stretch  Place hands on wall or chair  If using wall, put your hands at eye level  Step the leg you want to stretch behind you  Keep your back heel on the floor and point your toes straight ahead or slightly inward towards the heel of the opposite foot  Bend your knee toward the wall while keeping your back leg straight  Lean toward the wall until you feel a gentle stretch in you calf of the straight leg  Don't lean so far that you feel pain  Hold for 15 seconds  Complete 3 reps  B) Standing soleus stretch  Place your hands on the wall or chair  If using wall, put your hands at eye level  Step the leg you want to stretch behind you (your back foot will need to be closer to the front foot than the above stretch)   Keep your back heel on the floor and point your toes straight ahead or slightly inward towards the heel of the opposite foot  Bend both your front and back knee at the same time (may help to stick your butt out)  You do not need to lean towards the wall, just bend the knees  Lean toward the wall until you feel a gentle stretch in you calf of the straight leg  Don't lean so far that you feel pain  Hold for 15 seconds  Complete 3 reps  Keep these tips and tricks in mind to get the most out of your stretching; Take your time - move slowly, whether you are deepening into a stretch or changing positions  This will limit the risk of injury & discomfort  Avoid bouncing - quick sudden movements will only worsen achilles tendon issues  Stay relaxed during stretch  Keep your heel down and toes straight ahead or slightly inward - this will allow the achilles tendon to stretch properly  Stop if you feel pain - Don't strain or force your muscle  If you feel sharp pain, stop immediately

## 2023-04-04 ENCOUNTER — OFFICE VISIT (OUTPATIENT)
Dept: OBGYN CLINIC | Facility: CLINIC | Age: 43
End: 2023-04-04

## 2023-04-04 VITALS
WEIGHT: 166 LBS | HEIGHT: 72 IN | SYSTOLIC BLOOD PRESSURE: 156 MMHG | DIASTOLIC BLOOD PRESSURE: 79 MMHG | BODY MASS INDEX: 22.48 KG/M2 | HEART RATE: 89 BPM

## 2023-04-04 DIAGNOSIS — M79.672 LEFT FOOT PAIN: ICD-10-CM

## 2023-04-04 DIAGNOSIS — M79.18 MYOFASCIAL PAIN SYNDROME: ICD-10-CM

## 2023-04-04 DIAGNOSIS — M54.2 NECK PAIN: ICD-10-CM

## 2023-04-04 DIAGNOSIS — D48.1 NEOPLASM OF UNCERTAIN BEHAVIOR OF CONNECTIVE AND OTHER SOFT TISSUE: Primary | ICD-10-CM

## 2023-04-04 RX ORDER — DULOXETIN HYDROCHLORIDE 30 MG/1
CAPSULE, DELAYED RELEASE ORAL
Qty: 90 CAPSULE | Refills: 1 | Status: SHIPPED | OUTPATIENT
Start: 2023-04-04

## 2023-04-04 NOTE — PROGRESS NOTES
ORTHOPEDIC ONCOLOGY NEW PATIENT NOTE    Patient: Trista Neri   Age: 36 y o  Sex: female  Gender: female  Date of visit: 4/4/2023   Referring provider: Elen Polk DPM  Patient Care Team:  Dana Adams MD as PCP - General (Family Medicine)  [facility]   Fax# [phone]    ASSESSMENT/PLAN: Trista Neri is a 36 y o  female with:    1   Palpable lesions of left trapezius, left heel, bilateral lower back  - discussed consistency with lymph chains and locations of lymph nodes   - discussed limited evidence for decrums disease, vs lipomatosis, neither supported due to lesions not feeling fatty in nature; discussed fatty masses typically non painful   - discussed uncertainty in related nature of lumps, reassured that pain may feel similar  - discussed history of lymphatic pain, with removal of thyroid and groin lymphademopathy; which may be due to increased sensitivity   - discussed principles of surgery- removing things due to malignancy, potential to become malignant, etc  - in this case, unknown if there would be benefit to removing them- trading a bump for a scar, with the potential for painful scar healing   - discussed hypersensitivity and psychosomatic features of increased sensitivity to pain  - expressed desire to benefit patient and reduce pain, with shared medical decision in mind to keep surgery as the last option after conservative measures and potential causes addressed to best   - without significant findings on multiple imaging studies, unable to determine possible physiological cause to best determine course of action  - discussed OTC pain relief, with aleve regimen, topical creams such as voltaren, continuing to use heat as beneficial   - continue following with pain management and other providers PRN to trend progression in pain relief    2  Thyroid nodules  - continue current management     3   Lesion of pineal gland - continue current management     HISTORY OF PRESENT ILLNESS:     Cristina Madera is a 36 y o  female with history of endocrine disorders as listed in [address] who presents for consultation at the request of Terri Gibbs DPM regarding multiple painful lipomas throughout the body, with a possible family history of Dercum's disease  She is being worked up by Dr Janette Miller for left heel pain and left heel lipoma noted on MSK US, but not seen on MRI    Pain in shoulder and heel most concerning, not able to workout anymore; unable to walk on heel, affecting gait     Has seen rheumatology, podiatry, general surgeon, pain specialist, multiple workups  Has tried heating pads; tramadol was helpful in the past, most recently Norco that helped  Has tried PT for foot pain prior  Describes bursting of posterior/low back lesions  Has tried injections cortisone to both shoulder and heel    History of one removed in groin in 2004, clipped when having daughter/appendix removed  Denies cafe au lait spots, axillary freckling  At baseline patient gaits without assistance  No noted constitutional symptoms such as fever, chills, night sweats, fatigue, weight gains/losses  Denies chest pain/shortness of breath  History of skin cancer removed from back    Occupation: 2 jobs, able to sit during    Past Medical History:   Diagnosis Date   • Anemia    • Disease of thyroid gland     hyperthyroid   • Foot pain, left 2013    She fractured a bone in her left foot  Had severe pain associated with redness  Was given the diagnosis of reflex sympathetic dystrophy  Has had nerve blocks which were ineffective  • Galactorrhea in female 11/10/2015   • Genital warts    • Graves' disease 7/3/2018    TSI positive  Intolerant of TPU  Switched to methimazole 5 mg daily by endocrinologist   TSH in June of 2018 0 005 with normal T3 & T4  Followed by [facility]ology at [facility] Dr Shawna Bell     • Hepatitis B 1998    Diagnosed with acute Hepatitis B  Subsequent Hep B Surface Ag negative, Surface Ab positive  HCV negative, HIV negative 2010  • History of breast lump    • Hypertrophic scar 07/12/2012   • Insomnia    • Iron deficiency anemia due to chronic blood loss 7/3/2018     Hemoglobin 11 4 in June of 2018 with a ferritin of 10  Presumed secondary to menstrual losses  • Kidney stone     Has passed several kidney stones  No prior kidney stone workup  Try to stay well hydrated  No kidney stones for several years     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   • Wears glasses      Past Surgical History:   Procedure Laterality Date   • APPENDECTOMY  2001   • BREAST BIOPSY Left     benign- ultrasound biopsy- 2019   • BREAST IMPLANT Bilateral 2008   • CHOLECYSTECTOMY  2004   • ENDOMETRIAL ABLATION N/A 05/18/2021    Procedure: Nieves Hernandez;  Surgeon: Jodee Dougherty MD;  Location: [facility];  Service: Gynecology   • FOOT SURGERY  2013   • TX HYSTEROSCOPY BX ENDOMETRIUM&/POLYPC W/WO D&C N/A 05/18/2021    Procedure: D&C W/ HYSTEROSCOPY;  Surgeon: Jodee Dougherty MD;  Location: [facility];  Service: Gynecology   • REVISION OF SCAR  02/18/2012    appendectomy scar revised   • US GUIDED BREAST BIOPSY LEFT COMPLETE Left 04/2019       Current Outpatient Medications:   •  DULoxetine (CYMBALTA) 30 mg delayed release capsule, Take 1 capsule (30 mg total) by mouth daily, Disp: 30 capsule, Rfl: 0  •  Ferrous Sulfate (IRON PO), Take 64 mg by mouth 2 (two) times a week, Disp: , Rfl:   •  Flowflex COVID-19 Ag Home Test KIT, TESTING, Disp: , Rfl:   •  HYDROcodone-acetaminophen (NORCO)  mg per tablet, , Disp: , Rfl:   •  methimazole (TAPAZOLE) 5 mg tablet, Take 1 tablet (5 mg total) by mouth in the morning, Disp: 90 tablet, Rfl: 3  •  multivitamin (THERAGRAN) TABS, Take 1 tablet by mouth daily, Disp: , Rfl:   •  neomycin-polymyxin-hydrocortisone (CORTISPORIN) 0 35%-10,000 units/mL-1% otic suspension, Administer 4 drops into the left ear 4 (four) times a day for 7 days, Disp: 10 mL, Rfl: 0  •  zolpidem (AMBIEN) 10 mg tablet, TAKE 1 TABLET BY MOUTH DAILY AT BEDTIME AS NEEDED FOR SLEEP, Disp: 30 tablet, Rfl: 0  •  predniSONE 10 mg tablet, Take once daily all days pills on this schedule 6- 6- 5- 4- 3- 2- 1 (Patient not taking: Reported on 12/22/2022), Disp: 27 tablet, Rfl: 0  Allergies   Allergen Reactions   • Sulfa Antibiotics Other (See Comments)     Worsens UTI symptoms   • Aspirin Rash and GI Intolerance   • Penicillins Rash and GI Intolerance     Family History   Problem Relation Age of Onset   • Breast cancer Mother 61   • Thyroid disease Mother    • Iron deficiency Mother    • Heart disease Mother    • Thyroid disease Sister    • Thyroid disease Maternal Grandmother    • Diabetes Maternal Grandmother    • Heart disease Maternal Grandfather    • Prostate cancer Maternal Grandfather    • Pancreatic cancer Maternal Grandfather    • Cervical cancer Paternal Grandmother    • Heart disease Paternal Grandmother    • Endometrial cancer Paternal Grandmother    • Heart disease Paternal Grandfather    • Hypertension Paternal Grandfather    • Cancer Paternal Grandfather    • Carpal tunnel syndrome Father    • Heart disease Maternal Aunt    • Thyroid disease Maternal Aunt    • No Known Problems Daughter    • No Known Problems Daughter    • Thyroid disease Maternal Aunt    • No Known Problems Paternal Aunt    • No Known Problems Paternal Aunt    • No Known Problems Daughter      Social History     Socioeconomic History   • Marital status: /Civil Union     Spouse name: Not on file   • Number of children: Not on file   • Years of education: Not on file   • Highest education level: Not on file   Occupational History   • Not on file   Tobacco Use   • Smoking status: Never   • Smokeless tobacco: Never   Vaping Use   • Vaping Use: Never used   Substance and Sexual Activity   • Alcohol use: Not Currently Comment: rare, few times a year   • Drug use: No     Comment: 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,describes self as recovering addict   • Sexual activity: Yes     Partners: Male     Birth control/protection: Male Sterilization   Other Topics Concern   • Not on file   Social History Narrative    Works as a  for [employer]  Social Determinants of Health     Financial Resource Strain: Not on file   Food Insecurity: Not on file   Transportation Needs: Not on file   Physical Activity: Not on file   Stress: Not on file   Social Connections: Not on file   Intimate Partner Violence: Not on file   Housing Stability: Not on file       REVIEW OF SYSTEMS  Allergies, medications, past medical/surgical/family/social history have been reviewed  Complete 12 system review performed and found to be negative except: except as per mentioned in HPI  Physical Exam     Height: 6' (182 9 cm)  Weight - Scale: 75 3 kg (166 lb)  BMI (Calculated): 22 5  BSA (Calculated - m2): 1 97 sq meters     Vitals:    04/04/23 0754   BP: 156/79   Pulse: 89     Body mass index is 22 51 kg/m²  General: alert and oriented; well nourished/well developed; no apparent distress  Psychiatric: normal mood and affect  HEENT: NCAT  Head/neck - full range of motion  Lungs: breathing comfortably; equal symmetric chest expansion  Abdomen: soft, non-tender, non-distended  Skin: warm; dry; no lesions, rashes, petechiae or purpura; no clubbing, no cyanosis, no edema  Extremities:    Inspection: no edema, skin abnormalities throughout   Palpation: multiple palpable masses or lesions   Motor strength: WNL all extremities  Sensation: grossly intact to all extremities  Pulses: present   Lymphatics: painful inguinal and posterior cervical lymph chains   Gait: normal gait      ROM limited to neck rotation to the left due to trapezius pain  Posterior lymph chain consistent with location of pain in neck/shoulder    Bruising to left heel, present since October  Rigid, mobile small ovoid, orzo sized masses      IMAGING RESULTS, All images personally review today by Dr Raysa Rosen  Study: MRI without  Area: foot/ankle  Date: 7/6/22  Impression: Small heel spur underlying the region of interest without evidence of acute plantar fasciitis  Mild posterior tibialis tenosynovitis  Mild common peroneal tenosynovitis  No true lipoma seen    Study: US  Area: Foot   Date: 3/21/23  Impression: FINDINGS: The plantar fascia is normal in thickness and echotexture, measuring 3 mm on the left and 3 mm on the right  There is increased fatty tissue in the heel pad bilaterally  On the left that has a more masslike appearance measuring approximately 1 7 x 0 5 x 1 6 cm, which is seen compressing and shifting during palpation, seen best on cine loop 20 and 21  The appearance of the heel pad is similar on the right although a discrete nodule is not seen  Study: US  Area: superficial lump left shoulder  Date: 2/17/23  FINDINGS:  No abnormality identified throughout the imaged region of concern  Impression: No ultrasonographic abnormality is noted to correlate with the area of palpable concern  Pertinent laboratory findings:  None    Pathology  None available    Microbiology:  Cultures: None    Review of referring provider's records:  Dru Flatness, DPM  Date: 3/24/23  Impression:   · Left heel US MSK reviewed, possible lipoma noted  Patient noted painful lipomas at other areas of body and has family hx of Dervalentinem's dz    • I reviewed spine and pain for possibly recs - currently on cymbalta w/o relief  F/u for further med mnmgt of pain   · Dr Raysa Rosen with ortho onc referral placed for further workup due to multiple lipomas throughout the body    Dr Chapis Moon, General Surgery with [facility]  2/16/23  Due to the location of her symptoms in the deep calcaneal region of the left foot, I feel this is an issue that should be managed by podiatry   The patient was unhappy with her previous podiatry encounters/results and therefore I offered her a referral to a different podiatrist locally for a second opinion  As for the left shoulder pain - I do not feel a discrete mass confidently on exam and therefore will order an ultrasound of the area to better identify a potential mass  Follow up with results of ultrasound  FOLLOW UP: Return for imaging results       45 minutes was spent in the coordination of care, reviewing of imaging and with the patient on the date of service    Scribe Attestation    I,:  David Tuttle PA-C am acting as a scribe while in the presence of the attending physician :       I,:  Andre Alamo, DO personally performed the services described in this documentation    as scribed in my presence :            David Tuttle PA-C   4/4/2023 8:29 AM

## 2023-04-19 ENCOUNTER — HOSPITAL ENCOUNTER (OUTPATIENT)
Dept: MRI IMAGING | Facility: HOSPITAL | Age: 43
Discharge: HOME | End: 2023-04-19

## 2023-04-19 DIAGNOSIS — D48.1 NEOPLASM OF UNCERTAIN BEHAVIOR OF CONNECTIVE AND OTHER SOFT TISSUE: ICD-10-CM

## 2023-04-19 RX ADMIN — GADOBUTROL 8 ML: 604.72 INJECTION INTRAVENOUS at 17:06

## 2023-05-01 ENCOUNTER — TELEPHONE (OUTPATIENT)
Dept: OBGYN CLINIC | Facility: HOSPITAL | Age: 43
End: 2023-05-01

## 2023-05-01 NOTE — TELEPHONE ENCOUNTER
Caller: Patient    Doctor: Dr Nelson Espinoza    Reason for call: Patient calling stating that she had MRI of Cervical spine on 4/19/22  She was wondering what the next steps will be  Is someone going to call her or should she make a f/u appt with Dr Nelson Espinoza    Patient asking to speak to clinical team     Call back#: [phone]

## 2023-06-13 ENCOUNTER — APPOINTMENT (OUTPATIENT)
Dept: LAB | Facility: CLINIC | Age: 43
End: 2023-06-13
Payer: COMMERCIAL

## 2023-06-13 DIAGNOSIS — E05.00 GRAVES' DISEASE: ICD-10-CM

## 2023-06-13 DIAGNOSIS — N92.6 MENSTRUAL IRREGULARITY: ICD-10-CM

## 2023-06-13 DIAGNOSIS — F52.0 HYPOACTIVE SEXUAL DESIRE DISORDER: ICD-10-CM

## 2023-06-13 LAB
CORTIS SERPL-MCNC: 17.2 UG/DL
ESTRADIOL SERPL-MCNC: 71.1 PG/ML
FSH SERPL-ACNC: 24.7 MIU/ML
T3FREE SERPL-MCNC: 2.7 PG/ML (ref 2.5–3.9)
T4 FREE SERPL-MCNC: 0.65 NG/DL (ref 0.61–1.12)
TSH SERPL DL<=0.05 MIU/L-ACNC: 0.01 UIU/ML (ref 0.45–4.5)

## 2023-06-13 PROCEDURE — 83001 ASSAY OF GONADOTROPIN (FSH): CPT

## 2023-06-13 PROCEDURE — 84439 ASSAY OF FREE THYROXINE: CPT

## 2023-06-13 PROCEDURE — 84481 FREE ASSAY (FT-3): CPT

## 2023-06-13 PROCEDURE — 83520 IMMUNOASSAY QUANT NOS NONAB: CPT

## 2023-06-13 PROCEDURE — 36415 COLL VENOUS BLD VENIPUNCTURE: CPT

## 2023-06-13 PROCEDURE — 82670 ASSAY OF TOTAL ESTRADIOL: CPT

## 2023-06-13 PROCEDURE — 82533 TOTAL CORTISOL: CPT

## 2023-06-13 PROCEDURE — 84443 ASSAY THYROID STIM HORMONE: CPT

## 2023-06-13 PROCEDURE — 82627 DEHYDROEPIANDROSTERONE: CPT

## 2023-06-14 LAB — DHEA-S SERPL-MCNC: 29.6 UG/DL (ref 57.3–279.2)

## 2023-06-15 LAB — TSH RECEP AB SER-ACNC: 2.2 IU/L (ref 0–1.75)

## 2023-06-20 ENCOUNTER — HOSPITAL ENCOUNTER (OUTPATIENT)
Dept: RADIOLOGY | Facility: CLINIC | Age: 43
Discharge: HOME | End: 2023-06-20
Payer: COMMERCIAL

## 2023-06-20 VITALS — HEIGHT: 71 IN | BODY MASS INDEX: 23.8 KG/M2 | WEIGHT: 170 LBS

## 2023-06-20 DIAGNOSIS — Z12.31 ENCOUNTER FOR SCREENING MAMMOGRAM FOR MALIGNANT NEOPLASM OF BREAST: ICD-10-CM

## 2023-06-20 PROCEDURE — 77063 BREAST TOMOSYNTHESIS BI: CPT

## 2023-06-20 PROCEDURE — 77067 SCR MAMMO BI INCL CAD: CPT

## 2023-06-22 ENCOUNTER — HOSPITAL ENCOUNTER (OUTPATIENT)
Dept: MRI IMAGING | Facility: HOSPITAL | Age: 43
End: 2023-06-22
Payer: COMMERCIAL

## 2023-06-22 DIAGNOSIS — R22.42 MASS OF LEFT FOOT: ICD-10-CM

## 2023-06-22 PROCEDURE — A9585 GADOBUTROL INJECTION: HCPCS | Performed by: RADIOLOGY

## 2023-06-22 PROCEDURE — G1004 CDSM NDSC: HCPCS

## 2023-06-22 PROCEDURE — 73723 MRI JOINT LWR EXTR W/O&W/DYE: CPT

## 2023-06-22 RX ADMIN — GADOBUTROL 7.5 ML: 604.72 INJECTION INTRAVENOUS at 14:41

## 2023-07-05 NOTE — PROGRESS NOTES
Assessment        Diagnoses and all orders for this visit:    Encntr for gyn exam (general) (routine) w/o abn findings    S/P endometrial ablation    Encounter for screening mammogram for malignant neoplasm of breast  -     Mammo screening bilateral w 3d & cad; Future    Cervical cancer screening  -     Liquid-based pap, screening             Plan      All questions answered. Await pap smear results. Contraception: vasectomy. Follow up in 1 year. Follow up as needed. Mammogram.  Pap smear. L breast pain - resolved - advised to call if recurrent  Perineal skin change reported by patient (abrasion) but not present at this time, if recurrent, will need to biopsy vulva      Subjective      Andrew Reddy is a 36 y.o. female who presents for annual exam.      Chief Complaint   Patient presents with   • Gynecologic Exam     Pt reports no concerns       She has infrequent periods s/p ablation, had a period in November and in May    L breast pain resovled    Last Pap: 2020  Last mammogram: 23  Colorectal cancer screening: n/a     HPV vaccine completed:no  Current contraception: vasectomy  History of abnormal Pap smear: no  History of abnormal mammogram: yes - nodule  Family history of uterine or ovarian cancer: no  Family history of breast cancer: yes - mother at age 61  Family history of colon cancer: no       OB History    Para Term  AB Living   2 2 2 0 0 2   SAB IAB Ectopic Multiple Live Births   0 0 0 0 2      # Outcome Date GA Lbr Chao/2nd Weight Sex Delivery Anes PTL Lv   2 Term      Vag-Spont      1 Term      Vag-Spont          Menstrual History:  OB History        2    Para   2    Term   2       0    AB   0    Living   2       SAB   0    IAB   0    Ectopic   0    Multiple   0    Live Births   2                Menarche age: 5  Patient's last menstrual period was 2023.              Past Medical History:   Diagnosis Date   • Anemia    • Disease of thyroid gland hyperthyroid   • Foot pain, left 2013    She fractured a bone in her left foot. Had severe pain associated with redness. Was given the diagnosis of reflex sympathetic dystrophy. Has had nerve blocks which were ineffective. • Galactorrhea in female 11/10/2015   • Genital warts    • Graves' disease 7/3/2018    TSI positive. Intolerant of TPU. Switched to methimazole 5 mg daily by endocrinologist.  TSH in June of 2018 0.005 with normal T3 & T4. Followed by [facility]ology [facility] Dr. Jeffrey Simon. • Hepatitis B 1998    Diagnosed with acute Hepatitis B. Subsequent Hep B Surface Ag negative, Surface Ab positive. HCV negative, HIV negative 2010. • History of breast lump    • Hypertrophic scar 07/12/2012   • Insomnia    • Iron deficiency anemia due to chronic blood loss 7/3/2018     Hemoglobin 11.4 in June of 2018 with a ferritin of 10. Presumed secondary to menstrual losses. • Kidney stone     Has passed several kidney stones. No prior kidney stone workup. Try to stay well hydrated. No kidney stones for several years.    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   • Wears glasses      Past Surgical History:   Procedure Laterality Date   • APPENDECTOMY  2001   • BREAST BIOPSY Left     benign- ultrasound biopsy- 2019   • BREAST IMPLANT Bilateral 2008   • CHOLECYSTECTOMY  2004   • ENDOMETRIAL ABLATION N/A 05/18/2021    Procedure: Silverio Valera;  Surgeon: Suzette Ledezma MD;  Location: [facility];  Service: Gynecology   • FOOT SURGERY  2013   • WA HYSTEROSCOPY BX ENDOMETRIUM&/POLYPC W/WO D&C N/A 05/18/2021    Procedure: D&C W/ HYSTEROSCOPY;  Surgeon: Suzette Ledezma MD;  Location: [facility];  Service: Gynecology   • REVISION OF SCAR  02/18/2012    appendectomy scar revised   • US GUIDED BREAST BIOPSY LEFT COMPLETE Left 04/2019     Family History   Problem Relation Age of Onset   • Breast cancer Mother 61   • Thyroid disease Mother    • Iron deficiency Mother    • Heart disease Mother    • Leukemia Mother 72   • Carpal tunnel syndrome Father    • Thyroid disease Sister    • No Known Problems Daughter    • No Known Problems Daughter    • No Known Problems Daughter    • Thyroid disease Maternal Grandmother    • Diabetes Maternal Grandmother    • Heart disease Maternal Grandfather    • Prostate cancer Maternal Grandfather    • Pancreatic cancer Maternal Grandfather    • Cervical cancer Paternal Grandmother    • Heart disease Paternal Grandmother    • Endometrial cancer Paternal Grandmother    • Heart disease Paternal Grandfather    • Hypertension Paternal Grandfather    • Lung cancer Paternal Grandfather    • Liver cancer Paternal Grandfather    • Heart disease Maternal Aunt    • Thyroid disease Maternal Aunt    • Thyroid disease Maternal Aunt    • No Known Problems Paternal Aunt    • No Known Problems Paternal Aunt        Social History     Tobacco Use   • Smoking status: Never   • Smokeless tobacco: Never   Vaping Use   • Vaping Use: Never used   Substance Use Topics   • Alcohol use: Not Currently     Comment: rare, few times a year   • Drug use: No     Comment: 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,describes self as recovering addict          Current Outpatient Medications:   •  albuterol (PROVENTIL HFA,VENTOLIN HFA) 90 mcg/act inhaler, INHALE 2 PUFFS INTO THE LUNGS EVERY 4 HOURS AS NEEDED FOR WHEEZING OR SHORTNESS OF BREATH., Disp: , Rfl:   •  Ferrous Sulfate (IRON PO), Take 64 mg by mouth every other day, Disp: , Rfl:   •  HYDROcodone-acetaminophen (NORCO)  mg per tablet, Take 1 tablet by mouth every 6 (six) hours as needed, Disp: , Rfl:   •  methimazole (TAPAZOLE) 5 mg tablet, Take 1 tablet (5 mg total) by mouth in the morning, Disp: 90 tablet, Rfl: 3  •  Multiple Vitamin (MULTI-VITAMIN DAILY PO), Take by mouth, Disp: , Rfl:   •  zolpidem (AMBIEN) 10 mg tablet, TAKE 1 TABLET BY MOUTH DAILY AT BEDTIME AS NEEDED FOR SLEEP, Disp: 30 tablet, Rfl: 0  •  benzonatate (TESSALON PERLES) 100 mg capsule, TAKE 1 CAPSULE BY MOUTH THREE TIMES A DAY AS NEEDED FOR COUGH, Disp: , Rfl:   •  [employer] Saline Nasal [place] 0.65 % nasal spray, SPRAY 1 SPRAY INTO EACH NOSTRIL EVERY 3 HOURS AS NEEDED FOR CONGESTION, Disp: , Rfl:   •  DULoxetine (CYMBALTA) 60 mg delayed release capsule, Take 1 capsule (60 mg total) by mouth daily, Disp: 90 capsule, Rfl: 0  •  Flowflex COVID-19 Ag Home Test KIT, TESTING, Disp: , Rfl:   •  fluticasone (FLONASE) 50 mcg/act nasal spray, SPRAY 1 SPRAY INTO INTO EACH NOSTRIL TWICE A DAY FOR 10 DAYS, Disp: , Rfl:   •  HYDROcodone-acetaminophen (NORCO)  mg per tablet, Take 1 tablet by mouth every 6 (six) hours as needed, Disp: , Rfl:   •  LORATADINE PO, Take by mouth (Patient not taking: Reported on 7/6/2023), Disp: , Rfl:   •  multivitamin (THERAGRAN) TABS, Take 1 tablet by mouth daily, Disp: , Rfl:   •  neomycin-polymyxin-hydrocortisone (CORTISPORIN) 0.35%-10,000 units/mL-1% otic suspension, Administer 4 drops into the left ear 4 (four) times a day for 7 days, Disp: 10 mL, Rfl: 0  •  predniSONE 20 mg tablet, TAKE 1 TABLET (20 MG TOTAL) BY MOUTH DAILY FOR 8 DAYS., Disp: , Rfl:     Allergies   Allergen Reactions   • Sulfa Antibiotics Other (See Comments)     Worsens UTI symptoms   • Aspirin Rash and GI Intolerance   • Penicillins Rash and GI Intolerance           Review of Systems   Constitutional: Negative. HENT: Negative. Eyes: Negative. Respiratory: Negative. Cardiovascular: Negative. Gastrointestinal: Negative. Endocrine: Negative. Genitourinary:        As noted in HPI   Musculoskeletal: Negative. Skin: Negative. Allergic/Immunologic: Negative. Neurological: Negative. Hematological: Negative. Psychiatric/Behavioral: Negative.         /68 (BP Location: Right arm, Patient Position: Sitting, Cuff Size: Adult)   Pulse 75   Temp 98 °F (36.7 °C) (Tympanic)   Ht 5' 11" (1.803 m)   Wt 79.6 kg (175 lb 6.4 oz) LMP 05/03/2023 Comment: denies pregnancy - ablation - 2021  BMI 24.46 kg/m²         Physical Exam  Constitutional:       Appearance: She is well-developed. Genitourinary:      Vulva, bladder and rectum normal.      No lesions in the vagina. Genitourinary Comments:         Right Labia: No rash, tenderness, lesions, skin changes or Bartholin's cyst.     Left Labia: No tenderness, lesions, skin changes, Bartholin's cyst or rash. No inguinal adenopathy present in the right or left side. No vaginal discharge, tenderness or bleeding. No vaginal prolapse present. No vaginal atrophy present. Right Adnexa: not tender, not full and no mass present. Left Adnexa: not tender, not full and no mass present. No cervical motion tenderness, friability, lesion or polyp. Uterus is not enlarged or tender. Pelvic exam was performed with patient in the lithotomy position. Rectum:      No external hemorrhoid. Breasts:     Right: Breast implant present. No mass, nipple discharge, skin change or tenderness. Left: Breast implant present. No mass, nipple discharge, skin change or tenderness. HENT:      Head: Normocephalic. Nose: Nose normal.   Eyes:      Conjunctiva/sclera: Conjunctivae normal.   Neck:      Thyroid: No thyromegaly. Cardiovascular:      Rate and Rhythm: Normal rate and regular rhythm. Heart sounds: Normal heart sounds. No murmur heard. Pulmonary:      Effort: Pulmonary effort is normal. No respiratory distress. Breath sounds: Normal breath sounds. No wheezing or rales. Abdominal:      General: There is no distension. Palpations: Abdomen is soft. There is no mass. Tenderness: There is no abdominal tenderness. There is no guarding or rebound. Musculoskeletal:         General: No tenderness. Cervical back: Neck supple. No muscular tenderness. Lymphadenopathy:      Cervical: No cervical adenopathy.       Lower Body: No right inguinal adenopathy. No left inguinal adenopathy. Neurological:      Mental Status: She is alert and oriented to person, place, and time. Skin:     General: Skin is warm and dry.    Psychiatric:         Mood and Affect: Mood normal.         Behavior: Behavior normal.             Future Appointments   Date Time Provider [address]   7/6/2023  8:30 AM Marnie Agarwal MD [facility]   7/21/2023  8:30 AM Olga Painter MD [facility]   6/24/2024  8:00 AM [facility]

## 2023-07-05 NOTE — PATIENT INSTRUCTIONS
Wellness Visit for Adults   AMBULATORY CARE:   A wellness visit  is when you see your healthcare provider to get screened for health problems. Your healthcare provider will also give you advice on how to stay healthy. Write down your questions so you remember to ask them. Ask your healthcare provider how often you should have a wellness visit. What happens at a wellness visit:  Your healthcare provider will ask about your health, and your family history of health problems. This includes high blood pressure, heart disease, and cancer. He or she will ask if you have symptoms that concern you, if you smoke, and about your mood. You may also be asked about your intake of medicines, supplements, food, and alcohol. Any of the following may be done: Your weight  will be checked. Your height may also be checked so your body mass index (BMI) can be calculated. Your BMI shows if you are at a healthy weight. Your blood pressure  and heart rate will be checked. Your temperature may also be checked. Blood and urine tests  may be done. Blood tests may be done to check your cholesterol levels. Abnormal cholesterol levels increase your risk for heart disease and stroke. You may also need a blood or urine test to check for diabetes if you are at increased risk. Urine tests may be done to look for signs of an infection or kidney disease. A physical exam  includes checking your heartbeat and lungs with a stethoscope. Your healthcare provider may also check your skin to look for sun damage. Screening tests  may be recommended. A screening test is done to check for diseases that may not cause symptoms. The screening tests you may need depend on your age, gender, family history, and lifestyle habits. For example, colorectal screening may be recommended if you are 48years old or older. Screening tests you need if you are a woman:   A Pap smear  is used to screen for cervical cancer.  Pap smears are usually done every 3 to 5 years depending on your age. You may need them more often if you have had abnormal Pap smear test results in the past. Ask your healthcare provider how often you should have a Pap smear. A mammogram  is an x-ray of your breasts to screen for breast cancer. Experts recommend mammograms every 2 years starting at age 48 years. You may need a mammogram at age 52 years or younger if you have an increased risk for breast cancer. Talk to your healthcare provider about when you should start having mammograms and how often you need them. Vaccines you may need:   Get an influenza vaccine  every year. The influenza vaccine protects you from the flu. Several types of viruses cause the flu. The viruses change over time, so new vaccines are made each year. Get a tetanus-diphtheria (Td) booster vaccine  every 10 years. This vaccine protects you against tetanus and diphtheria. Tetanus is a severe infection that may cause painful muscle spasms and lockjaw. Diphtheria is a severe bacterial infection that causes a thick covering in the back of your mouth and throat. Get a human papillomavirus (HPV) vaccine  if you are female and aged 23 to 32 or male 23 to 24 and never received it. This vaccine protects you from HPV infection. HPV is the most common infection spread by sexual contact. HPV may also cause vaginal, penile, and anal cancers. Get a pneumococcal vaccine  if you are aged 72 years or older. The pneumococcal vaccine is an injection given to protect you from pneumococcal disease. Pneumococcal disease is an infection caused by pneumococcal bacteria. The infection may cause pneumonia, meningitis, or an ear infection. Get a shingles vaccine  if you are 60 or older, even if you have had shingles before. The shingles vaccine is an injection to protect you from the varicella-zoster virus. This is the same virus that causes chickenpox.  Shingles is a painful rash that develops in people who had chickenpox or have been exposed to the virus. How to eat healthy:  My Plate is a model for planning healthy meals. It shows the types and amounts of foods that should go on your plate. Fruits and vegetables make up about half of your plate, and grains and protein make up the other half. A serving of dairy is included on the side of your plate. The amount of calories and serving sizes you need depends on your age, gender, weight, and height. Examples of healthy foods are listed below:  Eat a variety of vegetables  such as dark green, red, and orange vegetables. You can also include canned vegetables low in sodium (salt) and frozen vegetables without added butter or sauces. Eat a variety of fresh fruits , canned fruit in 100% juice, frozen fruit, and dried fruit. Include whole grains. At least half of the grains you eat should be whole grains. Examples include whole-wheat bread, wheat pasta, brown rice, and whole-grain cereals such as oatmeal.    Eat a variety of protein foods such as seafood (fish and shellfish), lean meat, and poultry without skin (turkey and chicken). Examples of lean meats include pork leg, shoulder, or tenderloin, and beef round, sirloin, tenderloin, and extra lean ground beef. Other protein foods include eggs and egg substitutes, beans, peas, soy products, nuts, and seeds. Choose low-fat dairy products such as skim or 1% milk or low-fat yogurt, cheese, and cottage cheese. Limit unhealthy fats  such as butter, hard margarine, and shortening. Exercise:  Exercise at least 30 minutes per day on most days of the week. Some examples of exercise include walking, biking, dancing, and swimming. You can also fit in more physical activity by taking the stairs instead of the elevator or parking farther away from stores. Include muscle strengthening activities 2 days each week. Regular exercise provides many health benefits.  It helps you manage your weight, and decreases your risk for type 2 diabetes, heart disease, stroke, and high blood pressure. Exercise can also help improve your mood. Ask your healthcare provider about the best exercise plan for you. General health and safety guidelines:   Do not smoke. Nicotine and other chemicals in cigarettes and cigars can cause lung damage. Ask your healthcare provider for information if you currently smoke and need help to quit. E-cigarettes or smokeless tobacco still contain nicotine. Talk to your healthcare provider before you use these products. Limit alcohol. A drink of alcohol is 12 ounces of beer, 5 ounces of wine, or 1½ ounces of liquor. Lose weight, if needed. Being overweight increases your risk of certain health conditions. These include heart disease, high blood pressure, type 2 diabetes, and certain types of cancer. Protect your skin. Do not sunbathe or use tanning beds. Use sunscreen with a SPF 15 or higher. Apply sunscreen at least 15 minutes before you go outside. Reapply sunscreen every 2 hours. Wear protective clothing, hats, and sunglasses when you are outside. Drive safely. Always wear your seatbelt. Make sure everyone in your car wears a seatbelt. A seatbelt can save your life if you are in an accident. Do not use your cell phone when you are driving. This could distract you and cause an accident. Pull over if you need to make a call or send a text message. Practice safe sex. Use latex condoms if are sexually active and have more than one partner. Your healthcare provider may recommend screening tests for sexually transmitted infections (STIs). Wear helmets, lifejackets, and protective gear. Always wear a helmet when you ride a bike or motorcycle, go skiing, or play sports that could cause a head injury. Wear protective equipment when you play sports. Wear a lifejacket when you are on a boat or doing water sports.     © Copyright [employer] 2022 Information is for End User's use only and may not be sold, redistributed or otherwise used for commercial purposes. The above information is an  only. It is not intended as medical advice for individual conditions or treatments. Talk to your doctor, nurse or pharmacist before following any medical regimen to see if it is safe and effective for you.

## 2023-07-06 ENCOUNTER — ANNUAL EXAM (OUTPATIENT)
Dept: OBGYN CLINIC | Facility: CLINIC | Age: 43
End: 2023-07-06
Payer: COMMERCIAL

## 2023-07-06 VITALS
BODY MASS INDEX: 24.56 KG/M2 | DIASTOLIC BLOOD PRESSURE: 68 MMHG | SYSTOLIC BLOOD PRESSURE: 122 MMHG | HEIGHT: 71 IN | TEMPERATURE: 98 F | HEART RATE: 75 BPM | WEIGHT: 175.4 LBS

## 2023-07-06 DIAGNOSIS — Z01.419 ENCNTR FOR GYN EXAM (GENERAL) (ROUTINE) W/O ABN FINDINGS: Primary | ICD-10-CM

## 2023-07-06 DIAGNOSIS — Z12.31 ENCOUNTER FOR SCREENING MAMMOGRAM FOR MALIGNANT NEOPLASM OF BREAST: ICD-10-CM

## 2023-07-06 DIAGNOSIS — Z98.890 S/P ENDOMETRIAL ABLATION: ICD-10-CM

## 2023-07-06 DIAGNOSIS — Z12.4 CERVICAL CANCER SCREENING: ICD-10-CM

## 2023-07-06 PROCEDURE — G0145 SCR C/V CYTO,THINLAYER,RESCR: HCPCS | Performed by: OBSTETRICS & GYNECOLOGY

## 2023-07-06 PROCEDURE — G0476 HPV COMBO ASSAY CA SCREEN: HCPCS | Performed by: OBSTETRICS & GYNECOLOGY

## 2023-07-06 PROCEDURE — S0612 ANNUAL GYNECOLOGICAL EXAMINA: HCPCS | Performed by: OBSTETRICS & GYNECOLOGY

## 2023-07-11 LAB
LAB AP GYN PRIMARY INTERPRETATION: NORMAL
Lab: NORMAL

## 2023-07-21 ENCOUNTER — OFFICE VISIT (OUTPATIENT)
Dept: PAIN MEDICINE | Facility: CLINIC | Age: 43
End: 2023-07-21
Payer: COMMERCIAL

## 2023-07-21 VITALS
TEMPERATURE: 97.8 F | WEIGHT: 175 LBS | DIASTOLIC BLOOD PRESSURE: 72 MMHG | HEART RATE: 78 BPM | HEIGHT: 71 IN | BODY MASS INDEX: 24.5 KG/M2 | RESPIRATION RATE: 20 BRPM | SYSTOLIC BLOOD PRESSURE: 120 MMHG

## 2023-07-21 DIAGNOSIS — M54.12 CERVICAL RADICULOPATHY: Primary | ICD-10-CM

## 2023-07-21 PROCEDURE — 99214 OFFICE O/P EST MOD 30 MIN: CPT | Performed by: ANESTHESIOLOGY

## 2023-07-21 NOTE — PATIENT INSTRUCTIONS
Neck Exercises   AMBULATORY CARE:   Neck exercises  help reduce neck pain, and improve neck movement and strength. Neck exercises also help prevent long-term neck problems. Call your doctor if:   Your pain does not get better, or gets worse. You have questions or concerns about your condition, care, or exercise program.    What you need to know about exercise safety:   Move slowly, gently, and smoothly. Avoid fast or jerky motions. Stand and sit the way your healthcare provider shows you. Good posture may reduce your neck pain. Check your posture often, even when you are not doing your neck exercises. Follow the exercise program recommended by your healthcare provider. He or she will tell you which exercises are best for your condition. He or she will also tell you how many repetitions to do and how often you should do the exercises. How to perform neck exercises safely:   Exercise position:  You may sit or stand while you do neck exercises. Face forward. Your shoulders should be straight and relaxed, with a good posture. Head tilts, forward and back:  Gently bow your head and try to touch your chin to your chest. Your healthcare provider may tell you to push on the back of your neck to help bow your head. Raise your chin back to the starting position. Tilt your head back as far as possible so you are looking up at the ceiling. Your healthcare provider may tell you to lift your chin to help tilt your head back. Return your head to the starting position. Head tilts, side to side:  Tilt your head, bringing your ear toward your shoulder. Then tilt your head toward the other shoulder. Head turns:  Turn your head to look over your shoulder. Tilt your chin down and try to touch it to your shoulder. Do not raise your shoulder to your chin. Face forward again. Do the same on the other side.          Head rolls:  Slowly bring your chin toward your chest. Next, roll your head to the right. Your ear should be positioned over your shoulder. Hold this position for 5 seconds. Roll your head back toward your chest and to the left into the same position. Hold for 5 seconds. Gently roll your head back and around in a clockwise [place] 3 times. Next, move your head in the reverse direction (counterclockwise) in a [place] 3 times. Do not shrug your shoulders upwards while you do this exercise. Follow up with your doctor as directed:  Write down your questions so you remember to ask them during your visits. © Copyright [employer] 2022 Information is for End User's use only and may not be sold, redistributed or otherwise used for commercial purposes. The above information is an  only. It is not intended as medical advice for individual conditions or treatments. Talk to your doctor, nurse or pharmacist before following any medical regimen to see if it is safe and effective for you.

## 2023-07-21 NOTE — PROGRESS NOTES
Assessment  1. Cervical radiculopathy  -     MRI cervical spine without contrast; Future; Expected date: 07/21/2023    Left sided neck pain described by myofascial component; trigger point palpable overlying left trapezius muscle where lipoma found to be underlying. Difficulty with neck mobility, alanis with right neck motion. Palpable tenderness to palpation with extension of pain proximally and distally in this region. Xray noncontributory; CT neck soft tissue noncontributory. Had visit at [facility] and discussed removal of lipomas with surgery. Recommended non-operative intervention/referral to podiatry and investigating possible pinched nerves in cervical spine. Similarly patient has lipoma overlying sole of right foot near heel. Mobile 0.5cm fluctuant lipoma. Lateral heel discoloration from recent trauma. Extensively discussed treatment options including increasing cymbalta, possible botox injections for cervical dystonia given past failure of trigger points, PT, opioid medications. Risks, benefits and alternatives discussed    Plan  -Mri cervical spine noncontrast; will f/u result  -cymbalta discontinued given sedative effects of taking this medication  -Manual lymphatic drainage PT rx. Physician directed home exercise plan as per [employer] demonstrated and handouts provided that patient plans to participate with for 1 hour, twice a week for the next 6 weeks. There are risks associated with opioid medications, including dependence, addiction and tolerance. The patient understands and agrees to use these medications only as prescribed. Potential side effects of the medications include, but are not limited to, constipation, drowsiness, addiction, impaired judgment and risk of fatal overdose if not taken as prescribed. The patient was warned against driving while taking sedation medications or operating heavy machinery. The patient voiced understanding. Sharing medications is a felony.  At this point in time, the patient is showing no signs of addiction, abuse, diversion or suicidal ideation. [place] Prescription Drug Monitoring Program report was reviewed and was appropriate      Complete risks and benefits including bleeding, infection, tissue reaction, nerve injury and allergic reaction were discussed. The approach was demonstrated using models and literature was provided. Verbal and written consent was obtained. My impressions and treatment recommendations were discussed in detail with the patient who verbalized understanding and had no further questions. Discharge instructions were provided. I personally saw and examined the patient and I agree with the above discussed plan of care. New Medications Ordered This Visit   Medications   • VITAMIN D PO     Sig: Take by mouth       History of Present Illness    Donis Parks is a 39 y.o. female  With pmhx of graves disease, kidney stones presenting with neck pain described by primarily myofascial pain features. Achy, nagging, indolent, crampy and throbbing pain in left trapezius muscles that worsens as the day progresses. Similarly has throbbing pain in right heel where lipoma palpable at right heel. Tramadol had decreased this pain nearly 40-50%. In the past she has tried PT efforts, nsaids and tylenol, cymbalta without significant relief. 8/10 pain, restricted range of motion with left sided neck articulation. Denies any radicular features of pain at this time. Denies any bowel/bladder abnormality, gait abnormality, saddle anesthesia. I have personally reviewed and/or updated the patient's past medical history, past surgical history, family history, social history, current medications, allergies, and vital signs today. Review of Systems   Constitutional: Positive for activity change. HENT: Negative. Eyes: Negative. Respiratory: Negative. Cardiovascular: Negative. Gastrointestinal: Negative. Endocrine: Negative. Genitourinary: Negative. Musculoskeletal: Positive for arthralgias, back pain, myalgias, neck pain and neck stiffness. Negative for gait problem. Skin: Negative. Allergic/Immunologic: Negative. Neurological: Negative for weakness and numbness. Hematological: Negative. Psychiatric/Behavioral: Negative. All other systems reviewed and are negative. Patient Active Problem List   Diagnosis   • Iron deficiency anemia due to chronic blood loss   • Graves' disease   • Other insomnia   • Foot pain, left   • Kidney stones   • Muscle cramp   • Galactorrhea   • Decreased libido   • Left breast mass   • Menorrhagia with regular cycle   • Lipoma of back   • Mild intermittent reactive airway disease without complication   • Neck pain   • Multiple thyroid nodules   • Myofascial pain syndrome   • Cervical dystonia       Past Medical History:   Diagnosis Date   • Anemia    • Disease of thyroid gland     hyperthyroid   • Foot pain, left 2013    She fractured a bone in her left foot. Had severe pain associated with redness. Was given the diagnosis of reflex sympathetic dystrophy. Has had nerve blocks which were ineffective. • Galactorrhea in female 11/10/2015   • Genital warts    • Graves' disease 7/3/2018    TSI positive. Intolerant of TPU. Switched to methimazole 5 mg daily by endocrinologist.  TSH in June of 2018 0.005 with normal T3 & T4. Followed by endocrinology at [facility] Dr. Mónica Phelps. • Hepatitis B 1998    Diagnosed with acute Hepatitis B. Subsequent Hep B Surface Ag negative, Surface Ab positive. HCV negative, HIV negative 2010. • History of breast lump    • Hypertrophic scar 07/12/2012   • Insomnia    • Iron deficiency anemia due to chronic blood loss 7/3/2018     Hemoglobin 11.4 in June of 2018 with a ferritin of 10. Presumed secondary to menstrual losses. • Kidney stone     Has passed several kidney stones. No prior kidney stone workup. Try to stay well hydrated.   No kidney stones for several years.    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   • Wears glasses        Past Surgical History:   Procedure Laterality Date   • APPENDECTOMY  2001   • BREAST BIOPSY Left     benign- ultrasound biopsy- 2019   • BREAST IMPLANT Bilateral 2008   • CHOLECYSTECTOMY  2004   • ENDOMETRIAL ABLATION N/A 05/18/2021    Procedure: Alex Castellano;  Surgeon: Ana Laura Padilla MD;  [facility];  Service: Gynecology   • FOOT SURGERY  2013   • DC HYSTEROSCOPY BX ENDOMETRIUM&/POLYPC W/WO D&C N/A 05/18/2021    Procedure: D&C W/ HYSTEROSCOPY;  Surgeon: Ana Laura Padilla MD;  [facility];  Service: Gynecology   • REVISION OF SCAR  02/18/2012    appendectomy scar revised   • US GUIDED BREAST BIOPSY LEFT COMPLETE Left 04/2019       Family History   Problem Relation Age of Onset   • Breast cancer Mother 61   • Thyroid disease Mother    • Iron deficiency Mother    • Heart disease Mother    • Leukemia Mother 72   • Carpal tunnel syndrome Father    • Thyroid disease Sister    • No Known Problems Daughter    • No Known Problems Daughter    • No Known Problems Daughter    • Thyroid disease Maternal Grandmother    • Diabetes Maternal Grandmother    • Heart disease Maternal Grandfather    • Prostate cancer Maternal Grandfather    • Pancreatic cancer Maternal Grandfather    • Cervical cancer Paternal Grandmother    • Heart disease Paternal Grandmother    • Endometrial cancer Paternal Grandmother    • Heart disease Paternal Grandfather    • Hypertension Paternal Grandfather    • Lung cancer Paternal Grandfather    • Liver cancer Paternal Grandfather    • Heart disease Maternal Aunt    • Thyroid disease Maternal Aunt    • Thyroid disease Maternal Aunt    • No Known Problems Paternal Aunt    • No Known Problems Paternal Aunt        Social History     Occupational History   • Not on file   Tobacco Use   • Smoking status: Never   • Smokeless tobacco: Never   Vaping Use   • Vaping Use: Never used   Substance and Sexual Activity   • Alcohol use: Not Currently     Comment: rare, few times a year   • Drug use: No     Comment: 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,describes self as recovering addict   • Sexual activity: Yes     Partners: Male     Birth control/protection: Male Sterilization       Current Outpatient Medications on File Prior to Visit   Medication Sig   • Ferrous Sulfate (IRON PO) Take 64 mg by mouth every other day   • Flowflex COVID-19 Ag Home Test KIT TESTING   • HYDROcodone-acetaminophen (NORCO)  mg per tablet Take 1 tablet by mouth every 6 (six) hours as needed   • methimazole (TAPAZOLE) 5 mg tablet Take 1 tablet (5 mg total) by mouth in the morning   • Multiple Vitamin (MULTI-VITAMIN DAILY PO) Take by mouth   • multivitamin (THERAGRAN) TABS Take 1 tablet by mouth daily   • VITAMIN D PO Take by mouth   • zolpidem (AMBIEN) 10 mg tablet TAKE 1 TABLET BY MOUTH DAILY AT BEDTIME AS NEEDED FOR SLEEP   • albuterol (PROVENTIL HFA,VENTOLIN HFA) 90 mcg/act inhaler INHALE 2 PUFFS INTO THE LUNGS EVERY 4 HOURS AS NEEDED FOR WHEEZING OR SHORTNESS OF BREATH.  (Patient not taking: Reported on 7/21/2023)   • benzonatate (TESSALON PERLES) 100 mg capsule TAKE 1 CAPSULE BY MOUTH THREE TIMES A DAY AS NEEDED FOR COUGH (Patient not taking: Reported on 7/21/2023)   • [employer] Saline Nasal [place] 0.65 % nasal spray SPRAY 1 SPRAY INTO EACH NOSTRIL EVERY 3 HOURS AS NEEDED FOR CONGESTION (Patient not taking: Reported on 7/21/2023)   • DULoxetine (CYMBALTA) 60 mg delayed release capsule Take 1 capsule (60 mg total) by mouth daily (Patient not taking: Reported on 7/21/2023)   • fluticasone (FLONASE) 50 mcg/act nasal spray SPRAY 1 SPRAY INTO INTO EACH NOSTRIL TWICE A DAY FOR 10 DAYS (Patient not taking: Reported on 7/21/2023)   • HYDROcodone-acetaminophen (NORCO)  mg per tablet Take 1 tablet by mouth every 6 (six) hours as needed (Patient not taking: Reported on 7/21/2023)   • LORATADINE PO Take by mouth (Patient not taking: Reported on 7/6/2023)   • neomycin-polymyxin-hydrocortisone (CORTISPORIN) 0.35%-10,000 units/mL-1% otic suspension Administer 4 drops into the left ear 4 (four) times a day for 7 days   • predniSONE 20 mg tablet TAKE 1 TABLET (20 MG TOTAL) BY MOUTH DAILY FOR 8 DAYS. (Patient not taking: Reported on 7/21/2023)     No current facility-administered medications on file prior to visit. Allergies   Allergen Reactions   • Sulfa Antibiotics Other (See Comments)     Worsens UTI symptoms   • Aspirin Rash and GI Intolerance   • Penicillins Rash and GI Intolerance     Physical Exam    /72   Pulse 78   Temp 97.8 °F (36.6 °C)   Resp 20   Ht 5' 11" (1.803 m)   Wt 79.4 kg (175 lb)   BMI 24.41 kg/m²     Constitutional: normal, well developed, well nourished, alert, in no distress and non-toxic and no overt pain behavior. Eyes: anicteric  HEENT: grossly intact  Neck: supple, symmetric, trachea midline and no masses   Pulmonary:even and unlabored  Cardiovascular:No edema or pitting edema present  Skin:Normal without rashes or lesions and well hydrated  Psychiatric:Mood and affect appropriate  Neurologic:Cranial Nerves II-XII grossly intact Sensation grossly intact; no clonus negative puga's. Reflexes 2+ and brisk. Spurling's maneuver negative bilaterally. Musculoskeletal:normal gait. 5/5 strength bilaterally with AROM in upper extremities. Significant pain with cervical facet loading bilaterally and with lateral spine rotation. TTP over cervical paraspinal muscles. Negative bree's test, negative gaenslen's negative SIJ loading bilaterally. Imaging    CERVICAL SPINE     INDICATION:   M54.2: Cervicalgia.     COMPARISON:  Compared with 2/21/2022     VIEWS:  XR SPINE CERVICAL 2 OR 3 VW INJURY         FINDINGS:     No acute fracture or subluxation.      Straightening of the usual cervical lordosis.     Intervertebral disc heights are preserved.    Normal prevertebral soft tissues.       Clear lung apices.     IMPRESSION:     No acute osseous abnormality.        Workstation performed: [id number]

## 2023-10-31 ENCOUNTER — TELEMEDICINE (OUTPATIENT)
Dept: OBGYN CLINIC | Facility: CLINIC | Age: 43
End: 2023-10-31

## 2023-10-31 DIAGNOSIS — R23.2 HOT FLASHES: Primary | ICD-10-CM

## 2023-10-31 RX ORDER — ESTRADIOL/NORETHINDRONE ACETATE TRANSDERMAL SYSTEM .05; .25 MG/D; MG/D
1 PATCH, EXTENDED RELEASE TRANSDERMAL 2 TIMES WEEKLY
Qty: 8 PATCH | Refills: 1 | Status: SHIPPED | OUTPATIENT
Start: 2023-11-02

## 2023-10-31 NOTE — PROGRESS NOTES
Vasomotor symptoms occur up to 80% of women an most women can describe them as severe. Most symptoms occur in the transition period but can also happen in late and postmenopausal period. Mean duration is about 5 years but symptoms can last even up to 10 years after the final menstrual period. General behavioral measures include lowering room temperature, layering of clothes, removal of triggers such as spicy foods or stress. Women with moderate to severe symptoms may need medication in the form or hormonal therapy or non hormonal therapy    For women interested in hormonal therapy, we determine specific risks such as personal h/o breast cancer, h/o VTE or stroke, smoking. Discussed with patient menopausal  hormone, therapy weighing risks versus benefits. Risks associated with hormone replacement may include stroke, VTE, cardiovascular disease, breast cancer, endometrial hyperplasia and carcinoma. Discussed with the primary goal is to relieve hot flashes. The other symptoms associated with menopausal symptoms include sleep disturbances, mood disorders, and in some instances joint aches and pains. .  Discussed that women being treated for menopausal symptoms such as hot flashes require systemic/oral estrogen while women w/ vaginal atrophy should be treated with topical/vaginal estrogen. Transdermal estrogen is also preferred with women with migraine headaches. Discussed at the lowest effective dose should be used. Typical dosages start with oral estradiol 1 mg per day or transdermal 0.05 mg per day. Typically, if hot flashes or completely relieved and patient is tolerating hormone replacement, this regimen is continued for several years and start tapering off sometime between 3 and 5 years.         Virtual Regular Visit    Verification of patient location:    Patient is located at Home in the following state in which I hold an active license [place]      Assessment/Plan:    Problem List Items Addressed This Visit    None  Visit Diagnoses       Hot flashes    -  Primary    Relevant Medications    estradiol-norethindrone (CombiPatch) 0.05-0.25 MG/DAY (Start on 11/2/2023)    Other Relevant Orders    Follicle stimulating hormone          CombiPatch was ordered for patient. Advised patient follow-up in 6-8 weeks  Advised patient to call if noncoverage of medication. Discussed with patient safe and effective use and to call if with intolerable side effects. Reason for visit is   Chief Complaint   Patient presents with    Virtual Regular Visit          Encounter provider Tata Cho MD    Provider located at [address] [place] [id number] [phone]      Recent Visits  No visits were found meeting these conditions. Showing recent visits within past 7 days and meeting all other requirements  Today's Visits  Date Type Provider Dept   10/31/23 Reshma Grey MD [facility]   Showing today's visits and meeting all other requirements  Future Appointments  No visits were found meeting these conditions. Showing future appointments within next 150 days and meeting all other requirements       The patient was identified by name and date of birth. Jewel Hardy was informed that this is a telemedicine visit and that the visit is being conducted through the [employer]. She agrees to proceed. .  My office door was closed. No one else was in the room. She acknowledged consent and understanding of privacy and security of the video platform. The patient has agreed to participate and understands they can discontinue the visit at any time. Patient is aware this is a billable service. Subjective  Jewel Hardy is a 39 y.o. female  . HPI   Patient complaining of hot flashes several times an hour. She has tried Estroven without relief.   She also reports night sweats and difficulty maintaining sleep. Patient with history of endometrial ablation in May 2022. She reports very infrequent periods and has only had 5 periods since May 2022. She reports. Last for about 1 day and brown spotting is only noticed. She has noticed some vaginal dryness although not bothersome. She is not a smoker. She reports no history of migraines currently. She reports no history of DVT or PE    Past Medical History:   Diagnosis Date    Anemia     Disease of thyroid gland     hyperthyroid    Foot pain, left 2013    She fractured a bone in her left foot. Had severe pain associated with redness. Was given the diagnosis of reflex sympathetic dystrophy. Has had nerve blocks which were ineffective. Galactorrhea in female 11/10/2015    Genital warts     Graves' disease 7/3/2018    TSI positive. Intolerant of TPU. Switched to methimazole 5 mg daily by endocrinologist.  TSH in June of 2018 0.005 with normal T3 & T4. Followed by endocrinology at [facility] Dr. Roddy Steward. Hepatitis B 1998    Diagnosed with acute Hepatitis B. Subsequent Hep B Surface Ag negative, Surface Ab positive. HCV negative, HIV negative 2010. History of breast lump     Hypertrophic scar 07/12/2012    Insomnia     Iron deficiency anemia due to chronic blood loss 7/3/2018     Hemoglobin 11.4 in June of 2018 with a ferritin of 10. Presumed secondary to menstrual losses. Kidney stone     Has passed several kidney stones. No prior kidney stone workup. Try to stay well hydrated. No kidney stones for several years.     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    Wears glasses        Past Surgical History:   Procedure Laterality Date    APPENDECTOMY  2001    BREAST BIOPSY Left     benign- ultrasound biopsy- 2019    BREAST IMPLANT Bilateral 2008    CHOLECYSTECTOMY  2004    ENDOMETRIAL ABLATION N/A 05/18/2021    Procedure: ABLATION ENDOMETRIAL Promise Gutiérrez;  Surgeon: James Tan Leslie Moore MD;  [facility];  Service: Gynecology    FOOT SURGERY  2013    [place] HYSTEROSCOPY BX ENDOMETRIUM&/POLYPC W/WO D&C N/A 05/18/2021    Procedure: D&C W/ HYSTEROSCOPY;  Surgeon: Madina Auguste MD;  [facility];  Service: Gynecology    REVISION OF SCAR  02/18/2012    appendectomy scar revised    US GUIDED BREAST BIOPSY LEFT COMPLETE Left 04/2019       Current Outpatient Medications   Medication Sig Dispense Refill    [START ON 11/2/2023] estradiol-norethindrone (CombiPatch) 0.05-0.25 MG/DAY Place 1 patch on the skin 2 (two) times a week 8 patch 1    albuterol (PROVENTIL HFA,VENTOLIN HFA) 90 mcg/act inhaler INHALE 2 PUFFS INTO THE LUNGS EVERY 4 HOURS AS NEEDED FOR WHEEZING OR SHORTNESS OF BREATH.  (Patient not taking: Reported on 7/21/2023)      benzonatate (TESSALON PERLES) 100 mg capsule TAKE 1 CAPSULE BY MOUTH THREE TIMES A DAY AS NEEDED FOR COUGH (Patient not taking: Reported on 7/21/2023)      [facility] Saline Nasal [place] 0.65 % nasal spray SPRAY 1 SPRAY INTO EACH NOSTRIL EVERY 3 HOURS AS NEEDED FOR CONGESTION (Patient not taking: Reported on 7/21/2023)      DULoxetine (CYMBALTA) 60 mg delayed release capsule Take 1 capsule (60 mg total) by mouth daily (Patient not taking: Reported on 7/21/2023) 90 capsule 0    Ferrous Sulfate (IRON PO) Take 64 mg by mouth every other day      Flowflex COVID-19 Ag Home Test KIT TESTING      fluticasone (FLONASE) 50 mcg/act nasal spray SPRAY 1 SPRAY INTO INTO EACH NOSTRIL TWICE A DAY FOR 10 DAYS (Patient not taking: Reported on 7/21/2023)      HYDROcodone-acetaminophen (NORCO)  mg per tablet Take 1 tablet by mouth every 6 (six) hours as needed      HYDROcodone-acetaminophen (NORCO)  mg per tablet Take 1 tablet by mouth every 6 (six) hours as needed (Patient not taking: Reported on 7/21/2023)      LORATADINE PO Take by mouth (Patient not taking: Reported on 7/6/2023)      methimazole (TAPAZOLE) 5 mg tablet Take 1 tablet (5 mg total) by mouth in the morning 90 tablet 3    Multiple Vitamin (MULTI-VITAMIN DAILY PO) Take by mouth      multivitamin (THERAGRAN) TABS Take 1 tablet by mouth daily      neomycin-polymyxin-hydrocortisone (CORTISPORIN) 0.35%-10,000 units/mL-1% otic suspension Administer 4 drops into the left ear 4 (four) times a day for 7 days 10 mL 0    predniSONE 20 mg tablet TAKE 1 TABLET (20 MG TOTAL) BY MOUTH DAILY FOR 8 DAYS. (Patient not taking: Reported on 7/21/2023)      VITAMIN D PO Take by mouth      zolpidem (AMBIEN) 10 mg tablet TAKE 1 TABLET BY MOUTH DAILY AT BEDTIME AS NEEDED FOR SLEEP 30 tablet 0     No current facility-administered medications for this visit. Allergies   Allergen Reactions    Sulfa Antibiotics Other (See Comments)     Worsens UTI symptoms    Aspirin Rash and GI Intolerance    Penicillins Rash and GI Intolerance       Review of Systems   Constitutional: Negative. HENT: Negative. Eyes: Negative. Respiratory: Negative. Cardiovascular: Negative. Gastrointestinal: Negative. Endocrine: Negative. Genitourinary:         As noted in HPI   Musculoskeletal: Negative. Skin: Negative. Allergic/Immunologic: Negative. Neurological: Negative. Hematological: Negative. Psychiatric/Behavioral: Negative. Video Exam    There were no vitals filed for this visit. Physical Exam  Constitutional:       General: She is not in acute distress. Appearance: She is well-developed. HENT:      Head: Normocephalic and atraumatic. Pulmonary:      Effort: Pulmonary effort is normal. No respiratory distress. Skin:     Coloration: Skin is not pale. Findings: No rash. Neurological:      Mental Status: She is alert and oriented to person, place, and time. Psychiatric:         Behavior: Behavior normal.         Thought Content:  Thought content normal.          Visit Time  Total Visit Duration: 8 mins

## 2023-12-26 DIAGNOSIS — R23.2 HOT FLASHES: ICD-10-CM

## 2023-12-26 RX ORDER — ESTRADIOL/NORETHINDRONE ACETATE TRANSDERMAL SYSTEM .05; .25 MG/D; MG/D
PATCH, EXTENDED RELEASE TRANSDERMAL
Qty: 8 PATCH | Refills: 1 | Status: SHIPPED | OUTPATIENT
Start: 2023-12-26

## 2023-12-29 ENCOUNTER — TELEMEDICINE (OUTPATIENT)
Dept: OBGYN CLINIC | Facility: CLINIC | Age: 43
End: 2023-12-29
Payer: COMMERCIAL

## 2023-12-29 DIAGNOSIS — R23.2 HOT FLASHES: Primary | ICD-10-CM

## 2023-12-29 DIAGNOSIS — Z98.890 S/P ENDOMETRIAL ABLATION: ICD-10-CM

## 2023-12-29 PROCEDURE — 99212 OFFICE O/P EST SF 10 MIN: CPT | Performed by: OBSTETRICS & GYNECOLOGY

## 2023-12-29 NOTE — PROGRESS NOTES
Virtual Regular Visit    Verification of patient location:    Patient is located at Home in the following state in which I hold an active license [place]      Assessment/Plan:    Problem List Items Addressed This Visit    None  Visit Diagnoses       Hot flashes    -  Primary    Relevant Orders    Ambulatory Referral to Gynecology    S/P endometrial ablation              I advised patient continuation of CombiPatch for now.  Since she is having multiple other issues including issues with her thyroid, weight gain, and hair loss, referred her to menopause specialist for additional evaluation and treatment.  Follow-up for her annual GYN exam in July, sooner if needed.       Reason for visit is No chief complaint on file.       Encounter provider Valeria Victor MD    Provider located at OB/[facility] [address] [place] [address] [place] [id number] [phone]      Recent Visits  No visits were found meeting these conditions.  Showing recent visits within past 7 days and meeting all other requirements  Future Appointments  No visits were found meeting these conditions.  Showing future appointments within next 150 days and meeting all other requirements       The patient was identified by name and date of birth. Nanette Fierro was informed that this is a telemedicine visit and that the visit is being conducted through the Epic Embedded platform. She agrees to proceed..  My office door was closed. No one else was in the room.  She acknowledged consent and understanding of privacy and security of the video platform. The patient has agreed to participate and understands they can discontinue the visit at any time.    Patient is aware this is a billable service.     Subjective  Nanette Fierro is a 43 y.o. female      HPI   Patient reports improvement in her hot flashes but complaints of weight gain and hair loss    Past Medical History:   Diagnosis Date    Anemia      Disease of thyroid gland     hyperthyroid    Foot pain, left 2013    She fractured a bone in her left foot.  Had severe pain associated with redness.  Was given the diagnosis of reflex sympathetic dystrophy.  Has had nerve blocks which were ineffective.      Galactorrhea in female 11/10/2015    Genital warts     Graves' disease 7/3/2018    TSI positive.  Intolerant of TPU.  Switched to methimazole 5 mg daily by endocrinologist.  TSH in June of 2018 0.005 with normal T3 & T4. Followed by endocrinology at [facility] Dr. Tapia.    Hepatitis B 1998    Diagnosed with acute Hepatitis B.  Subsequent Hep B Surface Ag negative, Surface Ab positive. HCV negative, HIV negative 2010.     History of breast lump     Hypertrophic scar 07/12/2012    Insomnia     Iron deficiency anemia due to chronic blood loss 7/3/2018     Hemoglobin 11.4 in June of 2018 with a ferritin of 10.  Presumed secondary to menstrual losses.    Kidney stone     Has passed several kidney stones.  No prior kidney stone workup.  Try to stay well hydrated.  No kidney stones for several years.    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    Wears glasses        Past Surgical History:   Procedure Laterality Date    APPENDECTOMY  2001    BREAST BIOPSY Left     benign- ultrasound biopsy- 2019    BREAST IMPLANT Bilateral 2008    CHOLECYSTECTOMY  2004    ENDOMETRIAL ABLATION N/A 05/18/2021    Procedure: ABLATION ENDOMETRIAL NOVASURE;  Surgeon: Valeria Victor MD;  Location: [facility];  Service: Gynecology    FOOT SURGERY  2013    VT HYSTEROSCOPY BX ENDOMETRIUM&/POLYPC W/WO D&C N/A 05/18/2021    Procedure: D&C W/ HYSTEROSCOPY;  Surgeon: Valeria Victor MD;  Location: [facility];  Service: Gynecology    REVISION OF SCAR  02/18/2012    appendectomy scar revised    US GUIDED BREAST BIOPSY LEFT COMPLETE Left 04/2019       Current Outpatient Medications   Medication Sig Dispense Refill    albuterol (PROVENTIL HFA,VENTOLIN  HFA) 90 mcg/act inhaler INHALE 2 PUFFS INTO THE LUNGS EVERY 4 HOURS AS NEEDED FOR WHEEZING OR SHORTNESS OF BREATH. (Patient not taking: Reported on 7/21/2023)      benzonatate (TESSALON PERLES) 100 mg capsule TAKE 1 CAPSULE BY MOUTH THREE TIMES A DAY AS NEEDED FOR COUGH (Patient not taking: Reported on 7/21/2023)      CombiPatch 0.05-0.25 MG/DAY PLACE 1 PATCH ON THE SKIN 2 TIMES A WEEK. 8 patch 1    [facility] Saline Nasal [place] 0.65 % nasal spray SPRAY 1 SPRAY INTO EACH NOSTRIL EVERY 3 HOURS AS NEEDED FOR CONGESTION (Patient not taking: Reported on 7/21/2023)      DULoxetine (CYMBALTA) 60 mg delayed release capsule Take 1 capsule (60 mg total) by mouth daily (Patient not taking: Reported on 7/21/2023) 90 capsule 0    Ferrous Sulfate (IRON PO) Take 64 mg by mouth every other day      Flowflex COVID-19 Ag Home Test KIT TESTING      fluticasone (FLONASE) 50 mcg/act nasal spray SPRAY 1 SPRAY INTO INTO EACH NOSTRIL TWICE A DAY FOR 10 DAYS (Patient not taking: Reported on 7/21/2023)      HYDROcodone-acetaminophen (NORCO)  mg per tablet Take 1 tablet by mouth every 6 (six) hours as needed      HYDROcodone-acetaminophen (NORCO)  mg per tablet Take 1 tablet by mouth every 6 (six) hours as needed (Patient not taking: Reported on 7/21/2023)      LORATADINE PO Take by mouth (Patient not taking: Reported on 7/6/2023)      methimazole (TAPAZOLE) 5 mg tablet Take 1 tablet (5 mg total) by mouth in the morning 90 tablet 3    Multiple Vitamin (MULTI-VITAMIN DAILY PO) Take by mouth      multivitamin (THERAGRAN) TABS Take 1 tablet by mouth daily      neomycin-polymyxin-hydrocortisone (CORTISPORIN) 0.35%-10,000 units/mL-1% otic suspension Administer 4 drops into the left ear 4 (four) times a day for 7 days 10 mL 0    predniSONE 20 mg tablet TAKE 1 TABLET (20 MG TOTAL) BY MOUTH DAILY FOR 8 DAYS. (Patient not taking: Reported on 7/21/2023)      VITAMIN D PO Take by mouth      zolpidem (AMBIEN) 10 mg tablet TAKE 1 TABLET BY MOUTH  DAILY AT BEDTIME AS NEEDED FOR SLEEP 30 tablet 0     No current facility-administered medications for this visit.        Allergies   Allergen Reactions    Sulfa Antibiotics Other (See Comments)     Worsens UTI symptoms    Aspirin Rash and GI Intolerance    Penicillins Rash and GI Intolerance     Weight 183 lbs  Weight 174 lbs      Review of Systems   Constitutional: Negative.    HENT: Negative.     Eyes: Negative.    Respiratory: Negative.     Cardiovascular: Negative.    Gastrointestinal: Negative.    Endocrine: Negative.    Genitourinary:         As noted in HPI   Musculoskeletal: Negative.    Skin: Negative.    Allergic/Immunologic: Negative.    Neurological: Negative.    Hematological: Negative.    Psychiatric/Behavioral: Negative.         Video Exam    There were no vitals filed for this visit.    Physical Exam  Constitutional:       General: She is not in acute distress.     Appearance: She is well-developed.   HENT:      Head: Normocephalic and atraumatic.   Pulmonary:      Effort: Pulmonary effort is normal. No respiratory distress.   Skin:     Coloration: Skin is not pale.      Findings: No rash.   Neurological:      Mental Status: She is alert and oriented to person, place, and time.   Psychiatric:         Behavior: Behavior normal.         Thought Content: Thought content normal.          Visit Time  Total Visit Duration: 10 mins

## 2024-04-21 DIAGNOSIS — R23.2 HOT FLASHES: ICD-10-CM

## 2024-04-23 RX ORDER — ESTRADIOL/NORETHINDRONE ACETATE TRANSDERMAL SYSTEM .05; .25 MG/D; MG/D
1 PATCH, EXTENDED RELEASE TRANSDERMAL 2 TIMES WEEKLY
Qty: 8 PATCH | Refills: 3 | Status: SHIPPED | OUTPATIENT
Start: 2024-04-25 | End: 2024-05-01 | Stop reason: ALTCHOICE

## 2024-04-23 RX ORDER — ESTRADIOL/NORETHINDRONE ACETATE TRANSDERMAL SYSTEM .05; .25 MG/D; MG/D
PATCH, EXTENDED RELEASE TRANSDERMAL
Qty: 8 PATCH | Refills: 3 | Status: SHIPPED | OUTPATIENT
Start: 2024-04-23 | End: 2024-05-01 | Stop reason: ALTCHOICE

## 2024-05-03 ENCOUNTER — APPOINTMENT (OUTPATIENT)
Dept: LAB | Facility: CLINIC | Age: 44
End: 2024-05-03
Payer: COMMERCIAL

## 2024-05-03 DIAGNOSIS — F52.31 ANORGASMIA OF FEMALE: ICD-10-CM

## 2024-05-03 DIAGNOSIS — R63.5 WEIGHT GAIN: ICD-10-CM

## 2024-05-03 DIAGNOSIS — N95.1 PERIMENOPAUSAL SYMPTOMS: ICD-10-CM

## 2024-05-03 LAB
CORTIS AM PEAK SERPL-MCNC: 14.7 UG/DL (ref 6.7–22.6)
TESTOST SERPL-MSCNC: 21 NG/DL

## 2024-05-03 PROCEDURE — 36415 COLL VENOUS BLD VENIPUNCTURE: CPT

## 2024-05-03 PROCEDURE — 82627 DEHYDROEPIANDROSTERONE: CPT

## 2024-05-03 PROCEDURE — 82533 TOTAL CORTISOL: CPT

## 2024-05-03 PROCEDURE — 84403 ASSAY OF TOTAL TESTOSTERONE: CPT

## 2024-05-04 PROBLEM — R25.2 MUSCLE CRAMP: Status: RESOLVED | Noted: 2018-07-03 | Resolved: 2024-05-04

## 2024-05-04 PROBLEM — N63.20 LEFT BREAST MASS: Status: RESOLVED | Noted: 2019-01-31 | Resolved: 2024-05-04

## 2024-05-04 LAB — DHEA-S SERPL-MCNC: 40.9 UG/DL (ref 57.3–279.2)

## 2024-05-13 DIAGNOSIS — F52.31 ANORGASMIA OF FEMALE: ICD-10-CM

## 2024-05-13 DIAGNOSIS — N95.1 PERIMENOPAUSAL SYMPTOMS: Primary | ICD-10-CM

## 2024-05-16 ENCOUNTER — TELEPHONE (OUTPATIENT)
Dept: OBGYN CLINIC | Facility: CLINIC | Age: 44
End: 2024-05-16

## 2024-07-01 DIAGNOSIS — N95.1 MENOPAUSAL SYMPTOMS: Primary | ICD-10-CM

## 2024-07-01 RX ORDER — PROGESTERONE 200 MG/1
200 CAPSULE ORAL
Qty: 30 CAPSULE | Refills: 11 | Status: SHIPPED | OUTPATIENT
Start: 2024-07-01

## 2024-07-01 RX ORDER — ESTRADIOL 1 MG/1
1 TABLET ORAL DAILY
Qty: 30 TABLET | Refills: 11 | Status: SHIPPED | OUTPATIENT
Start: 2024-07-01

## 2024-07-23 DIAGNOSIS — N95.1 MENOPAUSAL SYMPTOMS: ICD-10-CM

## 2024-07-23 RX ORDER — ESTRADIOL 1 MG/1
1 TABLET ORAL DAILY
Qty: 90 TABLET | Refills: 3 | Status: SHIPPED | OUTPATIENT
Start: 2024-07-23

## 2024-07-24 RX ORDER — PROGESTERONE 200 MG/1
200 CAPSULE ORAL
Qty: 90 CAPSULE | Refills: 4 | Status: SHIPPED | OUTPATIENT
Start: 2024-07-24

## 2024-08-21 ENCOUNTER — TELEPHONE (OUTPATIENT)
Age: 44
End: 2024-08-21

## 2024-08-21 NOTE — TELEPHONE ENCOUNTER
Pt called to schedule a F/U appt with Dr. Campbell. Per [facility] communication with pt, Dr. Brisa Anna is recommending pt make an appt for mid Oct. No available appt's on my end. Attempted warm transfer to office, however, all associates currently busy. Please provide a call back to assist with scheduling F/U appt. Thank you.

## 2024-08-24 DIAGNOSIS — N95.1 MENOPAUSAL SYMPTOMS: ICD-10-CM

## 2024-08-27 RX ORDER — ESTRADIOL 1 MG/1
1 TABLET ORAL 2 TIMES DAILY
Qty: 180 TABLET | Refills: 0 | Status: SHIPPED | OUTPATIENT
Start: 2024-08-27

## 2024-08-28 ENCOUNTER — TELEPHONE (OUTPATIENT)
Age: 44
End: 2024-08-28

## 2024-08-28 NOTE — TELEPHONE ENCOUNTER
Patient requested medication refill through [facility] pharmacy for veozah 45mg tablet. [facility] into [facility].

## 2024-08-30 ENCOUNTER — HOSPITAL ENCOUNTER (OUTPATIENT)
Dept: RADIOLOGY | Facility: CLINIC | Age: 44
End: 2024-08-30
Payer: COMMERCIAL

## 2024-08-30 VITALS — HEIGHT: 71 IN | WEIGHT: 175 LBS | BODY MASS INDEX: 24.5 KG/M2

## 2024-08-30 DIAGNOSIS — Z12.31 ENCOUNTER FOR SCREENING MAMMOGRAM FOR MALIGNANT NEOPLASM OF BREAST: ICD-10-CM

## 2024-08-30 DIAGNOSIS — N95.1 VASOMOTOR SYMPTOMS DUE TO MENOPAUSE: Primary | ICD-10-CM

## 2024-08-30 PROCEDURE — 77063 BREAST TOMOSYNTHESIS BI: CPT

## 2024-08-30 PROCEDURE — 77067 SCR MAMMO BI INCL CAD: CPT

## 2024-08-31 ENCOUNTER — HOSPITAL ENCOUNTER (EMERGENCY)
Facility: HOSPITAL | Age: 44
Discharge: HOME | End: 2024-08-31
Attending: EMERGENCY MEDICINE
Payer: COMMERCIAL

## 2024-08-31 ENCOUNTER — APPOINTMENT (EMERGENCY)
Dept: RADIOLOGY | Facility: HOSPITAL | Age: 44
End: 2024-08-31
Payer: COMMERCIAL

## 2024-08-31 VITALS
SYSTOLIC BLOOD PRESSURE: 146 MMHG | BODY MASS INDEX: 24.5 KG/M2 | OXYGEN SATURATION: 100 % | DIASTOLIC BLOOD PRESSURE: 84 MMHG | HEIGHT: 71 IN | TEMPERATURE: 97.4 F | HEART RATE: 94 BPM | WEIGHT: 175 LBS | RESPIRATION RATE: 24 BRPM

## 2024-08-31 DIAGNOSIS — B34.9 VIRAL SYNDROME: Primary | ICD-10-CM

## 2024-08-31 DIAGNOSIS — J45.21 MILD INTERMITTENT ASTHMA WITH ACUTE EXACERBATION: ICD-10-CM

## 2024-08-31 LAB
ALBUMIN SERPL BCG-MCNC: 4.8 G/DL (ref 3.5–5)
ALP SERPL-CCNC: 54 U/L (ref 34–104)
ALT SERPL W P-5'-P-CCNC: 13 U/L (ref 7–52)
ANION GAP SERPL CALCULATED.3IONS-SCNC: 10 MMOL/L (ref 4–13)
APTT PPP: 22 SECONDS (ref 23–34)
AST SERPL W P-5'-P-CCNC: 16 U/L (ref 13–39)
BASOPHILS # BLD AUTO: 0.03 THOUSANDS/ÂΜL (ref 0–0.1)
BASOPHILS NFR BLD AUTO: 0 % (ref 0–1)
BILIRUB SERPL-MCNC: 0.56 MG/DL (ref 0.2–1)
BILIRUB UR QL STRIP: NEGATIVE
BNP SERPL-MCNC: 18 PG/ML (ref 0–100)
BUN SERPL-MCNC: 16 MG/DL (ref 5–25)
CALCIUM SERPL-MCNC: 10.3 MG/DL (ref 8.4–10.2)
CARDIAC TROPONIN I PNL SERPL HS: <2 NG/L
CHLORIDE SERPL-SCNC: 108 MMOL/L (ref 96–108)
CLARITY UR: CLEAR
CO2 SERPL-SCNC: 20 MMOL/L (ref 21–32)
COLOR UR: YELLOW
CREAT SERPL-MCNC: 0.72 MG/DL (ref 0.6–1.3)
D DIMER PPP FEU-MCNC: <0.27 UG/ML FEU
EOSINOPHIL # BLD AUTO: 0.03 THOUSAND/ÂΜL (ref 0–0.61)
EOSINOPHIL NFR BLD AUTO: 0 % (ref 0–6)
ERYTHROCYTE [DISTWIDTH] IN BLOOD BY AUTOMATED COUNT: 12.3 % (ref 11.6–15.1)
FLUAV RNA RESP QL NAA+PROBE: NEGATIVE
FLUBV RNA RESP QL NAA+PROBE: NEGATIVE
GFR SERPL CREATININE-BSD FRML MDRD: 101 ML/MIN/1.73SQ M
GLUCOSE SERPL-MCNC: 95 MG/DL (ref 65–140)
GLUCOSE UR STRIP-MCNC: NEGATIVE MG/DL
HCT VFR BLD AUTO: 41.7 % (ref 34.8–46.1)
HGB BLD-MCNC: 13.7 G/DL (ref 11.5–15.4)
HGB UR QL STRIP.AUTO: NEGATIVE
IMM GRANULOCYTES # BLD AUTO: 0.02 THOUSAND/UL (ref 0–0.2)
IMM GRANULOCYTES NFR BLD AUTO: 0 % (ref 0–2)
INR PPP: 0.91 (ref 0.85–1.19)
KETONES UR STRIP-MCNC: NEGATIVE MG/DL
LEUKOCYTE ESTERASE UR QL STRIP: NEGATIVE
LYMPHOCYTES # BLD AUTO: 0.99 THOUSANDS/ÂΜL (ref 0.6–4.47)
LYMPHOCYTES NFR BLD AUTO: 15 % (ref 14–44)
MAGNESIUM SERPL-MCNC: 1.8 MG/DL (ref 1.9–2.7)
MCH RBC QN AUTO: 27.1 PG (ref 26.8–34.3)
MCHC RBC AUTO-ENTMCNC: 32.9 G/DL (ref 31.4–37.4)
MCV RBC AUTO: 83 FL (ref 82–98)
MONOCYTES # BLD AUTO: 0.42 THOUSAND/ÂΜL (ref 0.17–1.22)
MONOCYTES NFR BLD AUTO: 6 % (ref 4–12)
NEUTROPHILS # BLD AUTO: 5.21 THOUSANDS/ÂΜL (ref 1.85–7.62)
NEUTS SEG NFR BLD AUTO: 79 % (ref 43–75)
NITRITE UR QL STRIP: NEGATIVE
NRBC BLD AUTO-RTO: 0 /100 WBCS
PH UR STRIP.AUTO: 7 [PH]
PLATELET # BLD AUTO: 250 THOUSANDS/UL (ref 149–390)
PMV BLD AUTO: 9.9 FL (ref 8.9–12.7)
POTASSIUM SERPL-SCNC: 3.3 MMOL/L (ref 3.5–5.3)
PROT SERPL-MCNC: 8 G/DL (ref 6.4–8.4)
PROT UR STRIP-MCNC: NEGATIVE MG/DL
PROTHROMBIN TIME: 12.7 SECONDS (ref 12.3–15)
RBC # BLD AUTO: 5.05 MILLION/UL (ref 3.81–5.12)
RSV RNA RESP QL NAA+PROBE: NEGATIVE
SARS-COV-2 RNA RESP QL NAA+PROBE: NEGATIVE
SODIUM SERPL-SCNC: 138 MMOL/L (ref 135–147)
SP GR UR STRIP.AUTO: <=1.005 (ref 1–1.03)
UROBILINOGEN UR QL STRIP.AUTO: 0.2 E.U./DL
WBC # BLD AUTO: 6.7 THOUSAND/UL (ref 4.31–10.16)

## 2024-08-31 PROCEDURE — 94640 AIRWAY INHALATION TREATMENT: CPT

## 2024-08-31 PROCEDURE — 99284 EMERGENCY DEPT VISIT MOD MDM: CPT | Performed by: EMERGENCY MEDICINE

## 2024-08-31 PROCEDURE — 85730 THROMBOPLASTIN TIME PARTIAL: CPT | Performed by: EMERGENCY MEDICINE

## 2024-08-31 PROCEDURE — 93005 ELECTROCARDIOGRAM TRACING: CPT

## 2024-08-31 PROCEDURE — 36415 COLL VENOUS BLD VENIPUNCTURE: CPT

## 2024-08-31 PROCEDURE — 81003 URINALYSIS AUTO W/O SCOPE: CPT | Performed by: EMERGENCY MEDICINE

## 2024-08-31 PROCEDURE — 83880 ASSAY OF NATRIURETIC PEPTIDE: CPT | Performed by: EMERGENCY MEDICINE

## 2024-08-31 PROCEDURE — 85379 FIBRIN DEGRADATION QUANT: CPT | Performed by: EMERGENCY MEDICINE

## 2024-08-31 PROCEDURE — 0241U HB NFCT DS VIR RESP RNA 4 TRGT: CPT | Performed by: EMERGENCY MEDICINE

## 2024-08-31 PROCEDURE — 80053 COMPREHEN METABOLIC PANEL: CPT | Performed by: EMERGENCY MEDICINE

## 2024-08-31 PROCEDURE — 85610 PROTHROMBIN TIME: CPT | Performed by: EMERGENCY MEDICINE

## 2024-08-31 PROCEDURE — 71045 X-RAY EXAM CHEST 1 VIEW: CPT

## 2024-08-31 PROCEDURE — 96375 TX/PRO/DX INJ NEW DRUG ADDON: CPT

## 2024-08-31 PROCEDURE — 84484 ASSAY OF TROPONIN QUANT: CPT | Performed by: EMERGENCY MEDICINE

## 2024-08-31 PROCEDURE — 99284 EMERGENCY DEPT VISIT MOD MDM: CPT

## 2024-08-31 PROCEDURE — 85025 COMPLETE CBC W/AUTO DIFF WBC: CPT | Performed by: EMERGENCY MEDICINE

## 2024-08-31 PROCEDURE — 96365 THER/PROPH/DIAG IV INF INIT: CPT

## 2024-08-31 PROCEDURE — 83735 ASSAY OF MAGNESIUM: CPT | Performed by: EMERGENCY MEDICINE

## 2024-08-31 RX ORDER — DEXAMETHASONE SODIUM PHOSPHATE 10 MG/ML
6 INJECTION, SOLUTION INTRAMUSCULAR; INTRAVENOUS ONCE
Status: COMPLETED | OUTPATIENT
Start: 2024-08-31 | End: 2024-08-31

## 2024-08-31 RX ORDER — MAGNESIUM SULFATE HEPTAHYDRATE 40 MG/ML
2 INJECTION, SOLUTION INTRAVENOUS ONCE
Status: COMPLETED | OUTPATIENT
Start: 2024-08-31 | End: 2024-08-31

## 2024-08-31 RX ORDER — POTASSIUM CHLORIDE 1500 MG/1
40 TABLET, EXTENDED RELEASE ORAL ONCE
Status: COMPLETED | OUTPATIENT
Start: 2024-08-31 | End: 2024-08-31

## 2024-08-31 RX ORDER — IPRATROPIUM BROMIDE AND ALBUTEROL SULFATE 2.5; .5 MG/3ML; MG/3ML
3 SOLUTION RESPIRATORY (INHALATION) ONCE
Status: COMPLETED | OUTPATIENT
Start: 2024-08-31 | End: 2024-08-31

## 2024-08-31 RX ORDER — PREDNISONE 20 MG/1
40 TABLET ORAL DAILY
Qty: 10 TABLET | Refills: 0 | Status: SHIPPED | OUTPATIENT
Start: 2024-08-31 | End: 2024-09-06

## 2024-08-31 RX ADMIN — IPRATROPIUM BROMIDE AND ALBUTEROL SULFATE 3 ML: 2.5; .5 SOLUTION RESPIRATORY (INHALATION) at 15:58

## 2024-08-31 RX ADMIN — POTASSIUM CHLORIDE 40 MEQ: 1500 TABLET, EXTENDED RELEASE ORAL at 17:23

## 2024-08-31 RX ADMIN — MAGNESIUM SULFATE HEPTAHYDRATE 2 G: 40 INJECTION, SOLUTION INTRAVENOUS at 16:27

## 2024-08-31 RX ADMIN — DEXAMETHASONE SODIUM PHOSPHATE 6 MG: 10 INJECTION, SOLUTION INTRAMUSCULAR; INTRAVENOUS at 15:58

## 2024-08-31 RX ADMIN — SODIUM CHLORIDE 1000 ML: 0.9 INJECTION, SOLUTION INTRAVENOUS at 15:57

## 2024-08-31 NOTE — ED PROVIDER NOTES
"History  Chief Complaint   Patient presents with    Flu Symptoms     Patient reports flu symptoms since yesterday, states she can't stop sneezing and coughing. Took OTC claratin D. States yesterday she \"lost her vision\" and went to bed so her  didn't worry. Then woke up with both hands tingling. Patient reports pain with breathing.      Patient is a 44-year-old female presenting to the emergency department complaining of flulike symptoms since yesterday, reporting sneezing, cough and congestion, fullness sensation in her right ear, heaviness in her chest and difficulty breathing, states last night before bed she lost vision in her right eye but she went to sleep and woke up this morning with her vision back to normal, she denies a headache, no fevers, cough is nonproductive, no specific sick contacts, she does report a history of asthma and has been using an inhaler with minimal relief of symptoms        Prior to Admission Medications   Prescriptions Last Dose Informant Patient Reported? Taking?   DULoxetine (CYMBALTA) 60 mg delayed release capsule   No No   Sig: Take 1 capsule (60 mg total) by mouth daily   Patient not taking: Reported on 7/21/2023   Ferrous Sulfate (IRON PO)   Yes No   Sig: Take 64 mg by mouth every other day   Flowflex COVID-19 Ag Home Test KIT   Yes No   Sig: TESTING   HYDROcodone-acetaminophen (NORCO)  mg per tablet   Yes No   Sig: Take 1 tablet by mouth every 6 (six) hours as needed   LORATADINE PO   Yes No   Sig: Take by mouth   Patient not taking: Reported on 7/6/2023   Multiple Vitamin (MULTI-VITAMIN DAILY PO)   Yes No   Sig: Take by mouth   Progesterone 200 MG CAPS   No No   Sig: TAKE 200 MG BY MOUTH AT BEDTIME   VITAMIN D PO   Yes No   Sig: Take by mouth   estradiol (ESTRACE) 1 mg tablet   No No   Sig: Take 1 tablet (1 mg total) by mouth 2 (two) times a day   fezolinetant (Veozah) tablet   No No   Sig: Take 1 tablet (45 mg total) by mouth daily   methimazole (TAPAZOLE) 5 mg " tablet   No No   Sig: Take 1 tablet (5 mg total) by mouth in the morning   multivitamin (THERAGRAN) TABS   Yes No   Sig: Take 1 tablet by mouth daily   neomycin-polymyxin-hydrocortisone (CORTISPORIN) 0.35%-10,000 units/mL-1% otic suspension   No No   Sig: Administer 4 drops into the left ear 4 (four) times a day for 7 days   other medication, see sig,   No No   Sig: Medication/product name: Scream Cream  Strength: Arginine 6%/ergoloid mesylate 0.05%/ papaverine 5%/sildenafil 1%/ml  Sig (include dose, route, frequency): apply 1 ml to clitoris daily as needed   oxyCODONE (ROXICODONE) 5 immediate release tablet   Yes No   Sig: Take 5 mg by mouth every 8 (eight) hours as needed   zolpidem (AMBIEN) 10 mg tablet   No No   Sig: TAKE 1 TABLET BY MOUTH DAILY AT BEDTIME AS NEEDED FOR SLEEP      Facility-Administered Medications: None       Past Medical History:   Diagnosis Date    Anemia     Disease of thyroid gland     hyperthyroid    Foot pain, left 2013    She fractured a bone in her left foot.  Had severe pain associated with redness.  Was given the diagnosis of reflex sympathetic dystrophy.  Has had nerve blocks which were ineffective.      Galactorrhea in female 11/10/2015    Genital warts     Graves' disease 7/3/2018    TSI positive.  Intolerant of TPU.  Switched to methimazole 5 mg daily by endocrinologist.  TSH in June of 2018 0.005 with normal T3 & T4. Followed by endocrinology at [facility] Dr. Tapia.    Hepatitis B 1998    Diagnosed with acute Hepatitis B.  Subsequent Hep B Surface Ag negative, Surface Ab positive. HCV negative, HIV negative 2010.     History of breast lump     Hypertrophic scar 07/12/2012    Insomnia     Iron deficiency anemia due to chronic blood loss 7/3/2018     Hemoglobin 11.4 in June of 2018 with a ferritin of 10.  Presumed secondary to menstrual losses.    Kidney stone     Has passed several kidney stones.  No prior kidney stone workup.  Try to stay well hydrated.  No kidney stones for  several years.    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    Wears glasses        Past Surgical History:   Procedure Laterality Date    APPENDECTOMY  2001    BREAST BIOPSY Left     benign- ultrasound biopsy- 2019    BREAST IMPLANT Bilateral 2008    CHOLECYSTECTOMY  2004    ENDOMETRIAL ABLATION N/A 05/18/2021    Procedure: ABLATION ENDOMETRIAL NOVASURE;  Surgeon: Valeria Victor MD;  Location: [facility];  Service: Gynecology    FOOT SURGERY  2013    MD HYSTEROSCOPY BX ENDOMETRIUM&/POLYPC W/WO D&C N/A 05/18/2021    Procedure: D&C W/ HYSTEROSCOPY;  Surgeon: Valeria Victor MD;  Location: [facility];  Service: Gynecology    REVISION OF SCAR  02/18/2012    appendectomy scar revised    US GUIDED BREAST BIOPSY LEFT COMPLETE Left 04/2019       Family History   Problem Relation Age of Onset    Breast cancer Mother 59    Thyroid disease Mother     Iron deficiency Mother     Heart disease Mother     Leukemia Mother 65    Carpal tunnel syndrome Father     Thyroid disease Sister     No Known Problems Daughter     No Known Problems Daughter     No Known Problems Daughter     Thyroid disease Maternal Grandmother     Diabetes Maternal Grandmother     Heart disease Maternal Grandfather     Prostate cancer Maternal Grandfather     Pancreatic cancer Maternal Grandfather     Cervical cancer Paternal Grandmother     Heart disease Paternal Grandmother     Endometrial cancer Paternal Grandmother     Heart disease Paternal Grandfather     Hypertension Paternal Grandfather     Lung cancer Paternal Grandfather     Liver cancer Paternal Grandfather     Heart disease Maternal Aunt     Thyroid disease Maternal Aunt     Thyroid disease Maternal Aunt     No Known Problems Paternal Aunt     No Known Problems Paternal Aunt      I have reviewed and agree with the history as documented.    E-Cigarette/Vaping    E-Cigarette Use Never User      E-Cigarette/Vaping Substances    Nicotine No     THC No      CBD No     Flavoring No     Other No     Unknown No      Social History     Tobacco Use    Smoking status: Never    Smokeless tobacco: Never   Vaping Use    Vaping status: Never Used   Substance Use Topics    Alcohol use: Not Currently     Comment: rare, few times a year    Drug use: Not Currently     Comment: last used heroin/cocaine age 16- IV usage,describes self as recovering addict       Review of Systems   Constitutional:  Positive for fatigue and fever.   HENT:  Positive for congestion and ear pain.    Eyes:  Positive for visual disturbance.   Respiratory:  Positive for cough and shortness of breath.    Cardiovascular: Negative.    Gastrointestinal: Negative.    Endocrine: Negative.    Genitourinary: Negative.    Musculoskeletal:  Positive for myalgias.   Skin: Negative.    Allergic/Immunologic: Negative.    Neurological: Negative.    Hematological: Negative.    Psychiatric/Behavioral: Negative.         Physical Exam  Physical Exam  Constitutional:       Appearance: Normal appearance. She is well-developed.   HENT:      Head: Normocephalic and atraumatic.   Eyes:      Conjunctiva/sclera: Conjunctivae normal.      Pupils: Pupils are equal, round, and reactive to light.   Cardiovascular:      Rate and Rhythm: Tachycardia present.      Heart sounds: Normal heart sounds.   Pulmonary:      Effort: Pulmonary effort is normal.      Breath sounds: Normal breath sounds.   Abdominal:      Palpations: Abdomen is soft.   Musculoskeletal:         General: Normal range of motion.      Cervical back: Normal range of motion and neck supple.   Skin:     General: Skin is warm and dry.   Neurological:      Mental Status: She is alert and oriented to person, place, and time.   Psychiatric:         Mood and Affect: Mood is anxious.         Vital Signs  ED Triage Vitals [08/31/24 1452]   Temperature Pulse Respirations Blood Pressure SpO2   97.9 °F (36.6 °C) 88 (!) 24 157/96 100 %      Temp Source Heart Rate Source Patient Position  - Orthostatic VS BP Location FiO2 (%)   Temporal Monitor Lying Right arm --      Pain Score       No Pain           Vitals:    08/31/24 1452 08/31/24 1600   BP: 157/96 146/84   Pulse: 88 94   Patient Position - Orthostatic VS: Lying          Visual Acuity      ED Medications  Medications   sodium chloride 0.9 % bolus 1,000 mL (0 mL Intravenous Stopped 8/31/24 1727)   dexamethasone (PF) (DECADRON) injection 6 mg (6 mg Intravenous Given 8/31/24 1558)   ipratropium-albuterol (DUO-NEB) 0.5-2.5 mg/3 mL inhalation solution 3 mL (3 mL Nebulization Given 8/31/24 1558)   magnesium sulfate 2 g/50 mL IVPB (premix) 2 g (0 g Intravenous Stopped 8/31/24 1727)   potassium chloride (Klor-Con M20) CR tablet 40 mEq (40 mEq Oral Given 8/31/24 1723)       Diagnostic Studies  Results Reviewed       Procedure Component Value Units Date/Time    FLU/RSV/COVID - if FLU/RSV clinically relevant [[id number]]  (Normal) Collected: 08/31/24 1528    Lab Status: Final result Specimen: Nares from Nose Updated: 08/31/24 1625     SARS-CoV-2 Negative     INFLUENZA A PCR Negative     INFLUENZA B PCR Negative     RSV PCR Negative    Narrative:      This test has been performed using the CoV-2/Flu/RSV plus assay on the [employer] GeneXpert platform. This test has been validated by the  and verified by the performing laboratory.     This test is designed to amplify and detect the following: nucleocapsid (N), envelope (E), and RNA-dependent RNA polymerase (RdRP) genes of the SARS-CoV-2 genome; matrix (M), basic polymerase (PB2), and acidic protein (PA) segments of the influenza A genome; matrix (M) and non-structural protein (NS) segments of the influenza B genome, and the nucleocapsid genes of RSV A and RSV B.     Positive results are indicative of the presence of Flu A, Flu B, RSV, and/or SARS-CoV-2 RNA. Positive results for SARS-CoV-2 or suspected novel influenza should be reported to state, local, or federal health departments according to local  reporting requirements.      All results should be assessed in conjunction with clinical presentation and other laboratory markers for clinical management.     FOR PEDIATRIC PATIENTS - copy/paste COVID Guidelines URL to browser: [email or web address]       B-Type Natriuretic Peptide(BNP) [[id number]]  (Normal) Collected: 08/31/24 1528    Lab Status: Final result Specimen: Blood from Arm, Right Updated: 08/31/24 1610     BNP 18 pg/mL     UA w Reflex to Microscopic w Reflex to Culture [[id number]] Collected: 08/31/24 1558    Lab Status: Final result Specimen: Urine, Clean Catch Updated: 08/31/24 1608     Color, UA Yellow     Clarity, UA Clear     Specific Gravity, UA <=1.005     pH, UA 7.0     Leukocytes, UA Negative     Nitrite, UA Negative     Protein, UA Negative mg/dl      Glucose, UA Negative mg/dl      Ketones, UA Negative mg/dl      Urobilinogen, UA 0.2 E.U./dl      Bilirubin, UA Negative     Occult Blood, UA Negative    Magnesium [[id number]]  (Abnormal) Collected: 08/31/24 1528    Lab Status: Final result Specimen: Blood from Arm, Right Updated: 08/31/24 1607     Magnesium 1.8 mg/dL     HS Troponin 0hr (reflex protocol) [[id number]]  (Normal) Collected: 08/31/24 1528    Lab Status: Final result Specimen: Blood from Arm, Right Updated: 08/31/24 1604     hs TnI 0hr <2 ng/L     D-Dimer [[id number]]  (Normal) Collected: 08/31/24 1528    Lab Status: Final result Specimen: Blood from Arm, Right Updated: 08/31/24 1557     D-Dimer, Quant <0.27 ug/ml FEU     Comprehensive metabolic panel [[id number]]  (Abnormal) Collected: 08/31/24 1528    Lab Status: Final result Specimen: Blood from Arm, Right Updated: 08/31/24 1557     Sodium 138 mmol/L      Potassium 3.3 mmol/L      Chloride 108 mmol/L      CO2 20 mmol/L      ANION GAP 10 mmol/L      BUN 16 mg/dL      Creatinine 0.72 mg/dL      Glucose 95 mg/dL      Calcium 10.3 mg/dL      AST 16 U/L      ALT 13 U/L      Alkaline  Phosphatase 54 U/L      Total Protein 8.0 g/dL      Albumin 4.8 g/dL      Total Bilirubin 0.56 mg/dL      eGFR 101 ml/min/1.73sq m     Narrative:      National Kidney Disease Foundation guidelines for Chronic Kidney Disease (CKD):     Stage 1 with normal or high GFR (GFR > 90 mL/min/1.73 square meters)    Stage 2 Mild CKD (GFR = 60-89 mL/min/1.73 square meters)    Stage 3A Moderate CKD (GFR = 45-59 mL/min/1.73 square meters)    Stage 3B Moderate CKD (GFR = 30-44 mL/min/1.73 square meters)    Stage 4 Severe CKD (GFR = 15-29 mL/min/1.73 square meters)    Stage 5 End Stage CKD (GFR <15 mL/min/1.73 square meters)  Note: GFR calculation is accurate only with a steady state creatinine    Protime-INR [[id number]]  (Normal) Collected: 08/31/24 1528    Lab Status: Final result Specimen: Blood from Arm, Right Updated: 08/31/24 1554     Protime 12.7 seconds      INR 0.91    Narrative:      INR Therapeutic Range    Indication                                             INR Range      Atrial Fibrillation                                               2.0-3.0  Hypercoagulable State                                    2.0.2.3  Left Ventricular Asist Device                            2.0-3.0  Mechanical Heart Valve                                  -    Aortic(with afib, MI, embolism, HF, LA enlargement,    and/or coagulopathy)                                     2.0-3.0 (2.5-3.5)     Mitral                                                             2.5-3.5  Prosthetic/Bioprosthetic Heart Valve               2.0-3.0  Venous thromboembolism (VTE: VT, PE        2.0-3.0    APTT [[id number]]  (Abnormal) Collected: 08/31/24 1528    Lab Status: Final result Specimen: Blood from Arm, Right Updated: 08/31/24 1554     PTT 22 seconds     Narrative:      No clot    CBC and differential [[id number]]  (Abnormal) Collected: 08/31/24 1528    Lab Status: Final result Specimen: Blood from Arm, Right Updated: 08/31/24 1544     WBC 6.70 Thousand/uL       RBC 5.05 Million/uL      Hemoglobin 13.7 g/dL      Hematocrit 41.7 %      MCV 83 fL      MCH 27.1 pg      MCHC 32.9 g/dL      RDW 12.3 %      MPV 9.9 fL      Platelets 250 Thousands/uL      nRBC 0 /100 WBCs      Segmented % 79 %      Immature Grans % 0 %      Lymphocytes % 15 %      Monocytes % 6 %      Eosinophils Relative 0 %      Basophils Relative 0 %      Absolute Neutrophils 5.21 Thousands/µL      Absolute Immature Grans 0.02 Thousand/uL      Absolute Lymphocytes 0.99 Thousands/µL      Absolute Monocytes 0.42 Thousand/µL      Eosinophils Absolute 0.03 Thousand/µL      Basophils Absolute 0.03 Thousands/µL                    XR chest 1 view portable   ED Interpretation by Cynthia Menendez DO (08/31 2029)   No acute findings                 Procedures  ECG 12 Lead Documentation Only    Date/Time: 8/31/2024 8:29 PM    Performed by: Cynthia Menendez DO  Authorized by: Cynthia Menendez DO    Indications / Diagnosis:  Shortness of breath  ECG reviewed by me, the ED Provider: yes    Patient location:  ED  Previous ECG:     Comparison to cardiac monitor: Yes    Interpretation:     Interpretation: normal    Rate:     ECG rate:  91    ECG rate assessment: normal    Rhythm:     Rhythm: sinus rhythm    Ectopy:     Ectopy: none    QRS:     QRS intervals:  Normal  Conduction:     Conduction: normal    ST segments:     ST segments:  Normal  T waves:     T waves: normal             ED Course  ED Course as of 08/31/24 2030   Sat Aug 31, 2024   2028 UA w Reflex to Microscopic w Reflex to Culture   2028 FLU/RSV/COVID - if FLU/RSV clinically relevant   2028 B-Type Natriuretic Peptide(BNP)   2028 Magnesium(!)   2028 D-Dimer   2028 Comprehensive metabolic panel(!)   2028 APTT(!)   2028 CBC and differential(!)   2028 Protime-INR   2029 Lab and imaging findings discussed with patient at bedside, largely unremarkable with mild hypokalemia and hypomagnesemia, replaced orally and intravenously, patient reports feeling much better on  reevaluation, symptoms consistent with viral URI with likely asthma exacerbation                                 SBIRT 22yo+      Flowsheet Row Most Recent Value   Initial Alcohol Screen: US AUDIT-C     1. How often do you have a drink containing alcohol? 0 Filed at: 08/31/2024 1605   2. How many drinks containing alcohol do you have on a typical day you are drinking?  0 Filed at: 08/31/2024 1605   3a. Male UNDER 65: How often do you have five or more drinks on one occasion? 0 Filed at: 08/31/2024 1605   3b. FEMALE Any Age, or MALE 65+: How often do you have 4 or more drinks on one occassion? 0 Filed at: 08/31/2024 1605   Audit-C Score 0 Filed at: 08/31/2024 1605   WENDY: How many times in the past year have you...    Used an illegal drug or used a prescription medication for non-medical reasons? Never Filed at: 08/31/2024 1605                      Medical Decision Making  This patient presents with symptoms suspicious for likely viral upper respiratory infection.  Based on history and physical doubt sinusitis.  Nasal swab was sent for COVID, RSV and influenza testing which is negative.  Do not suspect any underlying cardiopulmonary process.  Chest x-ray shows no evidence of pneumonia.  Patient is nontoxic, in no acute distress and not in need of any emergent medical intervention.  Patient and/or parents told to continue good supportive care at home, follow-up with PCP as needed or return if symptoms worsen      Problems Addressed:  Mild intermittent asthma with acute exacerbation: acute illness or injury  Viral syndrome: acute illness or injury    Amount and/or Complexity of Data Reviewed  Labs: ordered. Decision-making details documented in ED Course.  Radiology: ordered and independent interpretation performed. Decision-making details documented in ED Course.  ECG/medicine tests: ordered and independent interpretation performed. Decision-making details documented in ED Course.    Risk  OTC drugs.  Prescription  drug management.                 Disposition  Final diagnoses:   Viral syndrome   Mild intermittent asthma with acute exacerbation     Time reflects when diagnosis was documented in both MDM as applicable and the Disposition within this note       Time User Action Codes Description Comment    8/31/2024  5:28 PM Cynthia Menendez [B34.9] Viral syndrome     8/31/2024  5:29 PM Cynthia Menendez [J45.21] Mild intermittent asthma with acute exacerbation           ED Disposition       ED Disposition   Discharge    Condition   Stable    Date/Time   Sat Aug 31, 2024 1728    Comment   Nanette Fierro discharge to home/self care.                   Follow-up Information       Follow up With Specialties Details Why Contact Info    Kaiden Dunne MD Family Medicine In 2 days  [address] [address]  [phone]              Discharge Medication List as of 8/31/2024  5:29 PM        START taking these medications    Details   predniSONE 20 mg tablet Take 2 tablets (40 mg total) by mouth daily for 5 days, Starting Sat 8/31/2024, Until u 9/5/2024, Normal           CONTINUE these medications which have NOT CHANGED    Details   DULoxetine (CYMBALTA) 60 mg delayed release capsule Take 1 capsule (60 mg total) by mouth daily, Starting Wed 4/12/2023, Normal      estradiol (ESTRACE) 1 mg tablet Take 1 tablet (1 mg total) by mouth 2 (two) times a day, Starting Tue 8/27/2024, Normal      Ferrous Sulfate (IRON PO) Take 64 mg by mouth every other day, Historical Med      fezolinetant (Veozah) tablet Take 1 tablet (45 mg total) by mouth daily, Starting Fri 8/30/2024, Normal      Flowflex COVID-19 Ag Home Test KIT TESTING, Historical Med      HYDROcodone-acetaminophen (NORCO)  mg per tablet Take 1 tablet by mouth every 6 (six) hours as needed, Starting Wed 6/7/2023, Historical Med      LORATADINE PO Take by mouth, Historical Med      methimazole (TAPAZOLE) 5 mg tablet Take 1 tablet (5 mg total) by mouth in the  morning, Starting Wed 12/28/2022, Normal      !! Multiple Vitamin (MULTI-VITAMIN DAILY PO) Take by mouth, Historical Med      !! multivitamin (THERAGRAN) TABS Take 1 tablet by mouth daily, Historical Med      neomycin-polymyxin-hydrocortisone (CORTISPORIN) 0.35%-10,000 units/mL-1% otic suspension Administer 4 drops into the left ear 4 (four) times a day for 7 days, Starting Mon 11/28/2022, Until Tue 4/4/2023, Normal      other medication, see sig, Medication/product name: Scream Cream  Strength: Arginine 6%/ergoloid mesylate 0.05%/ papaverine 5%/sildenafil 1%/ml  Sig (include dose, route, frequency): apply 1 ml to clitoris daily as needed, Phone In      oxyCODONE (ROXICODONE) 5 immediate release tablet Take 5 mg by mouth every 8 (eight) hours as needed, Starting Mon 6/17/2024, Historical Med      Progesterone 200 MG CAPS TAKE 200 MG BY MOUTH AT BEDTIME, Starting Wed 7/24/2024, Normal      VITAMIN D PO Take by mouth, Historical Med      zolpidem (AMBIEN) 10 mg tablet TAKE 1 TABLET BY MOUTH DAILY AT BEDTIME AS NEEDED FOR SLEEP, Normal       !! - Potential duplicate medications found. Please discuss with provider.          No discharge procedures on file.    PDMP Review         Value Time User    PDMP Reviewed  Yes 1/27/2023  3:24 PM Esequiel Peña MD            ED Provider  Electronically Signed by             Cynthia Menendez DO  08/31/24 2030

## 2024-08-31 NOTE — ED NOTES
Pt. Reports she took an at home covid test today which was negative.     Mimi Claire  08/31/24 [id number]

## 2024-09-03 LAB
ATRIAL RATE: 91 BPM
P AXIS: 65 DEGREES
PR INTERVAL: 146 MS
QRS AXIS: 56 DEGREES
QRSD INTERVAL: 88 MS
QT INTERVAL: 362 MS
QTC INTERVAL: 445 MS
T WAVE AXIS: 63 DEGREES
VENTRICULAR RATE: 91 BPM

## 2024-09-05 NOTE — PROGRESS NOTES
Assessment        Diagnoses and all orders for this visit:    Encntr for gyn exam (general) (routine) w/o abn findings    Encounter for screening mammogram for breast cancer  -     Mammo screening bilateral w 3d and cad; Future    S/P endometrial ablation    Screening for colorectal cancer  -     Cologuard    Menopausal symptoms    Other orders  -     Docosahexaenoic Acid (DHA PO); Take by mouth             Plan      All questions answered.  Contraception: vasectomy.  Discussed healthy lifestyle modifications.  Educational material distributed.  Follow up in 1 year.  Follow up as needed.  Mammogram.  Cologuard - declines colonoscopy   PAP not indicated  Will message Dr. Anna  re: her menopausal symptoms -I also encouraged patient to reach out to her    Subjective      Nanette Fierro is a 44 y.o. female who presents for annual exam.      Chief Complaint   Patient presents with    Gynecologic Exam       No vaginal bleeding    She reports fatigue and insomnia    On Day 4 Veozah  She reports mood changes and continued hot flashes    She has stopped both progesterone and estrogen  She has been started on counseling due to mood changes.    Managed by Dr. Anna    Last Pap: 23  Last mammogram: 24, scheduled [facility]  Colorectal cancer screening: n/a     HPV vaccine completed:no  Current contraception: vasectomy  History of abnormal Pap smear: no  History of abnormal mammogram: yes - nodule  Family history of uterine or ovarian cancer: no  Family history of breast cancer: yes - mother at age 60  Family history of colon cancer: no       OB History    Para Term  AB Living   2 2 2 0 0 2   SAB IAB Ectopic Multiple Live Births   0 0 0 0 2      # Outcome Date GA Lbr Chao/2nd Weight Sex Type Anes PTL Lv   2 Term      Vag-Spont      1 Term      Vag-Spont         Obstetric Comments   Menarche: 10       Menstrual History:  OB History          2    Para   2    Term   2       0     AB   0    Living   2         SAB   0    IAB   0    Ectopic   0    Multiple   0    Live Births   2           Obstetric Comments   Menarche: 10             Patient's last menstrual period was 06/01/2024 (exact date).             Past Medical History:   Diagnosis Date    Anemia     Disease of thyroid gland     hyperthyroid    Foot pain, left 2013    She fractured a bone in her left foot.  Had severe pain associated with redness.  Was given the diagnosis of reflex sympathetic dystrophy.  Has had nerve blocks which were ineffective.      Galactorrhea in female 11/10/2015    Genital warts     Graves' disease 7/3/2018    TSI positive.  Intolerant of TPU.  Switched to methimazole 5 mg daily by endocrinologist.  TSH in June of 2018 0.005 with normal T3 & T4. Followed by endocrinology at [facility] Dr. Tapia.    Hepatitis B 1998    Diagnosed with acute Hepatitis B.  Subsequent Hep B Surface Ag negative, Surface Ab positive. HCV negative, HIV negative 2010.     History of breast lump     Hypertrophic scar 07/12/2012    Insomnia     Iron deficiency anemia due to chronic blood loss 7/3/2018     Hemoglobin 11.4 in June of 2018 with a ferritin of 10.  Presumed secondary to menstrual losses.    Kidney stone     Has passed several kidney stones.  No prior kidney stone workup.  Try to stay well hydrated.  No kidney stones for several years.    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    Wears glasses      Past Surgical History:   Procedure Laterality Date    APPENDECTOMY  2001    BREAST BIOPSY Left     benign- ultrasound biopsy- 2019    BREAST IMPLANT Bilateral 2008    CHOLECYSTECTOMY  2004    ENDOMETRIAL ABLATION N/A 05/18/2021    Procedure: ABLATION ENDOMETRIAL NOVASURE;  Surgeon: Valeria Victor MD;  Location: [facility];  Service: Gynecology    FOOT SURGERY  2013    MI HYSTEROSCOPY BX ENDOMETRIUM&/POLYPC W/WO D&C N/A 05/18/2021    Procedure: D&C W/ HYSTEROSCOPY;  Surgeon: Valeria  MD Kayleigh;  Location: [facility];  Service: Gynecology    REVISION OF SCAR  02/18/2012    appendectomy scar revised    US GUIDED BREAST BIOPSY LEFT COMPLETE Left 04/2019     Family History   Problem Relation Age of Onset    Breast cancer Mother 59    Thyroid disease Mother     Iron deficiency Mother     Heart disease Mother     Leukemia Mother 65    Carpal tunnel syndrome Father     Thyroid disease Sister     No Known Problems Daughter     No Known Problems Daughter     No Known Problems Daughter     Thyroid disease Maternal Grandmother     Diabetes Maternal Grandmother     Heart disease Maternal Grandfather     Prostate cancer Maternal Grandfather     Pancreatic cancer Maternal Grandfather     Cervical cancer Paternal Grandmother     Heart disease Paternal Grandmother     Endometrial cancer Paternal Grandmother     Heart disease Paternal Grandfather     Hypertension Paternal Grandfather     Lung cancer Paternal Grandfather     Liver cancer Paternal Grandfather     Heart disease Maternal Aunt     Thyroid disease Maternal Aunt     Thyroid disease Maternal Aunt     No Known Problems Paternal Aunt     No Known Problems Paternal Aunt        Social History     Tobacco Use    Smoking status: Never    Smokeless tobacco: Never   Vaping Use    Vaping status: Never Used   Substance Use Topics    Alcohol use: Not Currently     Comment: rare, few times a year    Drug use: Not Currently     Comment: last used heroin/cocaine age 16- IV usage,describes self as recovering addict          Current Outpatient Medications:     Docosahexaenoic Acid (DHA PO), Take by mouth, Disp: , Rfl:     Ferrous Sulfate (IRON PO), Take 64 mg by mouth every other day, Disp: , Rfl:     fezolinetant (Veozah) tablet, Take 1 tablet (45 mg total) by mouth daily, Disp: 30 tablet, Rfl: 11    HYDROcodone-acetaminophen (NORCO)  mg per tablet, Take 1 tablet by mouth every 6 (six) hours as needed, Disp: , Rfl:     LORATADINE PO, Take by mouth, Disp: , Rfl:      methimazole (TAPAZOLE) 5 mg tablet, Take 1 tablet (5 mg total) by mouth in the morning, Disp: 90 tablet, Rfl: 3    Multiple Vitamin (MULTI-VITAMIN DAILY PO), Take by mouth, Disp: , Rfl:     multivitamin (THERAGRAN) TABS, Take 1 tablet by mouth daily, Disp: , Rfl:     neomycin-polymyxin-hydrocortisone (CORTISPORIN) 0.35%-10,000 units/mL-1% otic suspension, Administer 4 drops into the left ear 4 (four) times a day for 7 days, Disp: 10 mL, Rfl: 0    predniSONE 20 mg tablet, Take 2 tablets (40 mg total) by mouth daily for 5 days, Disp: 10 tablet, Rfl: 0    VITAMIN D PO, Take by mouth, Disp: , Rfl:     zolpidem (AMBIEN) 10 mg tablet, TAKE 1 TABLET BY MOUTH DAILY AT BEDTIME AS NEEDED FOR SLEEP, Disp: 30 tablet, Rfl: 0    DULoxetine (CYMBALTA) 60 mg delayed release capsule, Take 1 capsule (60 mg total) by mouth daily (Patient not taking: Reported on 7/21/2023), Disp: 90 capsule, Rfl: 0    estradiol (ESTRACE) 1 mg tablet, Take 1 tablet (1 mg total) by mouth 2 (two) times a day (Patient not taking: Reported on 9/6/2024), Disp: 180 tablet, Rfl: 0    Flowflex COVID-19 Ag Home Test KIT, TESTING (Patient not taking: Reported on 9/6/2024), Disp: , Rfl:     other medication, see sig,, Medication/product name: Scream Cream Strength: Arginine 6%/ergoloid mesylate 0.05%/ papaverine 5%/sildenafil 1%/ml Sig (include dose, route, frequency): apply 1 ml to clitoris daily as needed, Disp: 15 g, Rfl: 11    oxyCODONE (ROXICODONE) 5 immediate release tablet, Take 5 mg by mouth every 8 (eight) hours as needed (Patient not taking: Reported on 9/6/2024), Disp: , Rfl:     Progesterone 200 MG CAPS, TAKE 200 MG BY MOUTH AT BEDTIME (Patient not taking: Reported on 9/6/2024), Disp: 90 capsule, Rfl: 4    Allergies   Allergen Reactions    Sulfa Antibiotics Other (See Comments)     Worsens UTI symptoms    Aspirin Rash and GI Intolerance    Penicillins Rash and GI Intolerance           Review of Systems   Constitutional: Negative.    HENT: Negative.  "    Eyes: Negative.    Respiratory: Negative.     Cardiovascular: Negative.    Gastrointestinal: Negative.    Endocrine: Negative.    Genitourinary:         As noted in HPI   Musculoskeletal: Negative.    Skin: Negative.    Allergic/Immunologic: Negative.    Neurological: Negative.    Hematological: Negative.    Psychiatric/Behavioral: Negative.         /90   Pulse 83   Ht 5' 11\" (1.803 m)   Wt 78.8 kg (173 lb 12.8 oz)   LMP 06/01/2024 (Exact Date) Comment: Lasted 2 days  BMI 24.24 kg/m²         Physical Exam  Constitutional:       Appearance: She is well-developed.   Genitourinary:      Vulva, bladder and rectum normal.      No lesions in the vagina.      Right Labia: No rash, tenderness, lesions, skin changes or Bartholin's cyst.     Left Labia: No tenderness, lesions, skin changes, Bartholin's cyst or rash.     No inguinal adenopathy present in the right or left side.     No vaginal discharge, tenderness or bleeding.      No vaginal prolapse present.     No vaginal atrophy present.       Right Adnexa: not tender, not full and no mass present.     Left Adnexa: not tender, not full and no mass present.     No cervical motion tenderness, friability, lesion or polyp.      Uterus is not enlarged or tender.      Pelvic exam was performed with patient in the lithotomy position.   Rectum:      No external hemorrhoid.   Breasts:     Right: Breast implant present. No mass, nipple discharge, skin change or tenderness.      Left: Breast implant present. No mass, nipple discharge, skin change or tenderness.   HENT:      Head: Normocephalic.      Nose: Nose normal.   Eyes:      Conjunctiva/sclera: Conjunctivae normal.   Neck:      Thyroid: No thyromegaly.   Cardiovascular:      Rate and Rhythm: Normal rate.   Pulmonary:      Effort: Pulmonary effort is normal.   Abdominal:      General: There is no distension.      Palpations: Abdomen is soft. There is no mass.      Tenderness: There is no abdominal tenderness. " There is no guarding or rebound.   Musculoskeletal:         General: No tenderness.      Cervical back: Neck supple. No muscular tenderness.   Lymphadenopathy:      Cervical: No cervical adenopathy.      Lower Body: No right inguinal adenopathy. No left inguinal adenopathy.   Neurological:      Mental Status: She is alert and oriented to person, place, and time.   Skin:     General: Skin is warm and moist.   Psychiatric:         Mood and Affect: Mood normal.         Behavior: Behavior normal.   Vitals and nursing note reviewed. Exam conducted with a chaperone present.             Future Appointments   Date Time Provider Department Center   10/15/2024  4:00 PM Wilda Campbell MD OBGYN  [facility]   9/5/2025  8:00 AM [facility]

## 2024-09-06 ENCOUNTER — ANNUAL EXAM (OUTPATIENT)
Age: 44
End: 2024-09-06
Payer: COMMERCIAL

## 2024-09-06 VITALS
WEIGHT: 173.8 LBS | DIASTOLIC BLOOD PRESSURE: 90 MMHG | SYSTOLIC BLOOD PRESSURE: 124 MMHG | BODY MASS INDEX: 24.33 KG/M2 | HEIGHT: 71 IN | HEART RATE: 83 BPM

## 2024-09-06 DIAGNOSIS — Z12.11 SCREENING FOR COLORECTAL CANCER: ICD-10-CM

## 2024-09-06 DIAGNOSIS — Z98.890 S/P ENDOMETRIAL ABLATION: ICD-10-CM

## 2024-09-06 DIAGNOSIS — N95.1 MENOPAUSAL SYMPTOMS: ICD-10-CM

## 2024-09-06 DIAGNOSIS — Z12.12 SCREENING FOR COLORECTAL CANCER: ICD-10-CM

## 2024-09-06 DIAGNOSIS — Z12.31 ENCOUNTER FOR SCREENING MAMMOGRAM FOR BREAST CANCER: ICD-10-CM

## 2024-09-06 DIAGNOSIS — Z01.419 ENCNTR FOR GYN EXAM (GENERAL) (ROUTINE) W/O ABN FINDINGS: Primary | ICD-10-CM

## 2024-09-06 PROCEDURE — S0612 ANNUAL GYNECOLOGICAL EXAMINA: HCPCS | Performed by: OBSTETRICS & GYNECOLOGY

## 2024-10-01 LAB — COLOGUARD RESULT REPORTABLE: NEGATIVE

## 2024-10-15 ENCOUNTER — TELEMEDICINE (OUTPATIENT)
Age: 44
End: 2024-10-15
Payer: COMMERCIAL

## 2024-10-15 DIAGNOSIS — N95.1 MENOPAUSAL SYMPTOMS: ICD-10-CM

## 2024-10-15 DIAGNOSIS — L65.8 FEMALE PATTERN HAIR LOSS: Primary | ICD-10-CM

## 2024-10-15 DIAGNOSIS — F52.31 ANORGASMIA OF FEMALE: ICD-10-CM

## 2024-10-15 PROBLEM — N64.3 GALACTORRHEA: Status: RESOLVED | Noted: 2018-07-03 | Resolved: 2024-10-15

## 2024-10-15 PROCEDURE — 99215 OFFICE O/P EST HI 40 MIN: CPT | Performed by: OBSTETRICS & GYNECOLOGY

## 2024-10-15 RX ORDER — SPIRONOLACTONE 50 MG/1
50 TABLET, FILM COATED ORAL DAILY
Qty: 30 TABLET | Refills: 11 | Status: SHIPPED | OUTPATIENT
Start: 2024-10-15

## 2024-10-20 NOTE — PROGRESS NOTES
Virtual Regular Visit  Name: Nanette Fierro      : 1980      MRN: [id number]  Encounter Provider: Wilda Campbell MD  Encounter Date: 10/15/2024   Encounter department: [facility] [place]    Verification of patient location:    Patient is located at Home in the following state in which I hold an active license [place]    Assessment & Plan  Female pattern hair loss    Orders:    spironolactone (ALDACTONE) 50 mg tablet; Take 1 tablet (50 mg total) by mouth daily      Anorgasmia of female    Orders:    other medication, see sig,; Medication/product name: testosterone cream  Strength: 10 mg/ml   Sig (include dose, route, frequency): apply 0.5 ml to labia daily      Menopausal symptoms         1) Overall I am pleased with her progress. She wanted to discuss testosterone a bit more.  2) Testosterone supplementation discussed in detail, including lack of FDA approval for women and cost concerns, as insurance will not reimburse. Side effects include: increased libido, increased muscle mass, decreased fat mass, erythrocytosis ( NOT polycythemia rubra), increased energy, acne, increased hair growth or loss.  3)If she did this, I would concurrently treat hair with spironolactone, which prevents conversion to DHT at hair follicle, not lowering testosterone level itself. She liked this. Testosterone 5 mg/d in cream with oral xiomara 50 mg daily. I reminded her that with regard to libido, this may take 2-3 months to achieve. Stop DHEA if on testosterone, don't need both.  4) Email with progress report in one month, titrating cream dose up as tolerated. NO adjustments made in her other meds.  5) Follow up prn or one year      This was a 45 minute visit with greater than 50% of time spent in face to face counseling and coordination of care    Encounter provider Wilda Campbell MD    The patient was identified by name and date of birth. Nanette Fierro was informed that this is a  telemedicine visit and that the visit is being conducted through the Epic Embedded platform. She agrees to proceed..  My office door was closed. No one else was in the room.  She acknowledged consent and understanding of privacy and security of the video platform. The patient has agreed to participate and understands they can discontinue the visit at any time.    Patient is aware this is a billable service.     History of Present Illness     Nanette returns for hormone consult follow up. I saw her 5 months ago with complaints of perimenopausal symptoms, hair loss, weight gain, decreased libido and anorgasmia. Gyn started her on Combipatch, ineffective.   I offered oral PG alone with topical scream cream for anorgasmia prn. Oral PG later changed to cream.  IN 7/24, hot flashes became debilitating, now postmenopausal. Regimen changed to oral estradiol 1 mg, progesterone increased to 200 mg orally. Also recommended DHEA 10 mg instead of testosterone, Scream Cream ineffective.   When hot flashes worsened, she tried Veozah instead, ineffective.   Now on oral estradiol 2 mg/ progesterone 200 mg daily. Since then:  1) Hot flashes and sleep drastically improved!  2) Sexual function improved! But libido could be better  3) Still concerned about hair loss, brain fog. Always hungry, sugar cravings.   NO other changes in health status or family history.     Review of Systems   Constitutional:  Positive for appetite change and fatigue.   Gastrointestinal: Negative.    Endocrine: Negative.    Genitourinary:         Decreased libido   Musculoskeletal: Negative.    Skin:         Hair thinning ( loss)   Neurological: Negative.    Psychiatric/Behavioral:  Positive for decreased concentration.          Objective

## 2024-11-06 DIAGNOSIS — L65.8 FEMALE PATTERN HAIR LOSS: ICD-10-CM

## 2024-11-06 RX ORDER — SPIRONOLACTONE 50 MG/1
50 TABLET, FILM COATED ORAL DAILY
Qty: 90 TABLET | Refills: 4 | Status: SHIPPED | OUTPATIENT
Start: 2024-11-06

## 2024-11-14 ENCOUNTER — PATIENT MESSAGE (OUTPATIENT)
Age: 44
End: 2024-11-14

## 2024-11-15 ENCOUNTER — NURSE TRIAGE (OUTPATIENT)
Age: 44
End: 2024-11-15

## 2024-11-15 DIAGNOSIS — N89.8 VAGINAL ITCHING: Primary | ICD-10-CM

## 2024-11-15 RX ORDER — FLUCONAZOLE 150 MG/1
150 TABLET ORAL DAILY
Qty: 1 TABLET | Refills: 0 | Status: SHIPPED | OUTPATIENT
Start: 2024-11-15 | End: 2024-11-16

## 2024-11-15 NOTE — TELEPHONE ENCOUNTER
"Call to pt. States she has been having yeast symptoms for the last week- clumpy white discharge, itching, burning. She did try an off brand monistat 3 last week, no relief. Also used zinc last night, helped a little bit. Pt requesting diflucan script be sent to [facility] pharmacy in [place]. Pt aware to call back if symptoms worsen/do not improve. Pt thankful. She is also wondering if there is any blood work needed from a GYN standpoint at this time. States she is going to the lab for other blood work soon and has to pay out of pocket for blood draws. Would like to have all needed labs drawn at one time. Advised nothing ordered at this time but will review. Pt thankful.     \"How many yeast infections have you had in the past 2 years?\"    -If 1 or less, prescribe Diflucan 150mg PO. If no improvement after 1 dose treatment, please instruct patient to call back to schedule appointment. (should be seen within 3 days)    -If more than 4 or more yeast infections in a year or if they are recurrent, schedule appointment within 3 days.    For OB patients: if patient wishes to use over the counter monistat, recommend only the 7 day treatment.     Reason for Disposition   Symptoms of a vaginal yeast infection (i.e., white, thick, cottage-cheese-like, itchy, not bad smelling discharge)    Answer Assessment - Initial Assessment Questions  1. DISCHARGE: \"Describe the discharge.\" (e.g., white, yellow, green, gray, foamy, cottage cheese-like)      Thick white   2. ODOR: \"Is there a bad odor?\"      denies  3. ONSET: \"When did the discharge begin?\"      One week ago  4. RASH: \"Is there a rash in the genital area?\" If Yes, ask: \"Describe it.\" (e.g., redness, blisters, sores, bumps)      denies  5. ABDOMEN PAIN: \"Are you having any abdomen pain?\" If Yes, ask: \"What does it feel like? \" (e.g., crampy, dull, intermittent, constant)       denies  6. ABDOMEN PAIN SEVERITY: If present, ask: \"How bad is it?\" (e.g., Scale 1-10; mild, " "moderate, or severe)      denies  7. CAUSE: \"What do you think is causing the discharge?\" \"Have you had the same problem before?\" \"What happened then?\"      Yeast infection  8. OTHER SYMPTOMS: \"Do you have any other symptoms?\" (e.g., fever, itching, vaginal bleeding, pain with urination, injury to genital area, vaginal foreign body)      denies  9. PREGNANCY: \"Is there any chance you are pregnant?\" \"When was your last menstrual period?\"      premenopausal    Protocols used: Vaginal Discharge-Adult-[place]    "

## 2024-11-15 NOTE — TELEPHONE ENCOUNTER
"RN, please see the thread in patient MY[facility] msg encounter to determine if script is appropriate for Italian    \"Typical symptoms from a yeast infection. Itching, burning, discharge. I haven’t had one in a while but they are definitely the same. I tried the OTC treatment but it didn’t cut it. In the past, the fluconazole made the difference. Hoping for the same to resolve it. It’s been about a week now \"    Thank you   "

## 2024-12-06 DIAGNOSIS — N95.1 MENOPAUSAL SYMPTOMS: ICD-10-CM

## 2024-12-06 RX ORDER — ESTRADIOL 1 MG/1
1 TABLET ORAL 2 TIMES DAILY
Qty: 180 TABLET | Refills: 1 | Status: SHIPPED | OUTPATIENT
Start: 2024-12-06

## 2024-12-13 ENCOUNTER — APPOINTMENT (OUTPATIENT)
Dept: LAB | Facility: CLINIC | Age: 44
End: 2024-12-13
Payer: COMMERCIAL

## 2024-12-13 DIAGNOSIS — R35.0 URINARY FREQUENCY: ICD-10-CM

## 2024-12-13 DIAGNOSIS — E05.00 GRAVES' DISEASE: ICD-10-CM

## 2024-12-13 DIAGNOSIS — L65.0 TELOGEN EFFLUVIUM: ICD-10-CM

## 2024-12-13 DIAGNOSIS — R10.9 STOMACH ACHE: ICD-10-CM

## 2024-12-13 DIAGNOSIS — E78.5 HYPERLIPIDEMIA, UNSPECIFIED HYPERLIPIDEMIA TYPE: ICD-10-CM

## 2024-12-13 LAB
ALBUMIN SERPL BCG-MCNC: 3.9 G/DL (ref 3.5–5)
ALP SERPL-CCNC: 52 U/L (ref 34–104)
ALT SERPL W P-5'-P-CCNC: 10 U/L (ref 7–52)
ANION GAP SERPL CALCULATED.3IONS-SCNC: 9 MMOL/L (ref 4–13)
AST SERPL W P-5'-P-CCNC: 16 U/L (ref 13–39)
BASOPHILS # BLD AUTO: 0.02 THOUSANDS/ÂΜL (ref 0–0.1)
BASOPHILS NFR BLD AUTO: 1 % (ref 0–1)
BILIRUB SERPL-MCNC: 0.61 MG/DL (ref 0.2–1)
BUN SERPL-MCNC: 19 MG/DL (ref 5–25)
CALCIUM SERPL-MCNC: 9 MG/DL (ref 8.4–10.2)
CHLORIDE SERPL-SCNC: 106 MMOL/L (ref 96–108)
CHOLEST SERPL-MCNC: 95 MG/DL (ref ?–200)
CO2 SERPL-SCNC: 24 MMOL/L (ref 21–32)
CREAT SERPL-MCNC: 0.55 MG/DL (ref 0.6–1.3)
EOSINOPHIL # BLD AUTO: 0.11 THOUSAND/ÂΜL (ref 0–0.61)
EOSINOPHIL NFR BLD AUTO: 3 % (ref 0–6)
ERYTHROCYTE [DISTWIDTH] IN BLOOD BY AUTOMATED COUNT: 12 % (ref 11.6–15.1)
FERRITIN SERPL-MCNC: 59 NG/ML (ref 11–307)
FSH SERPL-ACNC: 34.8 MIU/ML
GFR SERPL CREATININE-BSD FRML MDRD: 113 ML/MIN/1.73SQ M
GLUCOSE P FAST SERPL-MCNC: 84 MG/DL (ref 65–99)
HCT VFR BLD AUTO: 38.2 % (ref 34.8–46.1)
HDLC SERPL-MCNC: 44 MG/DL
HGB BLD-MCNC: 12.5 G/DL (ref 11.5–15.4)
IMM GRANULOCYTES # BLD AUTO: 0.01 THOUSAND/UL (ref 0–0.2)
IMM GRANULOCYTES NFR BLD AUTO: 0 % (ref 0–2)
LDLC SERPL CALC-MCNC: 32 MG/DL (ref 0–100)
LYMPHOCYTES # BLD AUTO: 1.33 THOUSANDS/ÂΜL (ref 0.6–4.47)
LYMPHOCYTES NFR BLD AUTO: 38 % (ref 14–44)
MCH RBC QN AUTO: 26.8 PG (ref 26.8–34.3)
MCHC RBC AUTO-ENTMCNC: 32.7 G/DL (ref 31.4–37.4)
MCV RBC AUTO: 82 FL (ref 82–98)
MONOCYTES # BLD AUTO: 0.48 THOUSAND/ÂΜL (ref 0.17–1.22)
MONOCYTES NFR BLD AUTO: 14 % (ref 4–12)
NEUTROPHILS # BLD AUTO: 1.55 THOUSANDS/ÂΜL (ref 1.85–7.62)
NEUTS SEG NFR BLD AUTO: 44 % (ref 43–75)
NONHDLC SERPL-MCNC: 51 MG/DL
NRBC BLD AUTO-RTO: 0 /100 WBCS
PLATELET # BLD AUTO: 205 THOUSANDS/UL (ref 149–390)
PMV BLD AUTO: 9.8 FL (ref 8.9–12.7)
POTASSIUM SERPL-SCNC: 3.4 MMOL/L (ref 3.5–5.3)
PROT SERPL-MCNC: 6.3 G/DL (ref 6.4–8.4)
RBC # BLD AUTO: 4.66 MILLION/UL (ref 3.81–5.12)
SODIUM SERPL-SCNC: 139 MMOL/L (ref 135–147)
T3FREE SERPL-MCNC: 11.25 PG/ML (ref 2.5–3.9)
T4 FREE SERPL-MCNC: 4.2 NG/DL (ref 0.61–1.12)
TRIGL SERPL-MCNC: 96 MG/DL (ref ?–150)
TSH SERPL DL<=0.05 MIU/L-ACNC: <0.01 UIU/ML (ref 0.45–4.5)
WBC # BLD AUTO: 3.5 THOUSAND/UL (ref 4.31–10.16)

## 2024-12-13 PROCEDURE — 85025 COMPLETE CBC W/AUTO DIFF WBC: CPT

## 2024-12-13 PROCEDURE — 36415 COLL VENOUS BLD VENIPUNCTURE: CPT

## 2024-12-13 PROCEDURE — 84481 FREE ASSAY (FT-3): CPT

## 2024-12-13 PROCEDURE — 82728 ASSAY OF FERRITIN: CPT

## 2024-12-13 PROCEDURE — 80053 COMPREHEN METABOLIC PANEL: CPT

## 2024-12-13 PROCEDURE — 80061 LIPID PANEL: CPT

## 2024-12-13 PROCEDURE — 84443 ASSAY THYROID STIM HORMONE: CPT

## 2024-12-13 PROCEDURE — 84439 ASSAY OF FREE THYROXINE: CPT

## 2024-12-17 ENCOUNTER — TELEPHONE (OUTPATIENT)
Dept: OBGYN CLINIC | Facility: CLINIC | Age: 44
End: 2024-12-17

## 2024-12-17 DIAGNOSIS — B37.9 YEAST INFECTION: Primary | ICD-10-CM

## 2024-12-17 RX ORDER — FLUCONAZOLE 150 MG/1
150 TABLET ORAL
Qty: 2 TABLET | Refills: 0 | Status: SHIPPED | OUTPATIENT
Start: 2024-12-17 | End: 2024-12-21

## 2025-05-05 DIAGNOSIS — N95.1 MENOPAUSAL SYMPTOMS: ICD-10-CM

## 2025-05-06 RX ORDER — ESTRADIOL 1 MG/1
1 TABLET ORAL 2 TIMES DAILY
Qty: 180 TABLET | Refills: 1 | Status: SHIPPED | OUTPATIENT
Start: 2025-05-06

## 2025-07-23 ENCOUNTER — APPOINTMENT (OUTPATIENT)
Dept: LAB | Facility: CLINIC | Age: 45
End: 2025-07-23
Payer: COMMERCIAL

## 2025-07-23 DIAGNOSIS — E05.00 GRAVES' DISEASE: ICD-10-CM

## 2025-07-23 LAB
ALBUMIN SERPL BCG-MCNC: 4.2 G/DL (ref 3.5–5)
ALP SERPL-CCNC: 72 U/L (ref 34–104)
ALT SERPL W P-5'-P-CCNC: 19 U/L (ref 7–52)
AST SERPL W P-5'-P-CCNC: 26 U/L (ref 13–39)
BASOPHILS # BLD AUTO: 0.03 THOUSANDS/ÂΜL (ref 0–0.1)
BASOPHILS NFR BLD AUTO: 1 % (ref 0–1)
BILIRUB DIRECT SERPL-MCNC: 0.1 MG/DL (ref 0–0.2)
BILIRUB SERPL-MCNC: 0.66 MG/DL (ref 0.2–1)
EOSINOPHIL # BLD AUTO: 0.16 THOUSAND/ÂΜL (ref 0–0.61)
EOSINOPHIL NFR BLD AUTO: 3 % (ref 0–6)
ERYTHROCYTE [DISTWIDTH] IN BLOOD BY AUTOMATED COUNT: 13.6 % (ref 11.6–15.1)
HCT VFR BLD AUTO: 39.9 % (ref 34.8–46.1)
HGB BLD-MCNC: 12.7 G/DL (ref 11.5–15.4)
IMM GRANULOCYTES # BLD AUTO: 0.01 THOUSAND/UL (ref 0–0.2)
IMM GRANULOCYTES NFR BLD AUTO: 0 % (ref 0–2)
LYMPHOCYTES # BLD AUTO: 1.7 THOUSANDS/ÂΜL (ref 0.6–4.47)
LYMPHOCYTES NFR BLD AUTO: 27 % (ref 14–44)
MCH RBC QN AUTO: 26.5 PG (ref 26.8–34.3)
MCHC RBC AUTO-ENTMCNC: 31.8 G/DL (ref 31.4–37.4)
MCV RBC AUTO: 83 FL (ref 82–98)
MONOCYTES # BLD AUTO: 0.62 THOUSAND/ÂΜL (ref 0.17–1.22)
MONOCYTES NFR BLD AUTO: 10 % (ref 4–12)
NEUTROPHILS # BLD AUTO: 3.86 THOUSANDS/ÂΜL (ref 1.85–7.62)
NEUTS SEG NFR BLD AUTO: 59 % (ref 43–75)
NRBC BLD AUTO-RTO: 0 /100 WBCS
PLATELET # BLD AUTO: 219 THOUSANDS/UL (ref 149–390)
PMV BLD AUTO: 10.2 FL (ref 8.9–12.7)
PROT SERPL-MCNC: 6.8 G/DL (ref 6.4–8.4)
RBC # BLD AUTO: 4.8 MILLION/UL (ref 3.81–5.12)
T3FREE SERPL-MCNC: 3.5 PG/ML (ref 2.5–3.9)
T4 FREE SERPL-MCNC: 0.82 NG/DL (ref 0.61–1.12)
TSH SERPL DL<=0.05 MIU/L-ACNC: <0.01 UIU/ML (ref 0.45–4.5)
WBC # BLD AUTO: 6.38 THOUSAND/UL (ref 4.31–10.16)

## 2025-07-23 PROCEDURE — 80076 HEPATIC FUNCTION PANEL: CPT

## 2025-07-23 PROCEDURE — 84439 ASSAY OF FREE THYROXINE: CPT

## 2025-07-23 PROCEDURE — 84481 FREE ASSAY (FT-3): CPT

## 2025-07-23 PROCEDURE — 85025 COMPLETE CBC W/AUTO DIFF WBC: CPT

## 2025-07-23 PROCEDURE — 84443 ASSAY THYROID STIM HORMONE: CPT

## 2025-07-23 PROCEDURE — 36415 COLL VENOUS BLD VENIPUNCTURE: CPT

## 2025-08-01 DIAGNOSIS — F52.31 ANORGASMIA OF FEMALE: ICD-10-CM

## 2025-08-08 ENCOUNTER — APPOINTMENT (OUTPATIENT)
Dept: LAB | Facility: CLINIC | Age: 45
End: 2025-08-08
Payer: COMMERCIAL

## (undated) DEVICE — UNDER BUTTOCKS DRAPE W/FLUID CONTROL POUCH: Brand: CONVERTORS

## (undated) DEVICE — PVC URETHRAL CATHETER: Brand: DOVER

## (undated) DEVICE — SPONGE 4 X 4 XRAY 16 PLY STRL LF RFD

## (undated) DEVICE — IV EXTENSION TUBING 33 IN

## (undated) DEVICE — DRAPE EQUIPMENT RF WAND

## (undated) DEVICE — CYSTO TUBING SINGLE IRRIGATION

## (undated) DEVICE — SCD SEQUENTIAL COMPRESSION COMFORT SLEEVE MEDIUM KNEE LENGTH: Brand: KENDALL SCD

## (undated) DEVICE — GLOVE PI ULTRA TOUCH SZ.6.5

## (undated) DEVICE — LIGHT GLOVE GREEN

## (undated) DEVICE — 2000CC GUARDIAN II: Brand: GUARDIAN

## (undated) DEVICE — STERILE 8 INCH PROCTO SWAB: Brand: CARDINAL HEALTH

## (undated) DEVICE — STRL ALLENTOWN HYSTEROSCOPY PK: Brand: CARDINAL HEALTH

## (undated) DEVICE — TELFA NON-ADHERENT ABSORBENT DRESSING: Brand: TELFA

## (undated) DEVICE — NOVASURE ADVANCED DEVICE

## (undated) DEVICE — PREMIUM DRY TRAY LF: Brand: MEDLINE INDUSTRIES, INC.

## (undated) DEVICE — 3000CC GUARDIAN II: Brand: GUARDIAN

## (undated) DEVICE — GLOVE INDICATOR PI UNDERGLOVE SZ 6.5 BLUE

## (undated) DEVICE — PACK FLUENT DISP